# Patient Record
Sex: MALE | Race: WHITE | Employment: FULL TIME | URBAN - METROPOLITAN AREA
[De-identification: names, ages, dates, MRNs, and addresses within clinical notes are randomized per-mention and may not be internally consistent; named-entity substitution may affect disease eponyms.]

---

## 2017-01-20 ENCOUNTER — APPOINTMENT (OUTPATIENT)
Dept: LAB | Facility: CLINIC | Age: 62
End: 2017-01-20
Payer: COMMERCIAL

## 2017-01-20 ENCOUNTER — TRANSCRIBE ORDERS (OUTPATIENT)
Dept: LAB | Facility: CLINIC | Age: 62
End: 2017-01-20

## 2017-01-20 DIAGNOSIS — Z79.4 TYPE 2 DIABETES MELLITUS WITH COMPLICATION, WITH LONG-TERM CURRENT USE OF INSULIN (HCC): ICD-10-CM

## 2017-01-20 DIAGNOSIS — E11.8 TYPE 2 DIABETES MELLITUS WITH COMPLICATION, WITH LONG-TERM CURRENT USE OF INSULIN (HCC): ICD-10-CM

## 2017-01-20 DIAGNOSIS — Z79.4 TYPE 2 DIABETES MELLITUS WITH COMPLICATION, WITH LONG-TERM CURRENT USE OF INSULIN (HCC): Primary | ICD-10-CM

## 2017-01-20 DIAGNOSIS — E11.8 TYPE 2 DIABETES MELLITUS WITH COMPLICATION, WITH LONG-TERM CURRENT USE OF INSULIN (HCC): Primary | ICD-10-CM

## 2017-01-20 LAB
EST. AVERAGE GLUCOSE BLD GHB EST-MCNC: 275 MG/DL
HBA1C MFR BLD: 11.2 % (ref 4.2–6.3)

## 2017-01-20 PROCEDURE — 83036 HEMOGLOBIN GLYCOSYLATED A1C: CPT

## 2017-01-20 PROCEDURE — 36415 COLL VENOUS BLD VENIPUNCTURE: CPT

## 2018-01-12 NOTE — MISCELLANEOUS
Message  Return to work or school:   Wander Gutierrez is under my professional care   He was seen in my office on 6/29/16   He is able to return to work on  6/30/2016            Signatures   Electronically signed by : SAGAR Boo ; Jul 1 2016  8:10AM EST                       (Author)
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
## 2019-04-01 PROBLEM — E55.9 VITAMIN D DEFICIENCY: Status: ACTIVE | Noted: 2019-04-01

## 2019-04-02 ENCOUNTER — OFFICE VISIT (OUTPATIENT)
Dept: INTERNAL MEDICINE CLINIC | Facility: CLINIC | Age: 64
End: 2019-04-02
Payer: COMMERCIAL

## 2019-04-02 VITALS
HEIGHT: 68 IN | TEMPERATURE: 99.1 F | OXYGEN SATURATION: 98 % | SYSTOLIC BLOOD PRESSURE: 130 MMHG | HEART RATE: 94 BPM | DIASTOLIC BLOOD PRESSURE: 84 MMHG | BODY MASS INDEX: 28.64 KG/M2 | WEIGHT: 189 LBS

## 2019-04-02 DIAGNOSIS — Z79.4 TYPE 2 DIABETES MELLITUS WITH HYPERGLYCEMIA, WITH LONG-TERM CURRENT USE OF INSULIN (HCC): Primary | ICD-10-CM

## 2019-04-02 DIAGNOSIS — Z72.0 TOBACCO ABUSE: ICD-10-CM

## 2019-04-02 DIAGNOSIS — E55.9 VITAMIN D DEFICIENCY: ICD-10-CM

## 2019-04-02 DIAGNOSIS — Z12.11 COLON CANCER SCREENING: ICD-10-CM

## 2019-04-02 DIAGNOSIS — M62.81 MUSCLE WEAKNESS: ICD-10-CM

## 2019-04-02 DIAGNOSIS — N52.9 ERECTILE DYSFUNCTION, UNSPECIFIED ERECTILE DYSFUNCTION TYPE: ICD-10-CM

## 2019-04-02 DIAGNOSIS — K08.9 TEETH PROBLEM: ICD-10-CM

## 2019-04-02 DIAGNOSIS — E11.65 TYPE 2 DIABETES MELLITUS WITH HYPERGLYCEMIA, WITH LONG-TERM CURRENT USE OF INSULIN (HCC): Primary | ICD-10-CM

## 2019-04-02 DIAGNOSIS — E66.3 OVERWEIGHT (BMI 25.0-29.9): ICD-10-CM

## 2019-04-02 DIAGNOSIS — Z12.5 PROSTATE CANCER SCREENING: ICD-10-CM

## 2019-04-02 DIAGNOSIS — E78.2 MIXED HYPERLIPIDEMIA: ICD-10-CM

## 2019-04-02 PROBLEM — M15.9 PRIMARY OSTEOARTHRITIS INVOLVING MULTIPLE JOINTS: Status: ACTIVE | Noted: 2019-04-02

## 2019-04-02 PROBLEM — M15.0 PRIMARY OSTEOARTHRITIS INVOLVING MULTIPLE JOINTS: Status: ACTIVE | Noted: 2019-04-02

## 2019-04-02 PROCEDURE — 99204 OFFICE O/P NEW MOD 45 MIN: CPT | Performed by: FAMILY MEDICINE

## 2019-04-02 PROCEDURE — 90471 IMMUNIZATION ADMIN: CPT | Performed by: FAMILY MEDICINE

## 2019-04-02 PROCEDURE — 90670 PCV13 VACCINE IM: CPT | Performed by: FAMILY MEDICINE

## 2019-04-02 PROCEDURE — 3008F BODY MASS INDEX DOCD: CPT | Performed by: FAMILY MEDICINE

## 2019-04-02 RX ORDER — LISINOPRIL 2.5 MG/1
2.5 TABLET ORAL DAILY
Refills: 5 | COMMUNITY
Start: 2019-01-08 | End: 2019-04-22 | Stop reason: SDUPTHER

## 2019-04-02 RX ORDER — INSULIN DEGLUDEC INJECTION 100 U/ML
INJECTION, SOLUTION SUBCUTANEOUS
Refills: 5 | COMMUNITY
Start: 2019-02-05 | End: 2019-08-25 | Stop reason: SDUPTHER

## 2019-04-02 RX ORDER — PIOGLITAZONEHYDROCHLORIDE 15 MG/1
15 TABLET ORAL DAILY
Refills: 5 | COMMUNITY
Start: 2019-03-04 | End: 2019-04-02

## 2019-04-02 RX ORDER — PRAVASTATIN SODIUM 20 MG
20 TABLET ORAL DAILY
COMMUNITY
End: 2019-05-19 | Stop reason: SDUPTHER

## 2019-04-09 ENCOUNTER — TELEPHONE (OUTPATIENT)
Dept: INTERNAL MEDICINE CLINIC | Facility: CLINIC | Age: 64
End: 2019-04-09

## 2019-04-22 DIAGNOSIS — E11.65 TYPE 2 DIABETES MELLITUS WITH HYPERGLYCEMIA, UNSPECIFIED WHETHER LONG TERM INSULIN USE (HCC): Primary | ICD-10-CM

## 2019-04-22 RX ORDER — LISINOPRIL 2.5 MG/1
2.5 TABLET ORAL DAILY
Qty: 30 TABLET | Refills: 5 | Status: SHIPPED | OUTPATIENT
Start: 2019-04-22 | End: 2019-11-23 | Stop reason: SDUPTHER

## 2019-04-25 ENCOUNTER — APPOINTMENT (OUTPATIENT)
Dept: LAB | Facility: CLINIC | Age: 64
End: 2019-04-25
Payer: COMMERCIAL

## 2019-04-25 DIAGNOSIS — N52.9 ERECTILE DYSFUNCTION, UNSPECIFIED ERECTILE DYSFUNCTION TYPE: ICD-10-CM

## 2019-04-25 DIAGNOSIS — K08.9 TEETH PROBLEM: ICD-10-CM

## 2019-04-25 DIAGNOSIS — M62.81 MUSCLE WEAKNESS: ICD-10-CM

## 2019-04-25 DIAGNOSIS — E55.9 VITAMIN D DEFICIENCY: ICD-10-CM

## 2019-04-25 DIAGNOSIS — Z79.4 TYPE 2 DIABETES MELLITUS WITH HYPERGLYCEMIA, WITH LONG-TERM CURRENT USE OF INSULIN (HCC): ICD-10-CM

## 2019-04-25 DIAGNOSIS — Z72.0 TOBACCO ABUSE: ICD-10-CM

## 2019-04-25 DIAGNOSIS — Z12.5 PROSTATE CANCER SCREENING: ICD-10-CM

## 2019-04-25 DIAGNOSIS — E11.65 TYPE 2 DIABETES MELLITUS WITH HYPERGLYCEMIA, WITH LONG-TERM CURRENT USE OF INSULIN (HCC): ICD-10-CM

## 2019-04-25 DIAGNOSIS — E66.3 OVERWEIGHT (BMI 25.0-29.9): ICD-10-CM

## 2019-04-25 DIAGNOSIS — E78.2 MIXED HYPERLIPIDEMIA: ICD-10-CM

## 2019-04-25 LAB
25(OH)D3 SERPL-MCNC: 19 NG/ML (ref 30–100)
ALBUMIN SERPL BCP-MCNC: 3.5 G/DL (ref 3.5–5)
ALP SERPL-CCNC: 89 U/L (ref 46–116)
ALT SERPL W P-5'-P-CCNC: 25 U/L (ref 12–78)
ANION GAP SERPL CALCULATED.3IONS-SCNC: 9 MMOL/L (ref 4–13)
AST SERPL W P-5'-P-CCNC: 25 U/L (ref 5–45)
BASOPHILS # BLD AUTO: 0.1 THOUSANDS/ΜL (ref 0–0.1)
BASOPHILS NFR BLD AUTO: 1 % (ref 0–1)
BILIRUB SERPL-MCNC: 0.81 MG/DL (ref 0.2–1)
BUN SERPL-MCNC: 10 MG/DL (ref 5–25)
CALCIUM SERPL-MCNC: 8.6 MG/DL (ref 8.3–10.1)
CHLORIDE SERPL-SCNC: 102 MMOL/L (ref 100–108)
CHOLEST SERPL-MCNC: 136 MG/DL (ref 50–200)
CO2 SERPL-SCNC: 27 MMOL/L (ref 21–32)
CREAT SERPL-MCNC: 0.69 MG/DL (ref 0.6–1.3)
CREAT UR-MCNC: 80 MG/DL
EOSINOPHIL # BLD AUTO: 0.3 THOUSAND/ΜL (ref 0–0.61)
EOSINOPHIL NFR BLD AUTO: 4 % (ref 0–6)
ERYTHROCYTE [DISTWIDTH] IN BLOOD BY AUTOMATED COUNT: 12.4 % (ref 11.6–15.1)
EST. AVERAGE GLUCOSE BLD GHB EST-MCNC: 235 MG/DL
GFR SERPL CREATININE-BSD FRML MDRD: 102 ML/MIN/1.73SQ M
GLUCOSE P FAST SERPL-MCNC: 159 MG/DL (ref 65–99)
HBA1C MFR BLD: 9.8 % (ref 4.2–6.3)
HCT VFR BLD AUTO: 53.5 % (ref 36.5–49.3)
HDLC SERPL-MCNC: 34 MG/DL (ref 40–60)
HGB BLD-MCNC: 18.3 G/DL (ref 12–17)
IMM GRANULOCYTES # BLD AUTO: 0.02 THOUSAND/UL (ref 0–0.2)
IMM GRANULOCYTES NFR BLD AUTO: 0 % (ref 0–2)
LDLC SERPL CALC-MCNC: 70 MG/DL (ref 0–100)
LYMPHOCYTES # BLD AUTO: 3.06 THOUSANDS/ΜL (ref 0.6–4.47)
LYMPHOCYTES NFR BLD AUTO: 36 % (ref 14–44)
MCH RBC QN AUTO: 30.3 PG (ref 26.8–34.3)
MCHC RBC AUTO-ENTMCNC: 34.2 G/DL (ref 31.4–37.4)
MCV RBC AUTO: 89 FL (ref 82–98)
MICROALBUMIN UR-MCNC: 15.7 MG/L (ref 0–20)
MICROALBUMIN/CREAT 24H UR: 20 MG/G CREATININE (ref 0–30)
MONOCYTES # BLD AUTO: 0.99 THOUSAND/ΜL (ref 0.17–1.22)
MONOCYTES NFR BLD AUTO: 12 % (ref 4–12)
NEUTROPHILS # BLD AUTO: 4.08 THOUSANDS/ΜL (ref 1.85–7.62)
NEUTS SEG NFR BLD AUTO: 47 % (ref 43–75)
NONHDLC SERPL-MCNC: 102 MG/DL
NRBC BLD AUTO-RTO: 0 /100 WBCS
PLATELET # BLD AUTO: 253 THOUSANDS/UL (ref 149–390)
PMV BLD AUTO: 10.2 FL (ref 8.9–12.7)
POTASSIUM SERPL-SCNC: 4 MMOL/L (ref 3.5–5.3)
PROT SERPL-MCNC: 7.6 G/DL (ref 6.4–8.2)
PSA SERPL-MCNC: 0.5 NG/ML (ref 0–4)
RBC # BLD AUTO: 6.04 MILLION/UL (ref 3.88–5.62)
SODIUM SERPL-SCNC: 138 MMOL/L (ref 136–145)
TRIGL SERPL-MCNC: 160 MG/DL
TSH SERPL DL<=0.05 MIU/L-ACNC: 0.83 UIU/ML (ref 0.36–3.74)
WBC # BLD AUTO: 8.55 THOUSAND/UL (ref 4.31–10.16)

## 2019-04-25 PROCEDURE — G0103 PSA SCREENING: HCPCS

## 2019-04-25 PROCEDURE — 82306 VITAMIN D 25 HYDROXY: CPT

## 2019-04-25 PROCEDURE — 80053 COMPREHEN METABOLIC PANEL: CPT

## 2019-04-25 PROCEDURE — 82570 ASSAY OF URINE CREATININE: CPT | Performed by: FAMILY MEDICINE

## 2019-04-25 PROCEDURE — 84443 ASSAY THYROID STIM HORMONE: CPT

## 2019-04-25 PROCEDURE — 83036 HEMOGLOBIN GLYCOSYLATED A1C: CPT

## 2019-04-25 PROCEDURE — 84402 ASSAY OF FREE TESTOSTERONE: CPT

## 2019-04-25 PROCEDURE — 36415 COLL VENOUS BLD VENIPUNCTURE: CPT

## 2019-04-25 PROCEDURE — 84403 ASSAY OF TOTAL TESTOSTERONE: CPT

## 2019-04-25 PROCEDURE — 84681 ASSAY OF C-PEPTIDE: CPT

## 2019-04-25 PROCEDURE — 85025 COMPLETE CBC W/AUTO DIFF WBC: CPT

## 2019-04-25 PROCEDURE — 82043 UR ALBUMIN QUANTITATIVE: CPT | Performed by: FAMILY MEDICINE

## 2019-04-25 PROCEDURE — 80061 LIPID PANEL: CPT

## 2019-04-26 LAB
C PEPTIDE SERPL-MCNC: 1.1 NG/ML (ref 1.1–4.4)
TESTOST FREE SERPL-MCNC: 8.3 PG/ML (ref 6.6–18.1)
TESTOST SERPL-MCNC: 288 NG/DL (ref 264–916)

## 2019-05-08 RX ORDER — PIOGLITAZONEHYDROCHLORIDE 15 MG/1
TABLET ORAL
Qty: 30 TABLET | Refills: 5 | OUTPATIENT
Start: 2019-05-08

## 2019-05-13 ENCOUNTER — TELEPHONE (OUTPATIENT)
Dept: INTERNAL MEDICINE CLINIC | Facility: CLINIC | Age: 64
End: 2019-05-13

## 2019-05-13 DIAGNOSIS — E55.9 VITAMIN D DEFICIENCY: Primary | ICD-10-CM

## 2019-05-13 RX ORDER — ERGOCALCIFEROL 1.25 MG/1
50000 CAPSULE ORAL WEEKLY
Qty: 12 CAPSULE | Refills: 1 | Status: SHIPPED | OUTPATIENT
Start: 2019-05-13 | End: 2021-08-24 | Stop reason: SDUPTHER

## 2019-05-16 DIAGNOSIS — E11.65 TYPE 2 DIABETES MELLITUS WITH HYPERGLYCEMIA, WITH LONG-TERM CURRENT USE OF INSULIN (HCC): Primary | ICD-10-CM

## 2019-05-16 DIAGNOSIS — Z79.4 TYPE 2 DIABETES MELLITUS WITH HYPERGLYCEMIA, WITH LONG-TERM CURRENT USE OF INSULIN (HCC): Primary | ICD-10-CM

## 2019-05-19 DIAGNOSIS — E78.2 MIXED HYPERLIPIDEMIA: Primary | ICD-10-CM

## 2019-05-19 RX ORDER — PRAVASTATIN SODIUM 20 MG
TABLET ORAL
Qty: 30 TABLET | Refills: 4 | Status: SHIPPED | OUTPATIENT
Start: 2019-05-19 | End: 2019-11-23 | Stop reason: SDUPTHER

## 2019-07-08 DIAGNOSIS — R19.5 POSITIVE COLORECTAL CANCER SCREENING USING COLOGUARD TEST: Primary | ICD-10-CM

## 2019-07-25 ENCOUNTER — APPOINTMENT (OUTPATIENT)
Dept: LAB | Facility: CLINIC | Age: 64
End: 2019-07-25
Payer: COMMERCIAL

## 2019-07-25 ENCOUNTER — OFFICE VISIT (OUTPATIENT)
Dept: INTERNAL MEDICINE CLINIC | Facility: CLINIC | Age: 64
End: 2019-07-25
Payer: COMMERCIAL

## 2019-07-25 VITALS
TEMPERATURE: 97.3 F | HEART RATE: 97 BPM | BODY MASS INDEX: 27.13 KG/M2 | DIASTOLIC BLOOD PRESSURE: 70 MMHG | HEIGHT: 68 IN | WEIGHT: 179 LBS | OXYGEN SATURATION: 97 % | SYSTOLIC BLOOD PRESSURE: 118 MMHG

## 2019-07-25 DIAGNOSIS — R19.5 POSITIVE COLORECTAL CANCER SCREENING USING COLOGUARD TEST: ICD-10-CM

## 2019-07-25 DIAGNOSIS — E11.65 TYPE 2 DIABETES MELLITUS WITH HYPERGLYCEMIA, WITH LONG-TERM CURRENT USE OF INSULIN (HCC): Primary | ICD-10-CM

## 2019-07-25 DIAGNOSIS — Z79.4 TYPE 2 DIABETES MELLITUS WITH HYPERGLYCEMIA, WITH LONG-TERM CURRENT USE OF INSULIN (HCC): Primary | ICD-10-CM

## 2019-07-25 DIAGNOSIS — Z79.4 TYPE 2 DIABETES MELLITUS WITH HYPERGLYCEMIA, WITH LONG-TERM CURRENT USE OF INSULIN (HCC): ICD-10-CM

## 2019-07-25 DIAGNOSIS — E78.2 MIXED HYPERLIPIDEMIA: ICD-10-CM

## 2019-07-25 DIAGNOSIS — E55.9 VITAMIN D DEFICIENCY: ICD-10-CM

## 2019-07-25 DIAGNOSIS — M15.9 PRIMARY OSTEOARTHRITIS INVOLVING MULTIPLE JOINTS: ICD-10-CM

## 2019-07-25 DIAGNOSIS — E11.65 TYPE 2 DIABETES MELLITUS WITH HYPERGLYCEMIA, WITH LONG-TERM CURRENT USE OF INSULIN (HCC): ICD-10-CM

## 2019-07-25 LAB
ALBUMIN SERPL BCP-MCNC: 3.7 G/DL (ref 3.5–5)
ALP SERPL-CCNC: 92 U/L (ref 46–116)
ALT SERPL W P-5'-P-CCNC: 20 U/L (ref 12–78)
ANION GAP SERPL CALCULATED.3IONS-SCNC: 7 MMOL/L (ref 4–13)
AST SERPL W P-5'-P-CCNC: 12 U/L (ref 5–45)
BASOPHILS # BLD AUTO: 0.14 THOUSANDS/ΜL (ref 0–0.1)
BASOPHILS NFR BLD AUTO: 1 % (ref 0–1)
BILIRUB SERPL-MCNC: 0.74 MG/DL (ref 0.2–1)
BUN SERPL-MCNC: 12 MG/DL (ref 5–25)
CALCIUM SERPL-MCNC: 9.5 MG/DL (ref 8.3–10.1)
CHLORIDE SERPL-SCNC: 103 MMOL/L (ref 100–108)
CHOLEST SERPL-MCNC: 160 MG/DL (ref 50–200)
CO2 SERPL-SCNC: 26 MMOL/L (ref 21–32)
CREAT SERPL-MCNC: 0.79 MG/DL (ref 0.6–1.3)
EOSINOPHIL # BLD AUTO: 0.39 THOUSAND/ΜL (ref 0–0.61)
EOSINOPHIL NFR BLD AUTO: 2 % (ref 0–6)
ERYTHROCYTE [DISTWIDTH] IN BLOOD BY AUTOMATED COUNT: 12.2 % (ref 11.6–15.1)
EST. AVERAGE GLUCOSE BLD GHB EST-MCNC: 189 MG/DL
GFR SERPL CREATININE-BSD FRML MDRD: 97 ML/MIN/1.73SQ M
GLUCOSE P FAST SERPL-MCNC: 168 MG/DL (ref 65–99)
HBA1C MFR BLD: 8.2 % (ref 4.2–6.3)
HCT VFR BLD AUTO: 55.1 % (ref 36.5–49.3)
HDLC SERPL-MCNC: 37 MG/DL (ref 40–60)
HGB BLD-MCNC: 18.8 G/DL (ref 12–17)
IMM GRANULOCYTES # BLD AUTO: 0.1 THOUSAND/UL (ref 0–0.2)
IMM GRANULOCYTES NFR BLD AUTO: 0 % (ref 0–2)
LDLC SERPL CALC-MCNC: 81 MG/DL (ref 0–100)
LYMPHOCYTES # BLD AUTO: 3.32 THOUSANDS/ΜL (ref 0.6–4.47)
LYMPHOCYTES NFR BLD AUTO: 14 % (ref 14–44)
MCH RBC QN AUTO: 30.4 PG (ref 26.8–34.3)
MCHC RBC AUTO-ENTMCNC: 34.1 G/DL (ref 31.4–37.4)
MCV RBC AUTO: 89 FL (ref 82–98)
MONOCYTES # BLD AUTO: 1.21 THOUSAND/ΜL (ref 0.17–1.22)
MONOCYTES NFR BLD AUTO: 5 % (ref 4–12)
NEUTROPHILS # BLD AUTO: 17.87 THOUSANDS/ΜL (ref 1.85–7.62)
NEUTS SEG NFR BLD AUTO: 78 % (ref 43–75)
NONHDLC SERPL-MCNC: 123 MG/DL
NRBC BLD AUTO-RTO: 0 /100 WBCS
PLATELET # BLD AUTO: 273 THOUSANDS/UL (ref 149–390)
PMV BLD AUTO: 10.5 FL (ref 8.9–12.7)
POTASSIUM SERPL-SCNC: 4.5 MMOL/L (ref 3.5–5.3)
PROT SERPL-MCNC: 7.5 G/DL (ref 6.4–8.2)
RBC # BLD AUTO: 6.18 MILLION/UL (ref 3.88–5.62)
SODIUM SERPL-SCNC: 136 MMOL/L (ref 136–145)
TRIGL SERPL-MCNC: 210 MG/DL
TSH SERPL DL<=0.05 MIU/L-ACNC: 1.01 UIU/ML (ref 0.36–3.74)
WBC # BLD AUTO: 23.03 THOUSAND/UL (ref 4.31–10.16)

## 2019-07-25 PROCEDURE — 99214 OFFICE O/P EST MOD 30 MIN: CPT | Performed by: FAMILY MEDICINE

## 2019-07-25 PROCEDURE — 85025 COMPLETE CBC W/AUTO DIFF WBC: CPT

## 2019-07-25 PROCEDURE — 83036 HEMOGLOBIN GLYCOSYLATED A1C: CPT

## 2019-07-25 PROCEDURE — 80053 COMPREHEN METABOLIC PANEL: CPT

## 2019-07-25 PROCEDURE — 36415 COLL VENOUS BLD VENIPUNCTURE: CPT

## 2019-07-25 PROCEDURE — 82306 VITAMIN D 25 HYDROXY: CPT

## 2019-07-25 PROCEDURE — 80061 LIPID PANEL: CPT

## 2019-07-25 PROCEDURE — 3008F BODY MASS INDEX DOCD: CPT | Performed by: FAMILY MEDICINE

## 2019-07-25 PROCEDURE — 84443 ASSAY THYROID STIM HORMONE: CPT

## 2019-07-25 NOTE — PROGRESS NOTES
Assessment/Plan:    No problem-specific Assessment & Plan notes found for this encounter  Diagnoses and all orders for this visit:    Type 2 diabetes mellitus with hyperglycemia, with long-term current use of insulin (HCC)  -     CBC and differential; Future  -     Comprehensive metabolic panel; Future  -     Hemoglobin A1C; Future    Mixed hyperlipidemia  -     CBC and differential; Future  -     Comprehensive metabolic panel; Future  -     Lipid panel; Future  -     TSH, 3rd generation; Future    Primary osteoarthritis involving multiple joints  -     CBC and differential; Future    Vitamin D deficiency  -     CBC and differential; Future  -     Vitamin D 25 hydroxy; Future    Positive colorectal cancer screening using Cologuard test  -     CBC and differential; Future    Other orders  -     Cancel: Ambulatory referral to Podiatry; Future  -     Cancel: Pneumococcal polysaccharide vaccine 23-valent greater than or equal to 3yo subcutaneous/IM        Orders recommendations as noted above  He will have his laboratory testing done today  Blood sugars are better with the Invokana added  Will see where his hemoglobin A1c is  Discussed with him initially trying to get the hemoglobin A1c less than 8 but then eventually would like to strive for less than 7 especially given his young age  Discussed with him potentially increasing the dose of the Invokana depending on the results of the laboratory testing  Foot exam completed today  Discussed with him the importance of following up regularly with Ophthalmology  He will schedule this over the next few months  Blood pressure well controlled  Continue with the lisinopril not only for blood pressure control but also for renal protection in light of his diabetes  Continue with the pravastatin  Will adjust dose if needed depending on the results of today's laboratory testing  Continue with vitamin-D supplementation    Will recheck vitamin-D level in adjust if needed  Follow-up with GI for the colonoscopy especially since he has positive colo guard  He will be due for his next pneumonia shot next spring  Recommend flu shot in the fall  Will have him follow up in about 3-4 months or sooner if needed  Subjective:      Patient ID: Jer Davalos  is a 66 y o  male  He presents for routine follow-up  Has generally been doing well  Has been tolerating the Invokana without difficulty  Denies any hypoglycemic episodes with this combined with the metformin and Tresiba  Does notice that he urinates somewhat more frequently but he had expected this  Denies any dysuria  Drinking more fluids but he knew that this was expected with the medication  Tolerating lisinopril without difficulty  Denies any headaches or localized weakness  Denies any muscle aches or weakness  Denies any chest pain or palpitations  Remains active at work and at home  Energy level has been stable  He will be following up with GI for colonoscopy given the positive colo guard  Appetite has remained stable  Denies any nausea, vomiting, or  Denies any changes in bowel movements  Denies any blood or mucus in his bowel movements  Usually sleeps relatively well  Vision has been stable  He is overdue to follow up with Ophthalmology but he plans on setting this up over the next few months  Does not follow up with Podiatry  Denies any numbness or tingling into his feet  Has some joint aches especially in the ankles and occasionally into the knees  The following portions of the patient's history were reviewed and updated as appropriate:   He  has a past medical history of Diabetes mellitus ([address] ), Hyperlipidemia, Hypertension, Pancreatitis, and Pancreatitis    He   Patient Active Problem List    Diagnosis Date Noted    Positive colorectal cancer screening using Cologuard test 07/25/2019    Muscle weakness 04/02/2019    Erectile dysfunction 04/02/2019    Primary osteoarthritis involving multiple joints 04/02/2019    Teeth problem 04/02/2019    Vitamin D deficiency 04/01/2019    Hyponatremia 06/16/2016    Type 2 diabetes mellitus with hyperglycemia ([facility] [address] ) 06/14/2016    Hyperlipidemia 06/14/2016    Tobacco abuse 06/14/2016     He  has a past surgical history that includes Ankle surgery and Incision and drainage of wound (Right, 6/15/2016)  His family history includes Dementia in his father; Diabetes in his mother  He  reports that he has been smoking cigarettes  He has a 40 00 pack-year smoking history  He has never used smokeless tobacco  He reports that he does not drink alcohol or use drugs  Current Outpatient Medications   Medication Sig Dispense Refill    aspirin 81 MG tablet Take by mouth daily      canagliflozin (INVOKANA) 100 mg Take 1 tablet (100 mg total) by mouth daily before breakfast 90 tablet 1    ergocalciferol (VITAMIN D2) 50,000 units Take 1 capsule (50,000 Units total) by mouth once a week 12 capsule 1    lisinopril (ZESTRIL) 2 5 mg tablet Take 1 tablet (2 5 mg total) by mouth daily 30 tablet 5    metFORMIN (GLUCOPHAGE) 850 mg tablet Take 1 tablet (850 mg total) by mouth 3 (three) times a day 90 tablet 5    NOVOFINE 32G X 6 MM MISC ONCE DAILY OR AS DIRECTED 100 each 2    pravastatin (PRAVACHOL) 20 mg tablet ONE TABLET AT NIGHT 30 tablet 4    TRESIBA FLEXTOUCH 100 units/mL injection pen INJECT 24 UNITS SUBCUTANEOUSLY DAILY  5     No current facility-administered medications for this visit        Current Outpatient Medications on File Prior to Visit   Medication Sig    aspirin 81 MG tablet Take by mouth daily    canagliflozin (INVOKANA) 100 mg Take 1 tablet (100 mg total) by mouth daily before breakfast    ergocalciferol (VITAMIN D2) 50,000 units Take 1 capsule (50,000 Units total) by mouth once a week    lisinopril (ZESTRIL) 2 5 mg tablet Take 1 tablet (2 5 mg total) by mouth daily    metFORMIN (GLUCOPHAGE) 850 mg tablet Take 1 tablet (850 mg total) by mouth 3 (three) times a day    NOVOFINE 32G X 6 MM MISC ONCE DAILY OR AS DIRECTED    pravastatin (PRAVACHOL) 20 mg tablet ONE TABLET AT NIGHT    TRESIBA FLEXTOUCH 100 units/mL injection pen INJECT 24 UNITS SUBCUTANEOUSLY DAILY     No current facility-administered medications on file prior to visit  He is allergic to penicillins       Review of Systems   Constitutional: Negative for activity change, appetite change, chills and fever  HENT: Negative for hearing loss  Eyes: Negative for pain and visual disturbance  Respiratory: Negative for chest tightness and shortness of breath  Cardiovascular: Negative for chest pain and palpitations  Gastrointestinal: Negative for abdominal pain, blood in stool, diarrhea, nausea and vomiting  Endocrine: Positive for polyuria  Negative for polydipsia and polyphagia  Genitourinary: Positive for frequency  Negative for dysuria  Musculoskeletal: Positive for arthralgias  Negative for gait problem  Skin: Negative for color change  Neurological: Negative for dizziness and headaches  Hematological: Does not bruise/bleed easily  Psychiatric/Behavioral: Negative for behavioral problems  The patient is not nervous/anxious  Objective:      /70 (BP Location: Left arm, Patient Position: Sitting, Cuff Size: Standard)   Pulse 97   Temp (!) 97 3 °F (36 3 °C) (Temporal)   Ht 5' 8" (1 727 m)   Wt 81 2 kg (179 lb)   SpO2 97%   BMI 27 22 kg/m²          Physical Exam   Constitutional: He is oriented to person, place, and time  He is cooperative  No distress  HENT:   Head: Normocephalic and atraumatic  Nose: Nose normal    Eyes: Pupils are equal, round, and reactive to light  Conjunctivae are normal    Cardiovascular: Normal rate, regular rhythm and normal heart sounds  Exam reveals no gallop and no friction rub  No murmur heard  Pulmonary/Chest: He has no wheezes  He has no rales  He exhibits no tenderness     Abdominal: He exhibits no distension  There is no tenderness  There is no rebound and no guarding  Musculoskeletal:   Degenerative changes into the feet and ankles; slight degenerative changes bilateral knees   Lymphadenopathy:     He has no cervical adenopathy  Neurological: He is alert and oriented to person, place, and time  Skin: Skin is warm and dry  Psychiatric: He has a normal mood and affect  His behavior is normal    Nursing note and vitals reviewed

## 2019-07-25 NOTE — PATIENT INSTRUCTIONS
Foot Care for People with Diabetes   WHAT YOU NEED TO KNOW:   What do I need to know about foot care? · Foot care helps protect your feet and prevent foot ulcers or sores  Long-term high blood sugar levels can damage the blood vessels and nerves in your legs and feet  This damage makes it hard to feel pressure, pain, temperature, and touch  You may not be able to feel a cut or sore, or shoes that are too tight  Foot care is needed to prevent serious problems, such as an infection or amputation  · Diabetes may cause your toes to become crooked or curved under  These changes may affect the way you walk and can lead to increased pressure on your foot  The pressure can decrease blood flow to your feet  Lack of blood flow increases your risk for a foot ulcer  Do not ignore small problems, such as dry skin or small wounds  These can become life-threatening over time without proper care  How do I care for my feet? · Check your feet each day  Look at your whole foot, including the bottom, and between and under your toes  Check for wounds, corns, and calluses  Use a mirror to see the bottom of your feet  The skin on your feet may be shiny, tight, or darker than normal  Your feet may also be cold and pale  Feel your feet by running your hands along the tops, bottoms, sides, and between your toes  Redness, swelling, and warmth are signs of blood flow problems that can lead to a foot ulcer  Do not try to remove corns or calluses yourself  · Wash your feet each day with soap and warm water  Do not use hot water, because this can injure your foot  Dry your feet gently with a towel after you wash them  Dry between and under your toes  · Apply lotion or a moisturizer on your dry feet  Ask your healthcare provider what lotions are best to use  Do not put lotion or moisturizer between your toes  · Cut your toenails correctly  File or cut your toenails straight across   Use a soft brush to clean around your toenails  If your toenails are very thick, you may need to have a healthcare provider or specialist cut them  · Protect your feet  Do not walk barefoot or wear your shoes without socks  Check your shoes for rocks or other objects that can hurt your feet  Wear cotton socks to help keep your feet dry  Wear socks without toe seams, or wear them with the seams inside out  Change your socks each day  Do not wear socks that are dirty or damp  · Wear shoes that fit well  Wear shoes that do not rub against any area of your feet  Your shoes should be ½ to ¾ inch (1 to 2 centimeters) longer than your feet  Your shoes should also have extra space around the widest part of your feet  Walking or athletic shoes with laces or straps that adjust are best  Ask your healthcare provider for help to choose shoes that fit you best  Ask him if you need to wear an insert, orthotic, or bandage on your feet  · Go to your follow-up visits  Your healthcare provider will do a foot exam at least once a year  You may need a foot exam more often if you have nerve damage, foot deformities, or ulcers  He will check for nerve damage and how well you can feel your feet  He will check your shoes to see if they fit well  When should I contact my healthcare provider? · Your feet become numb, weak, or hard to move  · You have pus draining from a sore on your foot  · You have a wound on your foot that gets bigger, deeper, or does not heal      · You see blisters, cuts, scratches, calluses, or sores on your foot  · You have a fever, and your feet become red, warm, and swollen  · Your toenails become thick, curled, or yellow  · You find it hard to check your feet because your vision is poor  · You have questions or concerns about your condition or care  CARE AGREEMENT:   You have the right to help plan your care  Learn about your health condition and how it may be treated   Discuss treatment options with your caregivers to decide what care you want to receive  You always have the right to refuse treatment  The above information is an  only  It is not intended as medical advice for individual conditions or treatments  Talk to your doctor, nurse or pharmacist before following any medical regimen to see if it is safe and effective for you  © 2017 [employer] Information is for End User's use only and may not be sold, redistributed or otherwise used for commercial purposes  All illustrations and images included in CareNotes® are the copyrighted property of [employer]  or [employer]

## 2019-07-25 NOTE — PROGRESS NOTES
Diabetic Foot Exam    Patient's shoes and socks removed  Right Foot/Ankle   Right Foot Inspection  Skin Exam: skin normal and skin intact no dry skin, no warmth, no callus, no erythema, no maceration, no abnormal color, no pre-ulcer, no ulcer and no callus                          Toe Exam: ROM and strength within normal limits  Sensory       Monofilament testing: intact  Vascular  Capillary refills: < 3 seconds  The right DP pulse is 1+  The right PT pulse is 1+  Left Foot/Ankle  Left Foot Inspection  Skin Exam: skin normal and skin intactno dry skin, no warmth, no erythema, no maceration, normal color, no pre-ulcer, no ulcer and no callus                         Toe Exam: ROM and strength within normal limits                   Sensory       Monofilament: intact  Vascular  Capillary refills: < 3 seconds  The left DP pulse is 1+  The left PT pulse is 1+  Assign Risk Category:  No deformity present; No loss of protective sensation;  No weak pulses       Risk: 0

## 2019-07-26 LAB — 25(OH)D3 SERPL-MCNC: 50.8 NG/ML (ref 30–100)

## 2019-07-30 ENCOUNTER — CONSULT (OUTPATIENT)
Dept: GASTROENTEROLOGY | Facility: HOSPITAL | Age: 64
End: 2019-07-30
Payer: COMMERCIAL

## 2019-07-30 VITALS
HEART RATE: 103 BPM | TEMPERATURE: 98.6 F | BODY MASS INDEX: 27.16 KG/M2 | WEIGHT: 179.2 LBS | HEIGHT: 68 IN | DIASTOLIC BLOOD PRESSURE: 82 MMHG | SYSTOLIC BLOOD PRESSURE: 142 MMHG

## 2019-07-30 DIAGNOSIS — Z79.4 TYPE 2 DIABETES MELLITUS WITH HYPERGLYCEMIA, WITH LONG-TERM CURRENT USE OF INSULIN (HCC): ICD-10-CM

## 2019-07-30 DIAGNOSIS — E11.65 TYPE 2 DIABETES MELLITUS WITH HYPERGLYCEMIA, WITH LONG-TERM CURRENT USE OF INSULIN (HCC): ICD-10-CM

## 2019-07-30 DIAGNOSIS — R19.5 POSITIVE COLORECTAL CANCER SCREENING USING COLOGUARD TEST: Primary | ICD-10-CM

## 2019-07-30 PROCEDURE — 99244 OFF/OP CNSLTJ NEW/EST MOD 40: CPT | Performed by: INTERNAL MEDICINE

## 2019-07-30 NOTE — PROGRESS NOTES
[facility] - Outpatient Consultation  Celi Camejo  66 y o  male MRN: [id number]  Encounter: [id number]          ASSESSMENT AND PLAN:    Celi Camejo  is a 66 y o  male who presents with complaint of + Cologuard  Will plan for colonoscopy to evaluate for CRC, polyps, source of + Cologuard test  Prior labs reviewed  1  Positive colorectal cancer screening using Cologuard test      Orders Placed This Encounter   Procedures    Colonoscopy     -- Plan for colonoscopy    ______________________________________________________________________    HPI:    Celi Camejo  is a 66 y o  male who presents with complaint of + Cologard  No prior colonoscopies  No abdominal pain  He gets occasional diarrhea but it is usually after drinking coffee  Can be a little loose  It does not bother him  He has 2 BMs per day on average  No BRBPr or melena  No change in stool caliber  No nausea, vomiting, heartburn, odynophagia, dysphagia  Some weight loss that he relates to his medication  8 lbs in 3-4 months  No fevers, chills  No FH of colon cancer or GI problems (his mother had polyps once)       REVIEW OF SYSTEMS:  10 point ROS reviewed and negative, except as above      Historical Information   Past Medical History:   Diagnosis Date    Diabetes mellitus ([address] )     Hyperlipidemia     Hypertension     Pancreatitis     Pancreatitis      Past Surgical History:   Procedure Laterality Date    ANKLE SURGERY      INCISION AND DRAINAGE OF WOUND Right 6/15/2016    Procedure: INCISION AND DRAINAGE (I&D) EXTREMITY;  Surgeon: Rudolph Meadows MD;  Location: [facility];  Service:      Social History   Social History     Substance and Sexual Activity   Alcohol Use No     Social History     Substance and Sexual Activity   Drug Use No     Social History     Tobacco Use   Smoking Status Current Every Day Smoker    Packs/day: 2 00    Years: 20 00    Pack years: 40 00    Types: Cigarettes   Smokeless Tobacco Never Used     Family History   Problem Relation Age of Onset    Diabetes Mother     Dementia Father        Meds/Allergies       Current Outpatient Medications:     aspirin 81 MG tablet    canagliflozin (INVOKANA) 100 mg    ergocalciferol (VITAMIN D2) 50,000 units    lisinopril (ZESTRIL) 2 5 mg tablet    metFORMIN (GLUCOPHAGE) 850 mg tablet    pravastatin (PRAVACHOL) 20 mg tablet    TRESIBA FLEXTOUCH 100 units/mL injection pen    NOVOFINE 32G X 6 MM MISC    Allergies   Allergen Reactions    Penicillins            Objective     Blood pressure 142/82, pulse 103, temperature 98 6 °F (37 °C), temperature source Tympanic, height 5' 8" (1 727 m), weight 81 3 kg (179 lb 3 2 oz)  Body mass index is 27 25 kg/m²  PHYSICAL EXAM:      General Appearance:   Alert, cooperative, no distress   HEENT:   Normocephalic, atraumatic, anicteric  Neck:  Supple, symmetrical, trachea midline   Lungs:   Clear to auscultation bilaterally; no rales, rhonchi or wheezing; respirations unlabored    Heart[de-identified]   Regular rate and rhythm; no murmur, rub, or gallop  Abdomen:   Soft, non-tender, non-distended; normal bowel sounds; no masses, no organomegaly    Genitalia:   Deferred    Rectal:   Deferred    Extremities:  No cyanosis, clubbing or edema    Pulses:  2+ and symmetric    Skin:  No jaundice, rashes, or lesions    Lymph nodes:  No palpable cervical lymphadenopathy        Lab Results:   No visits with results within 1 Day(s) from this visit     Latest known visit with results is:   Appointment on 07/25/2019   Component Date Value    WBC 07/25/2019 23 03*    RBC 07/25/2019 6 18*    Hemoglobin 07/25/2019 18 8*    Hematocrit 07/25/2019 55 1*    MCV 07/25/2019 89     MCH 07/25/2019 30 4     MCHC 07/25/2019 34 1     RDW 07/25/2019 12 2     MPV 07/25/2019 10 5     Platelets 43/98/1035 273     nRBC 07/25/2019 0     Neutrophils Relative 07/25/2019 78*    Immat GRANS % 07/25/2019 0     Lymphocytes Relative 07/25/2019 14     Monocytes Relative 07/25/2019 5     Eosinophils Relative 07/25/2019 2     Basophils Relative 07/25/2019 1     Neutrophils Absolute 07/25/2019 17 87*    Immature Grans Absolute 07/25/2019 0 10     Lymphocytes Absolute 07/25/2019 3 32     Monocytes Absolute 07/25/2019 1 21     Eosinophils Absolute 07/25/2019 0 39     Basophils Absolute 07/25/2019 0 14*    Sodium 07/25/2019 136     Potassium 07/25/2019 4 5     Chloride 07/25/2019 103     CO2 07/25/2019 26     ANION GAP 07/25/2019 7     BUN 07/25/2019 12     Creatinine 07/25/2019 0 79     Glucose, Fasting 07/25/2019 168*    Calcium 07/25/2019 9 5     AST 07/25/2019 12     ALT 07/25/2019 20     Alkaline Phosphatase 07/25/2019 92     Total Protein 07/25/2019 7 5     Albumin 07/25/2019 3 7     Total Bilirubin 07/25/2019 0 74     eGFR 07/25/2019 97     Hemoglobin A1C 07/25/2019 8 2*    EAG 07/25/2019 189     Cholesterol 07/25/2019 160     Triglycerides 07/25/2019 210*    HDL, Direct 07/25/2019 37*    LDL Calculated 07/25/2019 81     Non-HDL-Chol (CHOL-HDL) 07/25/2019 123     TSH 3RD GENERATON 07/25/2019 1 010     Vit D, 25-Hydroxy 07/25/2019 50 8          Radiology Results:   No results found

## 2019-07-30 NOTE — H&P (VIEW-ONLY)
[facility] - Outpatient Consultation  Cat Trevino  66 y o  male MRN: [id number]  Encounter: [id number]          ASSESSMENT AND PLAN:    Cat Trevino  is a 66 y o  male who presents with complaint of + Cologuard  Will plan for colonoscopy to evaluate for CRC, polyps, source of + Cologuard test  Prior labs reviewed  1  Positive colorectal cancer screening using Cologuard test      Orders Placed This Encounter   Procedures    Colonoscopy     -- Plan for colonoscopy    ______________________________________________________________________    HPI:    Cat Trevino  is a 66 y o  male who presents with complaint of + Cologard  No prior colonoscopies  No abdominal pain  He gets occasional diarrhea but it is usually after drinking coffee  Can be a little loose  It does not bother him  He has 2 BMs per day on average  No BRBPr or melena  No change in stool caliber  No nausea, vomiting, heartburn, odynophagia, dysphagia  Some weight loss that he relates to his medication  8 lbs in 3-4 months  No fevers, chills  No FH of colon cancer or GI problems (his mother had polyps once)       REVIEW OF SYSTEMS:  10 point ROS reviewed and negative, except as above      Historical Information   Past Medical History:   Diagnosis Date    Diabetes mellitus ([address] )     Hyperlipidemia     Hypertension     Pancreatitis     Pancreatitis      Past Surgical History:   Procedure Laterality Date    ANKLE SURGERY      INCISION AND DRAINAGE OF WOUND Right 6/15/2016    Procedure: INCISION AND DRAINAGE (I&D) EXTREMITY;  Surgeon: Cristian Fall MD;  Location: [facility];  Service:      Social History   Social History     Substance and Sexual Activity   Alcohol Use No     Social History     Substance and Sexual Activity   Drug Use No     Social History     Tobacco Use   Smoking Status Current Every Day Smoker    Packs/day: 2 00    Years: 20 00    Pack years: 40 00    Types: Cigarettes   Smokeless Tobacco Never Used     Family History   Problem Relation Age of Onset    Diabetes Mother     Dementia Father        Meds/Allergies       Current Outpatient Medications:     aspirin 81 MG tablet    canagliflozin (INVOKANA) 100 mg    ergocalciferol (VITAMIN D2) 50,000 units    lisinopril (ZESTRIL) 2 5 mg tablet    metFORMIN (GLUCOPHAGE) 850 mg tablet    pravastatin (PRAVACHOL) 20 mg tablet    TRESIBA FLEXTOUCH 100 units/mL injection pen    NOVOFINE 32G X 6 MM MISC    Allergies   Allergen Reactions    Penicillins            Objective     Blood pressure 142/82, pulse 103, temperature 98 6 °F (37 °C), temperature source Tympanic, height 5' 8" (1 727 m), weight 81 3 kg (179 lb 3 2 oz)  Body mass index is 27 25 kg/m²  PHYSICAL EXAM:      General Appearance:   Alert, cooperative, no distress   HEENT:   Normocephalic, atraumatic, anicteric  Neck:  Supple, symmetrical, trachea midline   Lungs:   Clear to auscultation bilaterally; no rales, rhonchi or wheezing; respirations unlabored    Heart[de-identified]   Regular rate and rhythm; no murmur, rub, or gallop  Abdomen:   Soft, non-tender, non-distended; normal bowel sounds; no masses, no organomegaly    Genitalia:   Deferred    Rectal:   Deferred    Extremities:  No cyanosis, clubbing or edema    Pulses:  2+ and symmetric    Skin:  No jaundice, rashes, or lesions    Lymph nodes:  No palpable cervical lymphadenopathy        Lab Results:   No visits with results within 1 Day(s) from this visit     Latest known visit with results is:   Appointment on 07/25/2019   Component Date Value    WBC 07/25/2019 23 03*    RBC 07/25/2019 6 18*    Hemoglobin 07/25/2019 18 8*    Hematocrit 07/25/2019 55 1*    MCV 07/25/2019 89     MCH 07/25/2019 30 4     MCHC 07/25/2019 34 1     RDW 07/25/2019 12 2     MPV 07/25/2019 10 5     Platelets 21/22/6975 273     nRBC 07/25/2019 0     Neutrophils Relative 07/25/2019 78*    Immat GRANS % 07/25/2019 0     Lymphocytes Relative 07/25/2019 14     Monocytes Relative 07/25/2019 5     Eosinophils Relative 07/25/2019 2     Basophils Relative 07/25/2019 1     Neutrophils Absolute 07/25/2019 17 87*    Immature Grans Absolute 07/25/2019 0 10     Lymphocytes Absolute 07/25/2019 3 32     Monocytes Absolute 07/25/2019 1 21     Eosinophils Absolute 07/25/2019 0 39     Basophils Absolute 07/25/2019 0 14*    Sodium 07/25/2019 136     Potassium 07/25/2019 4 5     Chloride 07/25/2019 103     CO2 07/25/2019 26     ANION GAP 07/25/2019 7     BUN 07/25/2019 12     Creatinine 07/25/2019 0 79     Glucose, Fasting 07/25/2019 168*    Calcium 07/25/2019 9 5     AST 07/25/2019 12     ALT 07/25/2019 20     Alkaline Phosphatase 07/25/2019 92     Total Protein 07/25/2019 7 5     Albumin 07/25/2019 3 7     Total Bilirubin 07/25/2019 0 74     eGFR 07/25/2019 97     Hemoglobin A1C 07/25/2019 8 2*    EAG 07/25/2019 189     Cholesterol 07/25/2019 160     Triglycerides 07/25/2019 210*    HDL, Direct 07/25/2019 37*    LDL Calculated 07/25/2019 81     Non-HDL-Chol (CHOL-HDL) 07/25/2019 123     TSH 3RD GENERATON 07/25/2019 1 010     Vit D, 25-Hydroxy 07/25/2019 50 8          Radiology Results:   No results found

## 2019-07-30 NOTE — PATIENT INSTRUCTIONS
Schedule patient with Dr Mao Montiel on August 20, 2019, gave Miralax/Dulcolax instructions  Prn visit   Dr Mao Montiel told him to hold his diabetic meds the day of his procedure

## 2019-08-20 ENCOUNTER — ANESTHESIA EVENT (OUTPATIENT)
Dept: PERIOP | Facility: HOSPITAL | Age: 64
End: 2019-08-20

## 2019-08-20 ENCOUNTER — HOSPITAL ENCOUNTER (OUTPATIENT)
Dept: PERIOP | Facility: HOSPITAL | Age: 64
Setting detail: OUTPATIENT SURGERY
Discharge: HOME | End: 2019-08-20
Attending: INTERNAL MEDICINE | Admitting: INTERNAL MEDICINE
Payer: COMMERCIAL

## 2019-08-20 ENCOUNTER — ANESTHESIA (OUTPATIENT)
Dept: PERIOP | Facility: HOSPITAL | Age: 64
End: 2019-08-20

## 2019-08-20 VITALS
RESPIRATION RATE: 16 BRPM | TEMPERATURE: 97.6 F | HEART RATE: 67 BPM | DIASTOLIC BLOOD PRESSURE: 62 MMHG | SYSTOLIC BLOOD PRESSURE: 112 MMHG | OXYGEN SATURATION: 95 %

## 2019-08-20 DIAGNOSIS — R19.5 POSITIVE COLORECTAL CANCER SCREENING USING COLOGUARD TEST: ICD-10-CM

## 2019-08-20 LAB
GLUCOSE SERPL-MCNC: 140 MG/DL (ref 65–140)
GLUCOSE SERPL-MCNC: 165 MG/DL (ref 65–140)

## 2019-08-20 PROCEDURE — 88305 TISSUE EXAM BY PATHOLOGIST: CPT | Performed by: PATHOLOGY

## 2019-08-20 PROCEDURE — 82948 REAGENT STRIP/BLOOD GLUCOSE: CPT

## 2019-08-20 PROCEDURE — 45380 COLONOSCOPY AND BIOPSY: CPT | Performed by: INTERNAL MEDICINE

## 2019-08-20 PROCEDURE — 45385 COLONOSCOPY W/LESION REMOVAL: CPT | Performed by: INTERNAL MEDICINE

## 2019-08-20 RX ORDER — SODIUM CHLORIDE, SODIUM LACTATE, POTASSIUM CHLORIDE, CALCIUM CHLORIDE 600; 310; 30; 20 MG/100ML; MG/100ML; MG/100ML; MG/100ML
125 INJECTION, SOLUTION INTRAVENOUS CONTINUOUS
Status: DISCONTINUED | OUTPATIENT
Start: 2019-08-20 | End: 2019-08-20

## 2019-08-20 RX ORDER — PROPOFOL 10 MG/ML
INJECTION, EMULSION INTRAVENOUS AS NEEDED
Status: DISCONTINUED | OUTPATIENT
Start: 2019-08-20 | End: 2019-08-20 | Stop reason: SURG

## 2019-08-20 RX ORDER — LIDOCAINE HYDROCHLORIDE 10 MG/ML
INJECTION, SOLUTION INFILTRATION; PERINEURAL AS NEEDED
Status: DISCONTINUED | OUTPATIENT
Start: 2019-08-20 | End: 2019-08-20 | Stop reason: SURG

## 2019-08-20 RX ADMIN — PROPOFOL 20 MG: 10 INJECTION, EMULSION INTRAVENOUS at 08:14

## 2019-08-20 RX ADMIN — PROPOFOL 50 MG: 10 INJECTION, EMULSION INTRAVENOUS at 08:02

## 2019-08-20 RX ADMIN — PHENYLEPHRINE HYDROCHLORIDE 100 MCG: 10 INJECTION INTRAVENOUS at 08:13

## 2019-08-20 RX ADMIN — PROPOFOL 50 MG: 10 INJECTION, EMULSION INTRAVENOUS at 07:50

## 2019-08-20 RX ADMIN — PROPOFOL 30 MG: 10 INJECTION, EMULSION INTRAVENOUS at 08:07

## 2019-08-20 RX ADMIN — PROPOFOL 50 MG: 10 INJECTION, EMULSION INTRAVENOUS at 07:58

## 2019-08-20 RX ADMIN — SODIUM CHLORIDE, SODIUM LACTATE, POTASSIUM CHLORIDE, AND CALCIUM CHLORIDE 125 ML/HR: .6; .31; .03; .02 INJECTION, SOLUTION INTRAVENOUS at 06:53

## 2019-08-20 RX ADMIN — PROPOFOL 50 MG: 10 INJECTION, EMULSION INTRAVENOUS at 07:55

## 2019-08-20 RX ADMIN — PROPOFOL 100 MG: 10 INJECTION, EMULSION INTRAVENOUS at 07:48

## 2019-08-20 RX ADMIN — LIDOCAINE HYDROCHLORIDE 50 MG: 10 INJECTION, SOLUTION INFILTRATION; PERINEURAL at 07:48

## 2019-08-20 RX ADMIN — PROPOFOL 30 MG: 10 INJECTION, EMULSION INTRAVENOUS at 08:20

## 2019-08-20 NOTE — INTERVAL H&P NOTE
H&P reviewed  After examining the patient I find no changes in the patients condition since the H&P had been written      Vitals:    08/20/19 0646   BP: 109/63   Pulse: 76   Resp: 18   Temp: (!) 96 8 °F (36 °C)   SpO2: 95%

## 2019-08-20 NOTE — ANESTHESIA PREPROCEDURE EVALUATION
Review of Systems/Medical History  Patient summary reviewed  Chart reviewed      Cardiovascular  Exercise tolerance (METS): >4,  Hyperlipidemia, Hypertension controlled,    Pulmonary  Smoker cigarette smoker  , Tobacco cessation counseling given ,        GI/Hepatic    No GERD , Bowel prep       Negative  ROS        Endo/Other  Diabetes poorly controlled type 2 Insulin,      GYN       Hematology  Negative hematology ROS      Musculoskeletal    Arthritis     Neurology  Negative neurology ROS      Psychology   Negative psychology ROS              Physical Exam    Airway    Mallampati score: II  TM Distance: >3 FB  Neck ROM: full     Dental       Cardiovascular  Cardiovascular exam normal    Pulmonary  Pulmonary exam normal     Other Findings        Anesthesia Plan  ASA Score- 2     Anesthesia Type- IV sedation with anesthesia with ASA Monitors  Additional Monitors:   Airway Plan:         Plan Factors-  Patient did not smoke on day of surgery  Induction- intravenous  Postoperative Plan-     Informed Consent- Anesthetic plan and risks discussed with patient  I personally reviewed this patient with the CRNA  Discussed and agreed on the Anesthesia Plan with the CRNA  Zoila Alanis

## 2019-08-20 NOTE — DISCHARGE INSTRUCTIONS

## 2019-08-20 NOTE — ANESTHESIA POSTPROCEDURE EVALUATION
Post-Op Assessment Note    CV Status:  Stable       Mental Status:  Arousable   Hydration Status:  Stable   PONV Controlled:  None   Airway Patency:  Patent   Post Op Vitals Reviewed: Yes      Staff: CRNA           BP   109/54   Temp     Pulse  73   Resp   18   SpO2   98%

## 2019-08-25 DIAGNOSIS — E11.65 TYPE 2 DIABETES MELLITUS WITH HYPERGLYCEMIA, WITH LONG-TERM CURRENT USE OF INSULIN (HCC): Primary | ICD-10-CM

## 2019-08-25 DIAGNOSIS — Z79.4 TYPE 2 DIABETES MELLITUS WITH HYPERGLYCEMIA, WITH LONG-TERM CURRENT USE OF INSULIN (HCC): Primary | ICD-10-CM

## 2019-08-26 RX ORDER — INSULIN DEGLUDEC INJECTION 100 U/ML
INJECTION, SOLUTION SUBCUTANEOUS
Qty: 15 PEN | Refills: 5 | Status: SHIPPED | OUTPATIENT
Start: 2019-08-26 | End: 2021-12-06

## 2019-08-27 DIAGNOSIS — E11.65 TYPE 2 DIABETES MELLITUS WITH HYPERGLYCEMIA, WITH LONG-TERM CURRENT USE OF INSULIN (HCC): ICD-10-CM

## 2019-08-27 DIAGNOSIS — Z79.4 TYPE 2 DIABETES MELLITUS WITH HYPERGLYCEMIA, WITH LONG-TERM CURRENT USE OF INSULIN (HCC): ICD-10-CM

## 2019-08-28 RX ORDER — CANAGLIFLOZIN 100 MG/1
TABLET, FILM COATED ORAL
Qty: 30 TABLET | Refills: 5 | Status: SHIPPED | OUTPATIENT
Start: 2019-08-28 | End: 2020-03-14

## 2019-11-23 DIAGNOSIS — E11.65 TYPE 2 DIABETES MELLITUS WITH HYPERGLYCEMIA, UNSPECIFIED WHETHER LONG TERM INSULIN USE (HCC): ICD-10-CM

## 2019-11-23 DIAGNOSIS — E78.2 MIXED HYPERLIPIDEMIA: ICD-10-CM

## 2019-11-25 PROCEDURE — 4010F ACE/ARB THERAPY RXD/TAKEN: CPT | Performed by: FAMILY MEDICINE

## 2019-11-25 RX ORDER — PRAVASTATIN SODIUM 20 MG
TABLET ORAL
Qty: 30 TABLET | Refills: 4 | Status: SHIPPED | OUTPATIENT
Start: 2019-11-25 | End: 2020-04-14

## 2019-11-25 RX ORDER — LISINOPRIL 2.5 MG/1
TABLET ORAL
Qty: 30 TABLET | Refills: 5 | Status: SHIPPED | OUTPATIENT
Start: 2019-11-25 | End: 2020-05-13

## 2019-12-10 ENCOUNTER — OFFICE VISIT (OUTPATIENT)
Dept: INTERNAL MEDICINE CLINIC | Facility: CLINIC | Age: 64
End: 2019-12-10
Payer: COMMERCIAL

## 2019-12-10 ENCOUNTER — APPOINTMENT (OUTPATIENT)
Dept: LAB | Facility: CLINIC | Age: 64
End: 2019-12-10
Payer: COMMERCIAL

## 2019-12-10 VITALS
OXYGEN SATURATION: 96 % | DIASTOLIC BLOOD PRESSURE: 74 MMHG | SYSTOLIC BLOOD PRESSURE: 126 MMHG | TEMPERATURE: 97.8 F | BODY MASS INDEX: 26.05 KG/M2 | HEIGHT: 68 IN | WEIGHT: 171.9 LBS | HEART RATE: 102 BPM

## 2019-12-10 DIAGNOSIS — E11.65 TYPE 2 DIABETES MELLITUS WITH HYPERGLYCEMIA, WITH LONG-TERM CURRENT USE OF INSULIN (HCC): Primary | ICD-10-CM

## 2019-12-10 DIAGNOSIS — E55.9 VITAMIN D DEFICIENCY: ICD-10-CM

## 2019-12-10 DIAGNOSIS — E78.2 MIXED HYPERLIPIDEMIA: ICD-10-CM

## 2019-12-10 DIAGNOSIS — Z11.4 SCREENING FOR HIV (HUMAN IMMUNODEFICIENCY VIRUS): ICD-10-CM

## 2019-12-10 DIAGNOSIS — Z23 NEED FOR INFLUENZA VACCINATION: ICD-10-CM

## 2019-12-10 DIAGNOSIS — Z79.4 TYPE 2 DIABETES MELLITUS WITH HYPERGLYCEMIA, WITH LONG-TERM CURRENT USE OF INSULIN (HCC): ICD-10-CM

## 2019-12-10 DIAGNOSIS — J32.9 CHRONIC SINUSITIS, UNSPECIFIED LOCATION: ICD-10-CM

## 2019-12-10 DIAGNOSIS — E11.65 TYPE 2 DIABETES MELLITUS WITH HYPERGLYCEMIA, WITH LONG-TERM CURRENT USE OF INSULIN (HCC): ICD-10-CM

## 2019-12-10 DIAGNOSIS — D72.829 LEUKOCYTOSIS, UNSPECIFIED TYPE: ICD-10-CM

## 2019-12-10 DIAGNOSIS — Z79.4 TYPE 2 DIABETES MELLITUS WITH HYPERGLYCEMIA, WITH LONG-TERM CURRENT USE OF INSULIN (HCC): Primary | ICD-10-CM

## 2019-12-10 LAB
25(OH)D3 SERPL-MCNC: 28.4 NG/ML (ref 30–100)
ALBUMIN SERPL BCP-MCNC: 3.6 G/DL (ref 3.5–5)
ALP SERPL-CCNC: 93 U/L (ref 46–116)
ALT SERPL W P-5'-P-CCNC: 22 U/L (ref 12–78)
ANION GAP SERPL CALCULATED.3IONS-SCNC: 6 MMOL/L (ref 4–13)
AST SERPL W P-5'-P-CCNC: 10 U/L (ref 5–45)
BASOPHILS # BLD AUTO: 0.2 THOUSANDS/ΜL (ref 0–0.1)
BASOPHILS NFR BLD AUTO: 1 % (ref 0–1)
BILIRUB SERPL-MCNC: 1.29 MG/DL (ref 0.2–1)
BUN SERPL-MCNC: 10 MG/DL (ref 5–25)
CALCIUM SERPL-MCNC: 9.6 MG/DL (ref 8.3–10.1)
CHLORIDE SERPL-SCNC: 106 MMOL/L (ref 100–108)
CHOLEST SERPL-MCNC: 180 MG/DL (ref 50–200)
CO2 SERPL-SCNC: 27 MMOL/L (ref 21–32)
CREAT SERPL-MCNC: 0.74 MG/DL (ref 0.6–1.3)
EOSINOPHIL # BLD AUTO: 0.6 THOUSAND/ΜL (ref 0–0.61)
EOSINOPHIL NFR BLD AUTO: 3 % (ref 0–6)
ERYTHROCYTE [DISTWIDTH] IN BLOOD BY AUTOMATED COUNT: 12.3 % (ref 11.6–15.1)
EST. AVERAGE GLUCOSE BLD GHB EST-MCNC: 194 MG/DL
GFR SERPL CREATININE-BSD FRML MDRD: 99 ML/MIN/1.73SQ M
GLUCOSE P FAST SERPL-MCNC: 177 MG/DL (ref 65–99)
HBA1C MFR BLD: 8.4 % (ref 4.2–6.3)
HCT VFR BLD AUTO: 55.4 % (ref 36.5–49.3)
HDLC SERPL-MCNC: 42 MG/DL
HGB BLD-MCNC: 18.8 G/DL (ref 12–17)
IMM GRANULOCYTES # BLD AUTO: 0.1 THOUSAND/UL (ref 0–0.2)
IMM GRANULOCYTES NFR BLD AUTO: 1 % (ref 0–2)
LDLC SERPL CALC-MCNC: 109 MG/DL (ref 0–100)
LYMPHOCYTES # BLD AUTO: 3.03 THOUSANDS/ΜL (ref 0.6–4.47)
LYMPHOCYTES NFR BLD AUTO: 17 % (ref 14–44)
MCH RBC QN AUTO: 30.1 PG (ref 26.8–34.3)
MCHC RBC AUTO-ENTMCNC: 33.9 G/DL (ref 31.4–37.4)
MCV RBC AUTO: 89 FL (ref 82–98)
MONOCYTES # BLD AUTO: 1.21 THOUSAND/ΜL (ref 0.17–1.22)
MONOCYTES NFR BLD AUTO: 7 % (ref 4–12)
NEUTROPHILS # BLD AUTO: 13.12 THOUSANDS/ΜL (ref 1.85–7.62)
NEUTS SEG NFR BLD AUTO: 71 % (ref 43–75)
NONHDLC SERPL-MCNC: 138 MG/DL
NRBC BLD AUTO-RTO: 0 /100 WBCS
PLATELET # BLD AUTO: 277 THOUSANDS/UL (ref 149–390)
PMV BLD AUTO: 10 FL (ref 8.9–12.7)
POTASSIUM SERPL-SCNC: 4.3 MMOL/L (ref 3.5–5.3)
PROT SERPL-MCNC: 7.8 G/DL (ref 6.4–8.2)
RBC # BLD AUTO: 6.25 MILLION/UL (ref 3.88–5.62)
SODIUM SERPL-SCNC: 139 MMOL/L (ref 136–145)
TRIGL SERPL-MCNC: 144 MG/DL
TSH SERPL DL<=0.05 MIU/L-ACNC: 0.39 UIU/ML (ref 0.36–3.74)
WBC # BLD AUTO: 18.26 THOUSAND/UL (ref 4.31–10.16)

## 2019-12-10 PROCEDURE — 90682 RIV4 VACC RECOMBINANT DNA IM: CPT | Performed by: FAMILY MEDICINE

## 2019-12-10 PROCEDURE — 87389 HIV-1 AG W/HIV-1&-2 AB AG IA: CPT

## 2019-12-10 PROCEDURE — 84443 ASSAY THYROID STIM HORMONE: CPT

## 2019-12-10 PROCEDURE — 80061 LIPID PANEL: CPT

## 2019-12-10 PROCEDURE — 4004F PT TOBACCO SCREEN RCVD TLK: CPT | Performed by: FAMILY MEDICINE

## 2019-12-10 PROCEDURE — 82306 VITAMIN D 25 HYDROXY: CPT

## 2019-12-10 PROCEDURE — 85025 COMPLETE CBC W/AUTO DIFF WBC: CPT

## 2019-12-10 PROCEDURE — 36415 COLL VENOUS BLD VENIPUNCTURE: CPT

## 2019-12-10 PROCEDURE — 83036 HEMOGLOBIN GLYCOSYLATED A1C: CPT

## 2019-12-10 PROCEDURE — 99214 OFFICE O/P EST MOD 30 MIN: CPT | Performed by: FAMILY MEDICINE

## 2019-12-10 PROCEDURE — 3008F BODY MASS INDEX DOCD: CPT | Performed by: FAMILY MEDICINE

## 2019-12-10 PROCEDURE — 90471 IMMUNIZATION ADMIN: CPT | Performed by: FAMILY MEDICINE

## 2019-12-10 PROCEDURE — 80053 COMPREHEN METABOLIC PANEL: CPT

## 2019-12-10 NOTE — PROGRESS NOTES
Diabetic Foot Exam    Patient's shoes and socks removed  Right Foot/Ankle   Right Foot Inspection  Skin Exam: skin normal and skin intact no dry skin, no warmth, no callus, no erythema, no maceration, no abnormal color, no pre-ulcer, no ulcer and no callus                          Toe Exam: ROM and strength within normal limits  Sensory       Monofilament testing: diminished  Vascular  Capillary refills: < 3 seconds  The right DP pulse is 1+  The right PT pulse is 0  Left Foot/Ankle  Left Foot Inspection  Skin Exam: skin normal and skin intactno dry skin, no warmth, no erythema, no maceration, normal color, no pre-ulcer, no ulcer and no callus                         Toe Exam: ROM and strength within normal limits                   Sensory       Monofilament: diminished  Vascular  Capillary refills: < 3 seconds  The left DP pulse is 1+  The left PT pulse is 0  Assign Risk Category:  No deformity present;  Loss of protective sensation; Weak pulses       Risk: 2

## 2019-12-10 NOTE — ASSESSMENT & PLAN NOTE
He is going to have repeat laboratory testing done today  Continue with the pravastatin  Watch diet  Increase fiber in diet

## 2019-12-10 NOTE — PROGRESS NOTES
Assessment/Plan:    Type 2 diabetes mellitus with hyperglycemia ([facility] [address] )  Discussed with him a goal of a hemoglobin A1c less than 8 and as close to 7 as possible especially since he is relatively young  Discussed risks of having a more elevated hemoglobin A1c  Continue with the metformin  Discussed with him potentially increasing the Invokana in the future or possibly increasing that received but  This depends on the laboratory testing from today  Watch diet more closely  Our office will contact him with the results of the laboratory testing from today  Watch for any hypoglycemic episodes  Discussed with the importance of following up with Ophthalmology on a regular basis  Foot exam completed today  Recommend following up with Podiatry  Chronic sinusitis  Chronic sinus symptoms  This may be contributing to some of his elevated white blood cell count but this likely needs to be investigated further  Discussed using Mucinex to help loosen mucus  Consider trying antihistamines  Try nasal irrigation  Consider following up with ENT  Hyperlipidemia  He is going to have repeat laboratory testing done today  Continue with the pravastatin  Watch diet  Increase fiber in diet  Leukocytosis  White blood cell count was elevated with the last laboratory testing  This seems to have been sent to GI  Discussed with him that this may be related to his chronic sinus symptoms but is quite elevated  He also has elevation of his hemoglobin which may be related to the smoking  If this remains elevated will likely refer to Hematology for further investigation  Vitamin D deficiency  Continue with vitamin-D supplementation  Will continue follow vitamin-D level about 2 to 3 times a year  Diagnoses and all orders for this visit:    Type 2 diabetes mellitus with hyperglycemia, with long-term current use of insulin (HCC)  -     CBC and differential; Future  -     Comprehensive metabolic panel;  Future  - Hemoglobin A1C; Future  -     Lipid panel; Future  -     TSH, 3rd generation; Future    Mixed hyperlipidemia  -     CBC and differential; Future  -     Comprehensive metabolic panel; Future  -     Hemoglobin A1C; Future  -     Lipid panel; Future  -     TSH, 3rd generation; Future    Vitamin D deficiency  -     CBC and differential; Future  -     Comprehensive metabolic panel; Future  -     Hemoglobin A1C; Future  -     Lipid panel; Future  -     TSH, 3rd generation; Future  -     Vitamin D 25 hydroxy; Future    Chronic sinusitis, unspecified location  -     CBC and differential; Future  -     Comprehensive metabolic panel; Future  -     Hemoglobin A1C; Future  -     Lipid panel; Future  -     TSH, 3rd generation; Future    Leukocytosis, unspecified type    Need for influenza vaccination  -     influenza vaccine, [id number], quadrivalent, recombinant, PF, 0 5 mL, for patients 18 yr+ (FLUBLOK)    Screening for HIV (human immunodeficiency virus)  -     CBC and differential; Future  -     Comprehensive metabolic panel; Future  -     Hemoglobin A1C; Future  -     Lipid panel; Future  -     TSH, 3rd generation; Future  -     Human Immunodeficiency Virus 1/2 Antigen / Antibody ( Fourth Generation) with Reflex Testing; Future    Other orders  -     Cancel: Ambulatory referral to Ophthalmology; Future        Orders recommendations as noted above  He initially declined the flu shot but then did agree to it  He will be due for his next pneumonia shot next year  He is up-to-date on his colonoscopy  Up-to-date on his PSA  Discussed with him following up with Ophthalmology  Watch for worsening sinus symptoms  Will have him follow up in about 3-5 months or sooner if needed depending on the results of his laboratory testing  Subjective:      Patient ID: Trung Pugh  is a 60 y o  male  He presents for routine follow-up  Has generally been doing relatively well    He has been under a lot of stress with them caring for his elderly father who has dementia  He also has son who has an issue with drug addiction  He does get to see his granddaughter frequently which does make him and his wife quite happy  They are fearful of losing this son since they have already lost 1 son  Has been tolerating the metformin and the Invokana without difficulty  Continues with the [place] 24 units daily and has been doing well with this  Denies any hypoglycemic episodes  Does not follows blood sugars regularly  Has not followed up with Ophthalmology yet  He does plan on doing this  Tries to check his feet daily  Does has some occasional numbness and tingling into his feet but not severe  Does not always sleep well but he feels this is because of the stressors  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  He did follow-up with GI and white blood cell count was elevated  They advised him to discuss this with her office to have a recheck  Has chronic sinus symptoms which he feels may be contributing to some of his elevated white blood cell count  Denies any fevers or chills  Denies any nausea or vomiting  Bowel movements have been normal   He is up-to-date on his colonoscopy but will be due next year  Tolerating lisinopril without difficulty  Denies any headaches or localized weakness  Denies any chest pain or palpitations  Tolerating the pravastatin well  Denies any significant muscle aches or weakness  Has not been watching his diet as closely as he should  Continues to smoke and does not feel ready to stop at this point  Continues with the vitamin-D supplementation  The following portions of the patient's history were reviewed and updated as appropriate:   He  has a past medical history of Diabetes mellitus ([address] ), Hyperlipidemia, Hypertension, Pancreatitis, and Pancreatitis    He   Patient Active Problem List    Diagnosis Date Noted    Chronic sinusitis 12/10/2019    Leukocytosis 12/10/2019    Positive colorectal cancer screening using Cologuard test 07/25/2019    Muscle weakness 04/02/2019    Erectile dysfunction 04/02/2019    Primary osteoarthritis involving multiple joints 04/02/2019    Teeth problem 04/02/2019    Vitamin D deficiency 04/01/2019    Hyponatremia 06/16/2016    Type 2 diabetes mellitus with hyperglycemia ([facility] [address] ) 06/14/2016    Hyperlipidemia 06/14/2016    Tobacco abuse 06/14/2016     He  has a past surgical history that includes Ankle surgery and Incision and drainage of wound (Right, 6/15/2016)  His family history includes Dementia in his father; Diabetes in his mother  He  reports that he has been smoking cigarettes  He has a 40 00 pack-year smoking history  He has never used smokeless tobacco  He reports that he does not drink alcohol or use drugs  Current Outpatient Medications   Medication Sig Dispense Refill    ergocalciferol (VITAMIN D2) 50,000 units Take 1 capsule (50,000 Units total) by mouth once a week 12 capsule 1    INVOKANA 100 MG TAKE 1 TABLET (100 MG TOTAL) BY MOUTH DAILY BEFORE BREAKFAST 30 tablet 5    lisinopril (ZESTRIL) 2 5 mg tablet TAKE 1 TABLET BY MOUTH EVERY DAY 30 tablet 5    metFORMIN (GLUCOPHAGE) 850 mg tablet TAKE 1 TABLET (850 MG TOTAL) BY MOUTH 3 (THREE) TIMES A DAY 90 tablet 5    NOVOFINE 32G X 6 MM MISC ONCE DAILY OR AS DIRECTED 100 each 4    pravastatin (PRAVACHOL) 20 mg tablet ONE TABLET AT NIGHT 30 tablet 4    TRESIBA FLEXTOUCH 100 units/mL injection pen INJECT 24 UNITS SUBCUTANEOUSLY DAILY 15 pen 5     No current facility-administered medications for this visit        Current Outpatient Medications on File Prior to Visit   Medication Sig    ergocalciferol (VITAMIN D2) 50,000 units Take 1 capsule (50,000 Units total) by mouth once a week    INVOKANA 100 MG TAKE 1 TABLET (100 MG TOTAL) BY MOUTH DAILY BEFORE BREAKFAST    lisinopril (ZESTRIL) 2 5 mg tablet TAKE 1 TABLET BY MOUTH EVERY DAY    metFORMIN (GLUCOPHAGE) 850 mg tablet TAKE 1 TABLET (850 MG TOTAL) BY MOUTH 3 (THREE) TIMES A DAY    NOVOFINE 32G X 6 MM MISC ONCE DAILY OR AS DIRECTED    pravastatin (PRAVACHOL) 20 mg tablet ONE TABLET AT NIGHT    TRESIBA FLEXTOUCH 100 units/mL injection pen INJECT 24 UNITS SUBCUTANEOUSLY DAILY     No current facility-administered medications on file prior to visit  He is allergic to penicillins       Review of Systems   Constitutional: Positive for fatigue  Negative for activity change, appetite change, chills and fever  HENT: Positive for congestion, postnasal drip, sinus pressure and sinus pain  Negative for hearing loss, sore throat and trouble swallowing  Eyes: Negative for pain and visual disturbance  Respiratory: Negative for chest tightness and shortness of breath  Cardiovascular: Negative for chest pain and palpitations  Gastrointestinal: Negative for abdominal pain, blood in stool, diarrhea, nausea and vomiting  Endocrine: Negative for polydipsia, polyphagia and polyuria  Genitourinary: Negative for dysuria  Musculoskeletal: Positive for arthralgias  Negative for gait problem  Skin: Negative for color change  Neurological: Negative for dizziness and headaches  Hematological: Does not bruise/bleed easily  Psychiatric/Behavioral: Positive for sleep disturbance  Negative for behavioral problems  The patient is nervous/anxious  Objective:      /74 (BP Location: Left arm, Patient Position: Sitting, Cuff Size: Large)   Pulse 102   Temp 97 8 °F (36 6 °C) (Temporal)   Ht 5' 8" (1 727 m)   Wt 78 kg (171 lb 14 4 oz)   SpO2 96%   BMI 26 14 kg/m²          Physical Exam   Constitutional: He is oriented to person, place, and time  He is cooperative  No distress  HENT:   Head: Normocephalic and atraumatic  Nose: Nose normal    Boggy nasal mucosa with clear nasal discharge; the slight posterior pharyngeal erythema   Eyes: Pupils are equal, round, and reactive to light   Conjunctivae are normal    Neck: No JVD present  Cardiovascular: Normal rate, regular rhythm and normal heart sounds  Exam reveals no gallop and no friction rub  No murmur heard  Pulmonary/Chest: He has decreased breath sounds  He has no wheezes  He has no rhonchi  He has no rales  He exhibits no tenderness  Abdominal: He exhibits no distension  There is no tenderness  There is no rebound and no guarding  Lymphadenopathy:        Head (right side): No submental and no submandibular adenopathy present  Head (left side): No submental and no submandibular adenopathy present  He has no cervical adenopathy  Neurological: He is alert and oriented to person, place, and time  Skin: Skin is warm and dry  Psychiatric: He has a normal mood and affect  His behavior is normal    Nursing note and vitals reviewed  Tobacco Cessation Counseling: Tobacco cessation counseling was provided   The patient is sincerely urged to quit consumption of tobacco  He is not ready to quit tobacco

## 2019-12-10 NOTE — ASSESSMENT & PLAN NOTE
Chronic sinus symptoms  This may be contributing to some of his elevated white blood cell count but this likely needs to be investigated further  Discussed using Mucinex to help loosen mucus  Consider trying antihistamines  Try nasal irrigation  Consider following up with ENT

## 2019-12-10 NOTE — ASSESSMENT & PLAN NOTE
Discussed with him a goal of a hemoglobin A1c less than 8 and as close to 7 as possible especially since he is relatively young  Discussed risks of having a more elevated hemoglobin A1c  Continue with the metformin  Discussed with him potentially increasing the Invokana in the future or possibly increasing that received but  This depends on the laboratory testing from today  Watch diet more closely  Our office will contact him with the results of the laboratory testing from today  Watch for any hypoglycemic episodes  Discussed with the importance of following up with Ophthalmology on a regular basis  Foot exam completed today  Recommend following up with Podiatry

## 2019-12-11 LAB — HIV 1+2 AB+HIV1 P24 AG SERPL QL IA: NORMAL

## 2020-03-14 DIAGNOSIS — E11.65 TYPE 2 DIABETES MELLITUS WITH HYPERGLYCEMIA, WITH LONG-TERM CURRENT USE OF INSULIN (HCC): ICD-10-CM

## 2020-03-14 DIAGNOSIS — Z79.4 TYPE 2 DIABETES MELLITUS WITH HYPERGLYCEMIA, WITH LONG-TERM CURRENT USE OF INSULIN (HCC): ICD-10-CM

## 2020-03-14 RX ORDER — CANAGLIFLOZIN 100 MG/1
TABLET, FILM COATED ORAL
Qty: 30 TABLET | Refills: 5 | Status: SHIPPED | OUTPATIENT
Start: 2020-03-14 | End: 2021-08-24 | Stop reason: DRUGHIGH

## 2020-04-14 DIAGNOSIS — E78.2 MIXED HYPERLIPIDEMIA: ICD-10-CM

## 2020-04-14 RX ORDER — PRAVASTATIN SODIUM 20 MG
TABLET ORAL
Qty: 30 TABLET | Refills: 4 | Status: SHIPPED | OUTPATIENT
Start: 2020-04-14 | End: 2020-10-15

## 2020-04-20 ENCOUNTER — TELEMEDICINE (OUTPATIENT)
Dept: INTERNAL MEDICINE CLINIC | Facility: CLINIC | Age: 65
End: 2020-04-20
Payer: COMMERCIAL

## 2020-04-20 VITALS — HEIGHT: 68 IN | WEIGHT: 170 LBS | BODY MASS INDEX: 25.76 KG/M2

## 2020-04-20 DIAGNOSIS — D72.829 LEUKOCYTOSIS, UNSPECIFIED TYPE: ICD-10-CM

## 2020-04-20 DIAGNOSIS — Z72.0 TOBACCO ABUSE: ICD-10-CM

## 2020-04-20 DIAGNOSIS — J30.9 ALLERGIC RHINITIS, UNSPECIFIED SEASONALITY, UNSPECIFIED TRIGGER: ICD-10-CM

## 2020-04-20 DIAGNOSIS — Z79.4 TYPE 2 DIABETES MELLITUS WITH HYPERGLYCEMIA, WITH LONG-TERM CURRENT USE OF INSULIN (HCC): Primary | ICD-10-CM

## 2020-04-20 DIAGNOSIS — E11.65 TYPE 2 DIABETES MELLITUS WITH HYPERGLYCEMIA, WITH LONG-TERM CURRENT USE OF INSULIN (HCC): Primary | ICD-10-CM

## 2020-04-20 DIAGNOSIS — E78.2 MIXED HYPERLIPIDEMIA: ICD-10-CM

## 2020-04-20 DIAGNOSIS — E55.9 VITAMIN D DEFICIENCY: ICD-10-CM

## 2020-04-20 PROBLEM — K08.9 TEETH PROBLEM: Status: RESOLVED | Noted: 2019-04-02 | Resolved: 2020-04-20

## 2020-04-20 PROCEDURE — 3008F BODY MASS INDEX DOCD: CPT | Performed by: FAMILY MEDICINE

## 2020-04-20 PROCEDURE — 99214 OFFICE O/P EST MOD 30 MIN: CPT | Performed by: FAMILY MEDICINE

## 2020-05-13 DIAGNOSIS — E11.65 TYPE 2 DIABETES MELLITUS WITH HYPERGLYCEMIA, UNSPECIFIED WHETHER LONG TERM INSULIN USE (HCC): ICD-10-CM

## 2020-05-13 PROCEDURE — 4010F ACE/ARB THERAPY RXD/TAKEN: CPT | Performed by: FAMILY MEDICINE

## 2020-05-13 RX ORDER — LISINOPRIL 2.5 MG/1
TABLET ORAL
Qty: 30 TABLET | Refills: 5 | Status: SHIPPED | OUTPATIENT
Start: 2020-05-13 | End: 2021-08-24 | Stop reason: SDUPTHER

## 2020-07-24 ENCOUNTER — TELEPHONE (OUTPATIENT)
Dept: INTERNAL MEDICINE CLINIC | Facility: CLINIC | Age: 65
End: 2020-07-24

## 2020-07-24 NOTE — TELEPHONE ENCOUNTER
Patients wife called and stated she lost her insurance and his [place] is $600 a month  She cannot afford this  Did advise her that she can try going to [facility] and they do have hardship forms and coupons, as well as some assistance options  This may be a cheaper alternative  Patients wife is going to look into this option and let us know

## 2020-08-21 ENCOUNTER — TELEPHONE (OUTPATIENT)
Dept: GASTROENTEROLOGY | Facility: CLINIC | Age: 65
End: 2020-08-21

## 2020-08-21 NOTE — TELEPHONE ENCOUNTER
Called patient to arrange colonoscopy with Dr Natacha Vazquez as per recall  Patient stated he does not have insurance at this time and prefers a call back in 1 month  Reminder set

## 2020-09-04 ENCOUNTER — TELEPHONE (OUTPATIENT)
Dept: INTERNAL MEDICINE CLINIC | Facility: CLINIC | Age: 65
End: 2020-09-04

## 2020-09-04 DIAGNOSIS — E11.65 TYPE 2 DIABETES MELLITUS WITH HYPERGLYCEMIA, WITH LONG-TERM CURRENT USE OF INSULIN (HCC): ICD-10-CM

## 2020-09-04 DIAGNOSIS — E11.65 TYPE 2 DIABETES MELLITUS WITH HYPERGLYCEMIA, UNSPECIFIED WHETHER LONG TERM INSULIN USE (HCC): ICD-10-CM

## 2020-09-04 DIAGNOSIS — Z79.4 TYPE 2 DIABETES MELLITUS WITH HYPERGLYCEMIA, WITH LONG-TERM CURRENT USE OF INSULIN (HCC): ICD-10-CM

## 2020-09-04 RX ORDER — INSULIN DEGLUDEC INJECTION 100 U/ML
24 INJECTION, SOLUTION SUBCUTANEOUS DAILY
Qty: 10 PEN | Refills: 3 | Status: CANCELLED | OUTPATIENT
Start: 2020-09-04

## 2020-09-09 ENCOUNTER — TELEPHONE (OUTPATIENT)
Dept: INTERNAL MEDICINE CLINIC | Facility: CLINIC | Age: 65
End: 2020-09-09

## 2020-09-09 NOTE — TELEPHONE ENCOUNTER
Patient stopped in office to inform you that he cannot get the [place] with financial assistance due to "high" income  He is asking if there's something like pills similar to these medications that would be less expensive

## 2020-09-23 ENCOUNTER — TELEPHONE (OUTPATIENT)
Dept: GASTROENTEROLOGY | Facility: CLINIC | Age: 65
End: 2020-09-23

## 2020-09-23 NOTE — TELEPHONE ENCOUNTER
Called patient to schedule repeat Colonoscopy  He stated his wife lost their insurance  They did purchase insurance and are waiting to see what it covers  He prefers to contact our office to schedule when he is ready

## 2020-10-07 DIAGNOSIS — E11.65 TYPE 2 DIABETES MELLITUS WITH HYPERGLYCEMIA, UNSPECIFIED WHETHER LONG TERM INSULIN USE (HCC): ICD-10-CM

## 2020-10-08 ENCOUNTER — TELEPHONE (OUTPATIENT)
Dept: GASTROENTEROLOGY | Facility: CLINIC | Age: 65
End: 2020-10-08

## 2020-10-15 DIAGNOSIS — E78.2 MIXED HYPERLIPIDEMIA: ICD-10-CM

## 2020-10-15 RX ORDER — PRAVASTATIN SODIUM 20 MG
TABLET ORAL
Qty: 30 TABLET | Refills: 4 | Status: SHIPPED | OUTPATIENT
Start: 2020-10-15 | End: 2021-08-24 | Stop reason: SDUPTHER

## 2021-07-30 ENCOUNTER — TELEPHONE (OUTPATIENT)
Dept: INTERNAL MEDICINE CLINIC | Facility: CLINIC | Age: 66
End: 2021-07-30

## 2021-07-30 NOTE — TELEPHONE ENCOUNTER
I called and spoke to patient to schedule him for an annual exam and he said that he came off of all medications because he cannot afford and his insurance is not good  He said to make the appointment for 10/6 but he may cancel it

## 2021-08-18 ENCOUNTER — APPOINTMENT (OUTPATIENT)
Dept: LAB | Facility: CLINIC | Age: 66
End: 2021-08-18
Payer: COMMERCIAL

## 2021-08-18 DIAGNOSIS — E55.9 VITAMIN D DEFICIENCY: ICD-10-CM

## 2021-08-18 DIAGNOSIS — D72.829 LEUKOCYTOSIS, UNSPECIFIED TYPE: ICD-10-CM

## 2021-08-18 DIAGNOSIS — E87.1 HYPONATREMIA: ICD-10-CM

## 2021-08-18 DIAGNOSIS — E11.65 TYPE 2 DIABETES MELLITUS WITH HYPERGLYCEMIA, UNSPECIFIED WHETHER LONG TERM INSULIN USE (HCC): ICD-10-CM

## 2021-08-18 DIAGNOSIS — E78.2 MIXED HYPERLIPIDEMIA: ICD-10-CM

## 2021-08-18 DIAGNOSIS — Z72.0 TOBACCO ABUSE: ICD-10-CM

## 2021-08-18 DIAGNOSIS — Z12.5 SCREENING FOR PROSTATE CANCER: ICD-10-CM

## 2021-08-18 LAB
25(OH)D3 SERPL-MCNC: 19.1 NG/ML (ref 30–100)
ALBUMIN SERPL BCP-MCNC: 3.3 G/DL (ref 3.5–5)
ALP SERPL-CCNC: 90 U/L (ref 46–116)
ALT SERPL W P-5'-P-CCNC: 25 U/L (ref 12–78)
ANION GAP SERPL CALCULATED.3IONS-SCNC: 5 MMOL/L (ref 4–13)
AST SERPL W P-5'-P-CCNC: 16 U/L (ref 5–45)
BASOPHILS # BLD AUTO: 0.09 THOUSANDS/ΜL (ref 0–0.1)
BASOPHILS NFR BLD AUTO: 1 % (ref 0–1)
BILIRUB SERPL-MCNC: 1.42 MG/DL (ref 0.2–1)
BUN SERPL-MCNC: 12 MG/DL (ref 5–25)
CALCIUM ALBUM COR SERPL-MCNC: 9.3 MG/DL (ref 8.3–10.1)
CALCIUM SERPL-MCNC: 8.7 MG/DL (ref 8.3–10.1)
CHLORIDE SERPL-SCNC: 101 MMOL/L (ref 100–108)
CHOLEST SERPL-MCNC: 233 MG/DL (ref 50–200)
CO2 SERPL-SCNC: 25 MMOL/L (ref 21–32)
CREAT SERPL-MCNC: 0.7 MG/DL (ref 0.6–1.3)
EOSINOPHIL # BLD AUTO: 0.4 THOUSAND/ΜL (ref 0–0.61)
EOSINOPHIL NFR BLD AUTO: 6 % (ref 0–6)
ERYTHROCYTE [DISTWIDTH] IN BLOOD BY AUTOMATED COUNT: 11.5 % (ref 11.6–15.1)
EST. AVERAGE GLUCOSE BLD GHB EST-MCNC: 306 MG/DL
GFR SERPL CREATININE-BSD FRML MDRD: 100 ML/MIN/1.73SQ M
GLUCOSE P FAST SERPL-MCNC: 363 MG/DL (ref 65–99)
HBA1C MFR BLD: 12.3 %
HCT VFR BLD AUTO: 47 % (ref 36.5–49.3)
HDLC SERPL-MCNC: 53 MG/DL
HGB BLD-MCNC: 16.5 G/DL (ref 12–17)
IMM GRANULOCYTES # BLD AUTO: 0.02 THOUSAND/UL (ref 0–0.2)
IMM GRANULOCYTES NFR BLD AUTO: 0 % (ref 0–2)
LDLC SERPL CALC-MCNC: 141 MG/DL (ref 0–100)
LYMPHOCYTES # BLD AUTO: 2.33 THOUSANDS/ΜL (ref 0.6–4.47)
LYMPHOCYTES NFR BLD AUTO: 33 % (ref 14–44)
MCH RBC QN AUTO: 30.1 PG (ref 26.8–34.3)
MCHC RBC AUTO-ENTMCNC: 35.1 G/DL (ref 31.4–37.4)
MCV RBC AUTO: 86 FL (ref 82–98)
MONOCYTES # BLD AUTO: 0.53 THOUSAND/ΜL (ref 0.17–1.22)
MONOCYTES NFR BLD AUTO: 8 % (ref 4–12)
NEUTROPHILS # BLD AUTO: 3.7 THOUSANDS/ΜL (ref 1.85–7.62)
NEUTS SEG NFR BLD AUTO: 52 % (ref 43–75)
NONHDLC SERPL-MCNC: 180 MG/DL
NRBC BLD AUTO-RTO: 0 /100 WBCS
PLATELET # BLD AUTO: 238 THOUSANDS/UL (ref 149–390)
PMV BLD AUTO: 10.4 FL (ref 8.9–12.7)
POTASSIUM SERPL-SCNC: 4.4 MMOL/L (ref 3.5–5.3)
PROT SERPL-MCNC: 7.4 G/DL (ref 6.4–8.2)
PSA SERPL-MCNC: 0.6 NG/ML (ref 0–4)
RBC # BLD AUTO: 5.48 MILLION/UL (ref 3.88–5.62)
SODIUM SERPL-SCNC: 131 MMOL/L (ref 136–145)
TRIGL SERPL-MCNC: 197 MG/DL
TSH SERPL DL<=0.05 MIU/L-ACNC: 0.76 UIU/ML (ref 0.36–3.74)
WBC # BLD AUTO: 7.07 THOUSAND/UL (ref 4.31–10.16)

## 2021-08-18 PROCEDURE — 84443 ASSAY THYROID STIM HORMONE: CPT

## 2021-08-18 PROCEDURE — 82306 VITAMIN D 25 HYDROXY: CPT

## 2021-08-18 PROCEDURE — 36415 COLL VENOUS BLD VENIPUNCTURE: CPT

## 2021-08-18 PROCEDURE — G0103 PSA SCREENING: HCPCS

## 2021-08-18 PROCEDURE — 80061 LIPID PANEL: CPT

## 2021-08-18 PROCEDURE — 80053 COMPREHEN METABOLIC PANEL: CPT

## 2021-08-18 PROCEDURE — 83036 HEMOGLOBIN GLYCOSYLATED A1C: CPT

## 2021-08-18 PROCEDURE — 85025 COMPLETE CBC W/AUTO DIFF WBC: CPT

## 2021-08-24 ENCOUNTER — OFFICE VISIT (OUTPATIENT)
Dept: INTERNAL MEDICINE CLINIC | Facility: CLINIC | Age: 66
End: 2021-08-24
Payer: COMMERCIAL

## 2021-08-24 VITALS
OXYGEN SATURATION: 98 % | SYSTOLIC BLOOD PRESSURE: 120 MMHG | WEIGHT: 155 LBS | BODY MASS INDEX: 24.33 KG/M2 | HEIGHT: 67 IN | DIASTOLIC BLOOD PRESSURE: 80 MMHG | TEMPERATURE: 97 F | HEART RATE: 95 BPM

## 2021-08-24 DIAGNOSIS — Z79.4 TYPE 2 DIABETES MELLITUS WITH HYPERGLYCEMIA, WITH LONG-TERM CURRENT USE OF INSULIN (HCC): Primary | ICD-10-CM

## 2021-08-24 DIAGNOSIS — E11.65 TYPE 2 DIABETES MELLITUS WITH HYPERGLYCEMIA, WITH LONG-TERM CURRENT USE OF INSULIN (HCC): Primary | ICD-10-CM

## 2021-08-24 DIAGNOSIS — R63.4 WEIGHT LOSS: ICD-10-CM

## 2021-08-24 DIAGNOSIS — E78.2 MIXED HYPERLIPIDEMIA: ICD-10-CM

## 2021-08-24 DIAGNOSIS — N52.9 ERECTILE DYSFUNCTION, UNSPECIFIED ERECTILE DYSFUNCTION TYPE: ICD-10-CM

## 2021-08-24 DIAGNOSIS — E55.9 VITAMIN D DEFICIENCY: ICD-10-CM

## 2021-08-24 DIAGNOSIS — E11.65 TYPE 2 DIABETES MELLITUS WITH HYPERGLYCEMIA, UNSPECIFIED WHETHER LONG TERM INSULIN USE (HCC): ICD-10-CM

## 2021-08-24 PROCEDURE — 99214 OFFICE O/P EST MOD 30 MIN: CPT | Performed by: NURSE PRACTITIONER

## 2021-08-24 RX ORDER — SILDENAFIL 50 MG/1
50 TABLET, FILM COATED ORAL DAILY PRN
Qty: 10 TABLET | Refills: 0 | Status: SHIPPED | OUTPATIENT
Start: 2021-08-24

## 2021-08-24 RX ORDER — ERGOCALCIFEROL 1.25 MG/1
50000 CAPSULE ORAL WEEKLY
Qty: 12 CAPSULE | Refills: 1 | Status: SHIPPED | OUTPATIENT
Start: 2021-08-24

## 2021-08-24 RX ORDER — LISINOPRIL 2.5 MG/1
2.5 TABLET ORAL DAILY
Qty: 30 TABLET | Refills: 5 | Status: SHIPPED | OUTPATIENT
Start: 2021-08-24 | End: 2022-02-21

## 2021-08-24 RX ORDER — PRAVASTATIN SODIUM 20 MG
20 TABLET ORAL DAILY
Qty: 30 TABLET | Refills: 4 | Status: SHIPPED | OUTPATIENT
Start: 2021-08-24 | End: 2022-02-18

## 2021-08-24 NOTE — PROGRESS NOTES
Assessment/Plan: A1C is at 12 3 and foot exam normal will start back on his Metformin, Pravastatin, and Zestril  Patient is wiling to recheck sugars  Did advise watching sugars and watch weight  Is deferring GI at this time  Did advise if continuing with testicular discomfort to call the office  Will repeat fasting labs in 3 months  Did have covid vaccines  Will call in Viagra for patient  If any issues did advise to call the office  No problem-specific Assessment & Plan notes found for this encounter  Problem List Items Addressed This Visit        Endocrine    Type 2 diabetes mellitus with hyperglycemia (HCC) - Primary    Relevant Medications    metFORMIN (GLUCOPHAGE) 850 mg tablet    lisinopril (ZESTRIL) 2 5 mg tablet    Other Relevant Orders    Comprehensive metabolic panel    CBC and differential    TSH, 3rd generation with Free T4 reflex    HEMOGLOBIN A1C W/ EAG ESTIMATION    Comprehensive metabolic panel    CBC and differential    TSH, 3rd generation with Free T4 reflex       Other    Hyperlipidemia    Relevant Medications    pravastatin (PRAVACHOL) 20 mg tablet    Other Relevant Orders    Comprehensive metabolic panel    CBC and differential    TSH, 3rd generation with Free T4 reflex    Lipid panel    Vitamin D deficiency    Relevant Medications    ergocalciferol (VITAMIN D2) 50,000 units    Other Relevant Orders    Comprehensive metabolic panel    CBC and differential    TSH, 3rd generation with Free T4 reflex    Erectile dysfunction    Relevant Medications    sildenafil (VIAGRA) 50 MG tablet    Weight loss            Subjective:      Patient ID: Hilario Lindsye  is a 63 y o  male  Eduarda Toussaint is for an acute visit  He states he is not taking his medications due to his insurance deductible being so high  He is no longer checking his sugars  He states he is willing to start some of them back but can not afford Invokana   He states he is loosing weight but thinks it is due to him having his teeth pulled  He states he was to see GI but could not afford to go back  He states he is having some pain and feelings of something pulling in his testicles  He states one the new year starts he will have another insurance and be able to afford screenings  He did have the covid vaccine  He offers no other issues  The following portions of the patient's history were reviewed and updated as appropriate: He  has a past medical history of Diabetes mellitus ([facility] [address] ), Hyperlipidemia, Hypertension, Pancreatitis, and Pancreatitis  He   Patient Active Problem List    Diagnosis Date Noted    Weight loss 08/24/2021    Allergic rhinitis 04/20/2020    Chronic sinusitis 12/10/2019    Leukocytosis 12/10/2019    Positive colorectal cancer screening using Cologuard test 07/25/2019    Muscle weakness 04/02/2019    Erectile dysfunction 04/02/2019    Primary osteoarthritis involving multiple joints 04/02/2019    Vitamin D deficiency 04/01/2019    Hyponatremia 06/16/2016    Type 2 diabetes mellitus with hyperglycemia ([facility] [address] ) 06/14/2016    Hyperlipidemia 06/14/2016    Tobacco abuse 06/14/2016     He  has a past surgical history that includes Ankle surgery and Incision and drainage of wound (Right, 6/15/2016)  His family history includes Dementia in his father; Diabetes in his mother  He  reports that he has quit smoking  His smoking use included cigarettes  He has a 40 00 pack-year smoking history  He has never used smokeless tobacco  He reports that he does not drink alcohol and does not use drugs    Current Outpatient Medications   Medication Sig Dispense Refill    ergocalciferol (VITAMIN D2) 50,000 units Take 1 capsule (50,000 Units total) by mouth once a week 12 capsule 1    lisinopril (ZESTRIL) 2 5 mg tablet Take 1 tablet (2 5 mg total) by mouth daily 30 tablet 5    metFORMIN (GLUCOPHAGE) 850 mg tablet Take 1 tablet (850 mg total) by mouth 3 (three) times a day 90 tablet 5    NOVOFINE 32G X 6 MM MISC USE ONCE DAILY AS DIRECTED (Patient not taking: Reported on 8/24/2021) 30 each 9    pravastatin (PRAVACHOL) 20 mg tablet Take 1 tablet (20 mg total) by mouth daily 30 tablet 4    sildenafil (VIAGRA) 50 MG tablet Take 1 tablet (50 mg total) by mouth daily as needed for erectile dysfunction 10 tablet 0    TRESIBA FLEXTOUCH 100 units/mL injection pen INJECT 24 UNITS SUBCUTANEOUSLY DAILY (Patient not taking: Reported on 8/24/2021) 15 pen 5     No current facility-administered medications for this visit  Current Outpatient Medications on File Prior to Visit   Medication Sig    NOVOFINE 32G X 6 MM MISC USE ONCE DAILY AS DIRECTED (Patient not taking: Reported on 8/24/2021)    TRESIBA FLEXTOUCH 100 units/mL injection pen INJECT 24 UNITS SUBCUTANEOUSLY DAILY (Patient not taking: Reported on 8/24/2021)    [DISCONTINUED] ergocalciferol (VITAMIN D2) 50,000 units Take 1 capsule (50,000 Units total) by mouth once a week (Patient not taking: Reported on 4/20/2020)    [DISCONTINUED] INVOKANA 100 MG TAKE 1 TABLET (100 MG TOTAL) BY MOUTH DAILY BEFORE BREAKFAST (Patient not taking: Reported on 8/24/2021)    [DISCONTINUED] lisinopril (ZESTRIL) 2 5 mg tablet TAKE 1 TABLET BY MOUTH EVERY DAY (Patient not taking: Reported on 8/24/2021)    [DISCONTINUED] metFORMIN (GLUCOPHAGE) 850 mg tablet Take 1 tablet (850 mg total) by mouth 3 (three) times a day (Patient not taking: Reported on 8/24/2021)    [DISCONTINUED] pravastatin (PRAVACHOL) 20 mg tablet TAKE 1 TABLET BY MOUTH EVERY DAY AT NIGHT (Patient not taking: Reported on 8/24/2021)     No current facility-administered medications on file prior to visit  He is allergic to penicillins       Review of Systems   Constitutional: Positive for unexpected weight change  Patient's shoes and socks removed  Right Foot/Ankle   Right Foot Inspection  Skin Exam: skin normal and skin intact no dry skin, no warmth, no callus, no erythema, no maceration, no abnormal color, no pre-ulcer, no ulcer and no callus                          Toe Exam: ROM and strength within normal limits  Sensory   Vibration: intact  Proprioception: intact   Monofilament testing: intact  Vascular  Capillary refills: < 3 seconds  The right DP pulse is 2+  The right PT pulse is 2+  Left Foot/Ankle  Left Foot Inspection  Skin Exam: skin normal and skin intactno dry skin, no warmth, no erythema, no maceration, normal color, no pre-ulcer, no ulcer and no callus                         Toe Exam: ROM and strength within normal limits                   Sensory   Vibration: intact  Proprioception: intact  Monofilament: intact  Vascular  Capillary refills: < 3 seconds  The left DP pulse is 2+  The left PT pulse is 2+  Assign Risk Category:  No deformity present; No loss of protective sensation; No weak pulses       Risk: 0      Objective:      /80 (BP Location: Left arm, Patient Position: Sitting, Cuff Size: Adult)   Pulse 95   Temp (!) 97 °F (36 1 °C) (Temporal)   Ht 5' 7" (1 702 m)   Wt 70 3 kg (155 lb)   SpO2 98%   BMI 24 28 kg/m²          Physical Exam  Vitals reviewed  Constitutional:       Appearance: Normal appearance  He is normal weight  HENT:      Head: Normocephalic and atraumatic  Right Ear: Tympanic membrane, ear canal and external ear normal       Left Ear: Tympanic membrane, ear canal and external ear normal       Mouth/Throat:      Mouth: Mucous membranes are moist       Pharynx: Oropharynx is clear  Eyes:      Extraocular Movements: Extraocular movements intact  Conjunctiva/sclera: Conjunctivae normal       Pupils: Pupils are equal, round, and reactive to light  Cardiovascular:      Rate and Rhythm: Normal rate and regular rhythm  Pulses: Normal pulses  no weak pulses          Dorsalis pedis pulses are 2+ on the right side and 2+ on the left side  Posterior tibial pulses are 2+ on the right side and 2+ on the left side  Heart sounds: Normal heart sounds  Pulmonary:      Effort: Pulmonary effort is normal       Breath sounds: Normal breath sounds  Abdominal:      General: Abdomen is flat  Bowel sounds are normal       Palpations: Abdomen is soft  Musculoskeletal:         General: Normal range of motion  Cervical back: Normal range of motion and neck supple  Feet:    Feet:      Right foot:      Skin integrity: No ulcer, skin breakdown, erythema, warmth, callus or dry skin  Left foot:      Skin integrity: No ulcer, skin breakdown, erythema, warmth, callus or dry skin  Skin:     General: Skin is warm and dry  Capillary Refill: Capillary refill takes less than 2 seconds  Neurological:      General: No focal deficit present  Mental Status: He is alert and oriented to person, place, and time  Mental status is at baseline  Psychiatric:         Mood and Affect: Mood normal          Behavior: Behavior normal          Thought Content: Thought content normal          Judgment: Judgment normal          BMI Counseling: Body mass index is 24 28 kg/m²  The BMI is below normal  Patient was advised to gain weight

## 2021-12-06 ENCOUNTER — OFFICE VISIT (OUTPATIENT)
Dept: INTERNAL MEDICINE CLINIC | Facility: CLINIC | Age: 66
End: 2021-12-06
Payer: COMMERCIAL

## 2021-12-06 VITALS
BODY MASS INDEX: 24.64 KG/M2 | HEIGHT: 67 IN | OXYGEN SATURATION: 97 % | SYSTOLIC BLOOD PRESSURE: 152 MMHG | WEIGHT: 157 LBS | DIASTOLIC BLOOD PRESSURE: 94 MMHG | HEART RATE: 111 BPM | TEMPERATURE: 97.8 F

## 2021-12-06 DIAGNOSIS — M15.9 PRIMARY OSTEOARTHRITIS INVOLVING MULTIPLE JOINTS: ICD-10-CM

## 2021-12-06 DIAGNOSIS — Z12.11 SCREENING FOR COLON CANCER: ICD-10-CM

## 2021-12-06 DIAGNOSIS — E78.2 MIXED HYPERLIPIDEMIA: ICD-10-CM

## 2021-12-06 DIAGNOSIS — R19.5 POSITIVE COLORECTAL CANCER SCREENING USING COLOGUARD TEST: ICD-10-CM

## 2021-12-06 DIAGNOSIS — Z79.4 TYPE 2 DIABETES MELLITUS WITH HYPERGLYCEMIA, WITH LONG-TERM CURRENT USE OF INSULIN (HCC): Primary | ICD-10-CM

## 2021-12-06 DIAGNOSIS — E11.65 TYPE 2 DIABETES MELLITUS WITH HYPERGLYCEMIA, WITH LONG-TERM CURRENT USE OF INSULIN (HCC): Primary | ICD-10-CM

## 2021-12-06 DIAGNOSIS — E55.9 VITAMIN D DEFICIENCY: ICD-10-CM

## 2021-12-06 PROCEDURE — 99214 OFFICE O/P EST MOD 30 MIN: CPT | Performed by: FAMILY MEDICINE

## 2021-12-06 RX ORDER — GLIMEPIRIDE 2 MG/1
2 TABLET ORAL
Qty: 90 TABLET | Refills: 3 | Status: SHIPPED | OUTPATIENT
Start: 2021-12-06

## 2022-02-18 DIAGNOSIS — E78.2 MIXED HYPERLIPIDEMIA: ICD-10-CM

## 2022-02-18 RX ORDER — PRAVASTATIN SODIUM 20 MG
TABLET ORAL
Qty: 90 TABLET | Refills: 1 | Status: SHIPPED | OUTPATIENT
Start: 2022-02-18

## 2022-02-19 DIAGNOSIS — E11.65 TYPE 2 DIABETES MELLITUS WITH HYPERGLYCEMIA, UNSPECIFIED WHETHER LONG TERM INSULIN USE (HCC): ICD-10-CM

## 2022-02-20 DIAGNOSIS — E11.65 TYPE 2 DIABETES MELLITUS WITH HYPERGLYCEMIA, UNSPECIFIED WHETHER LONG TERM INSULIN USE (HCC): ICD-10-CM

## 2022-02-21 RX ORDER — LISINOPRIL 2.5 MG/1
TABLET ORAL
Qty: 90 TABLET | Refills: 1 | Status: SHIPPED | OUTPATIENT
Start: 2022-02-21

## 2022-09-19 DIAGNOSIS — E11.65 TYPE 2 DIABETES MELLITUS WITH HYPERGLYCEMIA, UNSPECIFIED WHETHER LONG TERM INSULIN USE (HCC): ICD-10-CM

## 2022-09-20 RX ORDER — LISINOPRIL 2.5 MG/1
TABLET ORAL
Qty: 90 TABLET | Refills: 1 | Status: SHIPPED | OUTPATIENT
Start: 2022-09-20

## 2022-11-29 DIAGNOSIS — E11.65 TYPE 2 DIABETES MELLITUS WITH HYPERGLYCEMIA, UNSPECIFIED WHETHER LONG TERM INSULIN USE (HCC): ICD-10-CM

## 2022-11-29 DIAGNOSIS — E78.2 MIXED HYPERLIPIDEMIA: ICD-10-CM

## 2022-11-30 RX ORDER — PRAVASTATIN SODIUM 20 MG
TABLET ORAL
Qty: 90 TABLET | Refills: 1 | Status: SHIPPED | OUTPATIENT
Start: 2022-11-30

## 2022-12-07 ENCOUNTER — APPOINTMENT (EMERGENCY)
Dept: CT IMAGING | Facility: HOSPITAL | Age: 67
End: 2022-12-07

## 2022-12-07 ENCOUNTER — HOSPITAL ENCOUNTER (EMERGENCY)
Facility: HOSPITAL | Age: 67
Discharge: HOME | End: 2022-12-07
Attending: EMERGENCY MEDICINE

## 2022-12-07 VITALS
DIASTOLIC BLOOD PRESSURE: 71 MMHG | RESPIRATION RATE: 18 BRPM | WEIGHT: 157 LBS | OXYGEN SATURATION: 95 % | HEART RATE: 99 BPM | BODY MASS INDEX: 24.59 KG/M2 | TEMPERATURE: 97.4 F | SYSTOLIC BLOOD PRESSURE: 109 MMHG

## 2022-12-07 DIAGNOSIS — R73.9 HYPERGLYCEMIA: Primary | ICD-10-CM

## 2022-12-07 LAB
ALBUMIN SERPL BCP-MCNC: 4.2 G/DL (ref 3.5–5)
ALP SERPL-CCNC: 76 U/L (ref 34–104)
ALT SERPL W P-5'-P-CCNC: 15 U/L (ref 7–52)
ANION GAP SERPL CALCULATED.3IONS-SCNC: 12 MMOL/L (ref 4–13)
AST SERPL W P-5'-P-CCNC: 13 U/L (ref 13–39)
BASE EX.OXY STD BLDV CALC-SCNC: 53.4 % (ref 60–80)
BASE EXCESS BLDV CALC-SCNC: 1.4 MMOL/L
BASOPHILS # BLD AUTO: 0.07 THOUSANDS/ÂΜL (ref 0–0.1)
BASOPHILS NFR BLD AUTO: 1 % (ref 0–1)
BETA-HYDROXYBUTYRATE: 0.4 MMOL/L
BILIRUB SERPL-MCNC: 1.13 MG/DL (ref 0.2–1)
BILIRUB UR QL STRIP: NEGATIVE
BUN SERPL-MCNC: 13 MG/DL (ref 5–25)
CALCIUM SERPL-MCNC: 9.5 MG/DL (ref 8.4–10.2)
CHLORIDE SERPL-SCNC: 90 MMOL/L (ref 96–108)
CLARITY UR: CLEAR
CO2 SERPL-SCNC: 28 MMOL/L (ref 21–32)
COLOR UR: YELLOW
CREAT SERPL-MCNC: 0.82 MG/DL (ref 0.6–1.3)
EOSINOPHIL # BLD AUTO: 0.42 THOUSAND/ÂΜL (ref 0–0.61)
EOSINOPHIL NFR BLD AUTO: 4 % (ref 0–6)
ERYTHROCYTE [DISTWIDTH] IN BLOOD BY AUTOMATED COUNT: 11.3 % (ref 11.6–15.1)
FLUAV RNA RESP QL NAA+PROBE: NEGATIVE
FLUBV RNA RESP QL NAA+PROBE: NEGATIVE
GFR SERPL CREATININE-BSD FRML MDRD: 92 ML/MIN/1.73SQ M
GLUCOSE SERPL-MCNC: 326 MG/DL (ref 65–140)
GLUCOSE SERPL-MCNC: 461 MG/DL (ref 65–140)
GLUCOSE UR STRIP-MCNC: ABNORMAL MG/DL
HCO3 BLDV-SCNC: 27.2 MMOL/L (ref 24–30)
HCT VFR BLD AUTO: 50.1 % (ref 36.5–49.3)
HGB BLD-MCNC: 17.5 G/DL (ref 12–17)
HGB UR QL STRIP.AUTO: NEGATIVE
IMM GRANULOCYTES # BLD AUTO: 0.03 THOUSAND/UL (ref 0–0.2)
IMM GRANULOCYTES NFR BLD AUTO: 0 % (ref 0–2)
KETONES UR STRIP-MCNC: ABNORMAL MG/DL
LEUKOCYTE ESTERASE UR QL STRIP: NEGATIVE
LIPASE SERPL-CCNC: 91 U/L (ref 11–82)
LYMPHOCYTES # BLD AUTO: 3.36 THOUSANDS/ÂΜL (ref 0.6–4.47)
LYMPHOCYTES NFR BLD AUTO: 32 % (ref 14–44)
MCH RBC QN AUTO: 30.4 PG (ref 26.8–34.3)
MCHC RBC AUTO-ENTMCNC: 34.9 G/DL (ref 31.4–37.4)
MCV RBC AUTO: 87 FL (ref 82–98)
MONOCYTES # BLD AUTO: 0.81 THOUSAND/ÂΜL (ref 0.17–1.22)
MONOCYTES NFR BLD AUTO: 8 % (ref 4–12)
NEUTROPHILS # BLD AUTO: 5.8 THOUSANDS/ÂΜL (ref 1.85–7.62)
NEUTS SEG NFR BLD AUTO: 55 % (ref 43–75)
NITRITE UR QL STRIP: NEGATIVE
NRBC BLD AUTO-RTO: 0 /100 WBCS
O2 CT BLDV-SCNC: 14 ML/DL
PCO2 BLDV: 46.4 MM HG (ref 42–50)
PH BLDV: 7.39 [PH] (ref 7.3–7.4)
PH UR STRIP.AUTO: 6 [PH]
PLATELET # BLD AUTO: 310 THOUSANDS/UL (ref 149–390)
PMV BLD AUTO: 9.6 FL (ref 8.9–12.7)
PO2 BLDV: 26.7 MM HG (ref 35–45)
POTASSIUM SERPL-SCNC: 4.5 MMOL/L (ref 3.5–5.3)
PROT SERPL-MCNC: 8.1 G/DL (ref 6.4–8.4)
PROT UR STRIP-MCNC: NEGATIVE MG/DL
RBC # BLD AUTO: 5.76 MILLION/UL (ref 3.88–5.62)
RSV RNA RESP QL NAA+PROBE: NEGATIVE
SARS-COV-2 RNA RESP QL NAA+PROBE: NEGATIVE
SODIUM SERPL-SCNC: 130 MMOL/L (ref 135–147)
SP GR UR STRIP.AUTO: 1.01
UROBILINOGEN UR QL STRIP.AUTO: 0.2 E.U./DL
WBC # BLD AUTO: 10.49 THOUSAND/UL (ref 4.31–10.16)

## 2022-12-07 RX ADMIN — IOHEXOL 100 ML: 350 INJECTION, SOLUTION INTRAVENOUS at 15:59

## 2022-12-07 RX ADMIN — INSULIN HUMAN 5 UNITS: 100 INJECTION, SOLUTION PARENTERAL at 17:24

## 2022-12-07 RX ADMIN — INSULIN DETEMIR 8 UNITS: 100 INJECTION, SOLUTION SUBCUTANEOUS at 17:25

## 2022-12-07 RX ADMIN — SODIUM CHLORIDE 1000 ML: 0.9 INJECTION, SOLUTION INTRAVENOUS at 16:07

## 2022-12-07 NOTE — DISCHARGE INSTRUCTIONS
Please start checking your sugars in the morning  Start taking 4 mg of glimepiride  Follow-up with your PCP for further management of your diabetes  If you develop any new, concerning, worsening symptoms please return to the emergency department for reevaluation

## 2022-12-07 NOTE — ED PROVIDER NOTES
History  Chief Complaint   Patient presents with   • Abdominal Pain     Pt reports lower abdominal pain x2 days      79-year-old male with history of diabetes, hypertension, hyperlipidemia presents to the emergency department for evaluation of generalized abdominal pain that he describes as a constant ache  Symptoms have been occurring for the past 2 days  Localizes his pain to the epigastric area  No other complaints at this time  Denies chest pain, shortness of breath, nausea, vomiting, dysuria, hematuria, diarrhea, constipation, headache, dizziness, vision changes  No new recent medication changes  Patient currently takes metformin and glimepiride for his diabetes  He used to be on insulin, but discontinued its use due to insurance reasons  Sugars typically run in the upper 200s  No associated trauma that precipitated his pain  History provided by:  Patient   used: No        Prior to Admission Medications   Prescriptions Last Dose Informant Patient Reported? Taking?   ergocalciferol (VITAMIN D2) 50,000 units   No No   Sig: Take 1 capsule (50,000 Units total) by mouth once a week   glimepiride (AMARYL) 2 mg tablet   No No   Sig: Take 1 tablet (2 mg total) by mouth daily with breakfast   lisinopril (ZESTRIL) 2 5 mg tablet   No No   Sig: TAKE 1 TABLET BY MOUTH EVERY DAY   metFORMIN (GLUCOPHAGE) 850 mg tablet   No No   Sig: TAKE 1 TABLET BY MOUTH 3 TIMES A DAY     pravastatin (PRAVACHOL) 20 mg tablet   No No   Sig: TAKE 1 TABLET BY MOUTH EVERY DAY   sildenafil (VIAGRA) 50 MG tablet   No No   Sig: Take 1 tablet (50 mg total) by mouth daily as needed for erectile dysfunction      Facility-Administered Medications: None       Past Medical History:   Diagnosis Date   • Diabetes mellitus ([facility] [address] )    • Hyperlipidemia    • Hypertension    • Pancreatitis    • Pancreatitis    • Tobacco abuse 6/14/2016       Past Surgical History:   Procedure Laterality Date   • ANKLE SURGERY     • INCISION AND DRAINAGE OF WOUND Right 6/15/2016    Procedure: INCISION AND DRAINAGE (I&D) EXTREMITY;  Surgeon: Christina Ji MD;  Location: [facility];  Service:        Family History   Problem Relation Age of Onset   • Diabetes Mother    • Dementia Father      I have reviewed and agree with the history as documented  E-Cigarette/Vaping   • E-Cigarette Use Never User      E-Cigarette/Vaping Substances     Social History     Tobacco Use   • Smoking status: Former     Packs/day: 2 00     Years: 20 00     Pack years: 40 00     Types: Cigarettes   • Smokeless tobacco: Never   Vaping Use   • Vaping Use: Never used   Substance Use Topics   • Alcohol use: No   • Drug use: No       Review of Systems   Constitutional: Negative for chills and fever  HENT: Negative for ear pain and sore throat  Eyes: Negative for pain and visual disturbance  Respiratory: Negative for cough and shortness of breath  Cardiovascular: Negative for chest pain and palpitations  Gastrointestinal: Positive for abdominal pain  Negative for vomiting  Genitourinary: Negative for dysuria and hematuria  Musculoskeletal: Negative for arthralgias and back pain  Skin: Negative for color change and rash  Neurological: Negative for seizures and syncope  All other systems reviewed and are negative  Physical Exam  Physical Exam  Vitals and nursing note reviewed  Constitutional:       General: He is not in acute distress  Appearance: Normal appearance  He is well-developed and normal weight  He is not ill-appearing  HENT:      Head: Normocephalic and atraumatic  Right Ear: External ear normal       Left Ear: External ear normal       Nose: Nose normal       Mouth/Throat:      Mouth: Mucous membranes are moist       Pharynx: Oropharynx is clear  Eyes:      General: No scleral icterus  Right eye: No discharge  Left eye: No discharge        Conjunctiva/sclera: Conjunctivae normal       Pupils: Pupils are equal, round, and reactive to light  Cardiovascular:      Rate and Rhythm: Normal rate  Pulses: Normal pulses  Heart sounds: Normal heart sounds  No murmur heard  Pulmonary:      Effort: Pulmonary effort is normal  No respiratory distress  Abdominal:      General: Abdomen is flat  Bowel sounds are normal       Palpations: Abdomen is soft  Tenderness: There is no abdominal tenderness  There is no right CVA tenderness or left CVA tenderness  Musculoskeletal:         General: No swelling, tenderness or signs of injury  Normal range of motion  Cervical back: Normal range of motion and neck supple  No rigidity  Skin:     General: Skin is warm and dry  Capillary Refill: Capillary refill takes less than 2 seconds  Findings: No bruising, erythema or rash  Neurological:      General: No focal deficit present  Mental Status: He is alert and oriented to person, place, and time  Mental status is at baseline  Psychiatric:         Mood and Affect: Mood normal          Behavior: Behavior normal          Thought Content:  Thought content normal          Vital Signs  ED Triage Vitals [12/07/22 1334]   Temperature Pulse Respirations Blood Pressure SpO2   (!) 97 4 °F (36 3 °C) (!) 115 18 109/71 94 %      Temp Source Heart Rate Source Patient Position - Orthostatic VS BP Location FiO2 (%)   Tympanic -- -- Left arm --      Pain Score       4           Vitals:    12/07/22 1334 12/07/22 1610   BP: 109/71    Pulse: (!) 115 99         Visual Acuity      ED Medications  Medications   sodium chloride 0 9 % bolus 1,000 mL (0 mL Intravenous Stopped 12/7/22 1803)   iohexol (OMNIPAQUE) 350 MG/ML injection (SINGLE-DOSE) 100 mL (100 mL Intravenous Given 12/7/22 1559)   insulin detemir (LEVEMIR) subcutaneous injection 8 Units (8 Units Subcutaneous Given 12/7/22 1725)   insulin regular (HumuLIN R,NovoLIN R) injection 5 Units (5 Units Subcutaneous Given 12/7/22 1724)       Diagnostic Studies  Results Reviewed     Procedure Component Value Units Date/Time    Fingerstick Glucose (POCT) [[id number]]  (Abnormal) Collected: 12/07/22 1809    Lab Status: Final result Updated: 12/07/22 1809     POC Glucose 326 mg/dl     FLU/RSV/COVID - if FLU/RSV clinically relevant [[id number]]  (Normal) Collected: 12/07/22 1543    Lab Status: Final result Specimen: Nares from Nasopharyngeal Swab Updated: 12/07/22 1643     SARS-CoV-2 Negative     INFLUENZA A PCR Negative     INFLUENZA B PCR Negative     RSV PCR Negative    Narrative:      FOR PEDIATRIC PATIENTS - copy/paste COVID Guidelines URL to browser: [email or web address]    SARS-CoV-2 assay is a Nucleic Acid Amplification assay intended for the  qualitative detection of nucleic acid from SARS-CoV-2 in nasopharyngeal  swabs  Results are for the presumptive identification of SARS-CoV-2 RNA  Positive results are indicative of infection with SARS-CoV-2, the virus  causing COVID-19, but do not rule out bacterial infection or co-infection  with other viruses  Laboratories within the [place] and its  territories are required to report all positive results to the appropriate  public health authorities  Negative results do not preclude SARS-CoV-2  infection and should not be used as the sole basis for treatment or other  patient management decisions  Negative results must be combined with  clinical observations, patient history, and epidemiological information  This test has not been FDA cleared or approved  This test has been authorized by FDA under an Emergency Use Authorization  (EUA)  This test is only authorized for the duration of time the  declaration that circumstances exist justifying the authorization of the  emergency use of an in vitro diagnostic tests for detection of SARS-CoV-2  virus and/or diagnosis of COVID-19 infection under section 564(b)(1) of  the Act, 21 U  S C  799VCH-6(S)(9), unless the authorization is terminated  or revoked sooner  The test has been validated but independent review by FDA  and CLIA is pending  Test performed using [employer] Gene[employer]pert: This RT-PCR assay targets N2,  a region unique to SARS-CoV-2  A conserved region in the E-gene was chosen  for pan-Sarbecovirus detection which includes SARS-CoV-2  According to CMS-2020-01-R, this platform meets the definition of high-throughput technology      Beta Hydroxybutyrate [[id number]]  (Normal) Collected: 12/07/22 1543    Lab Status: Final result Specimen: Blood from Arm, Right Updated: 12/07/22 1605     BETA-HYDROXYBUTYRATE 0 4 mmol/L     UA w Reflex to Microscopic w Reflex to Culture [[id number]]  (Abnormal) Collected: 12/07/22 1545    Lab Status: Final result Specimen: Urine, Clean Catch Updated: 12/07/22 1604     Color, UA Yellow     Clarity, UA Clear     Specific Gravity, UA 1 010     pH, UA 6 0     Leukocytes, UA Negative     Nitrite, UA Negative     Protein, UA Negative mg/dl      Glucose, UA 3+ mg/dl      Ketones, UA Trace mg/dl      Urobilinogen, UA 0 2 E U /dl      Bilirubin, UA Negative     Occult Blood, UA Negative    Blood gas, venous [[id number]]  (Abnormal) Collected: 12/07/22 1543    Lab Status: Final result Specimen: Blood from Arm, Right Updated: 12/07/22 1559     pH, Dexter 7 386     pCO2, Dexter 46 4 mm Hg      pO2, Dexter 26 7 mm Hg      HCO3, Dexter 27 2 mmol/L      Base Excess, Dexter 1 4 mmol/L      O2 Content, Dexter 14 0 ml/dL      O2 HGB, VENOUS 53 4 %     Comprehensive metabolic panel [[id number]]  (Abnormal) Collected: 12/07/22 1420    Lab Status: Final result Specimen: Blood from Arm, Left Updated: 12/07/22 1512     Sodium 130 mmol/L      Potassium 4 5 mmol/L      Chloride 90 mmol/L      CO2 28 mmol/L      ANION GAP 12 mmol/L      BUN 13 mg/dL      Creatinine 0 82 mg/dL      Glucose 461 mg/dL      Calcium 9 5 mg/dL      AST 13 U/L      ALT 15 U/L      Alkaline Phosphatase 76 U/L      Total Protein 8 1 g/dL      Albumin 4 2 g/dL      Total Bilirubin 1 13 mg/dL eGFR 92 ml/min/1 73sq m     Narrative:      [place] guidelines for Chronic Kidney Disease (CKD):   •  Stage 1 with normal or high GFR (GFR > 90 mL/min/1 73 square meters)  •  Stage 2 Mild CKD (GFR = 60-89 mL/min/1 73 square meters)  •  Stage 3A Moderate CKD (GFR = 45-59 mL/min/1 73 square meters)  •  Stage 3B Moderate CKD (GFR = 30-44 mL/min/1 73 square meters)  •  Stage 4 Severe CKD (GFR = 15-29 mL/min/1 73 square meters)  •  Stage 5 End Stage CKD (GFR <15 mL/min/1 73 square meters)  Note: GFR calculation is accurate only with a steady state creatinine    Lipase [[id number]]  (Abnormal) Collected: 12/07/22 1420    Lab Status: Final result Specimen: Blood from Arm, Left Updated: 12/07/22 1512     Lipase 91 u/L     CBC and differential [[id number]]  (Abnormal) Collected: 12/07/22 1420    Lab Status: Final result Specimen: Blood from Arm, Left Updated: 12/07/22 1445     WBC 10 49 Thousand/uL      RBC 5 76 Million/uL      Hemoglobin 17 5 g/dL      Hematocrit 50 1 %      MCV 87 fL      MCH 30 4 pg      MCHC 34 9 g/dL      RDW 11 3 %      MPV 9 6 fL      Platelets 933 Thousands/uL      nRBC 0 /100 WBCs      Neutrophils Relative 55 %      Immat GRANS % 0 %      Lymphocytes Relative 32 %      Monocytes Relative 8 %      Eosinophils Relative 4 %      Basophils Relative 1 %      Neutrophils Absolute 5 80 Thousands/µL      Immature Grans Absolute 0 03 Thousand/uL      Lymphocytes Absolute 3 36 Thousands/µL      Monocytes Absolute 0 81 Thousand/µL      Eosinophils Absolute 0 42 Thousand/µL      Basophils Absolute 0 07 Thousands/µL                  CT abdomen pelvis with contrast   Final Result by Trenton Weaver DO (12/07 1621)   No CT explanation for patient's symptoms                 Workstation performed: [id number]                    Procedures  Procedures         ED Course  ED Course as of 12/09/22 2014   Wed Dec 07, 2022   1535 Glucose, Random(!): 461  Patient states that his sugars are typically in the 200s  Has been on insulin in the past but was taken off in 2020 due to insurance reasons  Is only on metformin for his diabetes  1639 BS: 0                                             MDM  Number of Diagnoses or Management Options  Hyperglycemia: new and requires workup  Diagnosis management comments: 61-year-old male with history of diabetes, hypertension, hyperlipidemia presents to the emergency department for evaluation of abdominal pain x2 days  Patient is overall nontoxic-appearing, no acute distress  Abdominal exam relatively benign  CBC, CMP, lipase already completed prior to my evaluation due to first nurse protocol  Elevated blood sugar noted  Ordered VBG and beta hydroxybutyrate to evaluate for possible DKA  Started IV fluids  Urine clean  No elevated white count  Corrected sodium 136  CT showing no acute intra-abdominal pathology  VBG and beta hydroxybutyrate stable  Will give insulin in the emergency department and plan for adjustment of home medications to help with his blood sugars  Discussed the results and plan with the patient who verbalizes understanding  Plan for discharge with outpatient follow-up with PCP for further monitoring and management of diabetes  Recommended that he increase his dose of glimepiride to 4 milligrams daily  Patient amenable to discharge  Strict return precautions discussed  Patient stable at time of discharge         Amount and/or Complexity of Data Reviewed  Clinical lab tests: ordered and reviewed  Tests in the radiology section of CPT®: ordered and reviewed  Review and summarize past medical records: yes  Independent visualization of images, tracings, or specimens: yes    Patient Progress  Patient progress: stable      Disposition  Final diagnoses:   Hyperglycemia     Time reflects when diagnosis was documented in both MDM as applicable and the Disposition within this note     Time User Action Codes Description Comment    12/7/2022 6:18 PM Polo Clink Add [R73 9] Hyperglycemia       ED Disposition     ED Disposition   Discharge    Condition   Stable    Date/Time   Wed Dec 7, 2022  6:32 PM    Comment   [facility]  discharge to home/self care  Follow-up Information     Follow up With Specialties Details Why Contact Info Additional Information    Nataliia Galdamez MD Family Medicine Call in 3 days follow up for further evaluation of symptoms [address] [facility] [address]       [facility] Emergency Department Emergency Medicine Go to  If symptoms worsen [address] [place] [id number]  [phone] [facility] Emergency Department, [address] [place], [place], [address]          Discharge Medication List as of 12/7/2022  6:34 PM      CONTINUE these medications which have NOT CHANGED    Details   ergocalciferol (VITAMIN D2) 50,000 units Take 1 capsule (50,000 Units total) by mouth once a week, Starting Tue 8/24/2021, Normal      glimepiride (AMARYL) 2 mg tablet Take 1 tablet (2 mg total) by mouth daily with breakfast, Starting Mon 12/6/2021, Normal      lisinopril (ZESTRIL) 2 5 mg tablet TAKE 1 TABLET BY MOUTH EVERY DAY, Normal      metFORMIN (GLUCOPHAGE) 850 mg tablet TAKE 1 TABLET BY MOUTH 3 TIMES A DAY , Normal      pravastatin (PRAVACHOL) 20 mg tablet TAKE 1 TABLET BY MOUTH EVERY DAY, Normal      sildenafil (VIAGRA) 50 MG tablet Take 1 tablet (50 mg total) by mouth daily as needed for erectile dysfunction, Starting Tue 8/24/2021, Normal             No discharge procedures on file      PDMP Review     None          ED Provider  Electronically Signed by           Carole Montoya PA-C  12/09/22 2014

## 2022-12-12 DIAGNOSIS — E11.65 TYPE 2 DIABETES MELLITUS WITH HYPERGLYCEMIA, UNSPECIFIED WHETHER LONG TERM INSULIN USE (HCC): ICD-10-CM

## 2022-12-13 RX ORDER — LISINOPRIL 2.5 MG/1
2.5 TABLET ORAL DAILY
Qty: 90 TABLET | Refills: 1 | Status: SHIPPED | OUTPATIENT
Start: 2022-12-13

## 2022-12-20 DIAGNOSIS — E78.2 MIXED HYPERLIPIDEMIA: ICD-10-CM

## 2022-12-20 DIAGNOSIS — E11.65 TYPE 2 DIABETES MELLITUS WITH HYPERGLYCEMIA, WITH LONG-TERM CURRENT USE OF INSULIN (HCC): ICD-10-CM

## 2022-12-20 DIAGNOSIS — M15.9 PRIMARY OSTEOARTHRITIS INVOLVING MULTIPLE JOINTS: ICD-10-CM

## 2022-12-20 DIAGNOSIS — Z79.4 TYPE 2 DIABETES MELLITUS WITH HYPERGLYCEMIA, WITH LONG-TERM CURRENT USE OF INSULIN (HCC): ICD-10-CM

## 2022-12-20 DIAGNOSIS — D72.829 LEUKOCYTOSIS, UNSPECIFIED TYPE: ICD-10-CM

## 2022-12-20 DIAGNOSIS — Z12.5 SCREENING FOR PROSTATE CANCER: ICD-10-CM

## 2022-12-20 DIAGNOSIS — E55.9 VITAMIN D DEFICIENCY: Primary | ICD-10-CM

## 2022-12-27 ENCOUNTER — RA CDI HCC (OUTPATIENT)
Dept: OTHER | Facility: HOSPITAL | Age: 67
End: 2022-12-27

## 2022-12-27 NOTE — PROGRESS NOTES
[facility] [address]  coding opportunities       Chart reviewed, no opportunity found: CHART REVIEWED, NO OPPORTUNITY FOUND        Patients Insurance     Medicare Insurance: Medicare

## 2022-12-29 ENCOUNTER — APPOINTMENT (OUTPATIENT)
Dept: LAB | Facility: CLINIC | Age: 67
End: 2022-12-29

## 2022-12-29 DIAGNOSIS — E55.9 VITAMIN D DEFICIENCY: ICD-10-CM

## 2022-12-29 DIAGNOSIS — E11.65 TYPE 2 DIABETES MELLITUS WITH HYPERGLYCEMIA, WITH LONG-TERM CURRENT USE OF INSULIN (HCC): ICD-10-CM

## 2022-12-29 DIAGNOSIS — Z79.4 TYPE 2 DIABETES MELLITUS WITH HYPERGLYCEMIA, WITH LONG-TERM CURRENT USE OF INSULIN (HCC): ICD-10-CM

## 2022-12-29 DIAGNOSIS — Z12.5 SCREENING FOR PROSTATE CANCER: ICD-10-CM

## 2022-12-29 DIAGNOSIS — E78.2 MIXED HYPERLIPIDEMIA: ICD-10-CM

## 2022-12-29 DIAGNOSIS — D72.829 LEUKOCYTOSIS, UNSPECIFIED TYPE: ICD-10-CM

## 2022-12-29 LAB
25(OH)D3 SERPL-MCNC: 12.9 NG/ML (ref 30–100)
ALBUMIN SERPL BCP-MCNC: 3.5 G/DL (ref 3.5–5)
ALP SERPL-CCNC: 60 U/L (ref 46–116)
ALT SERPL W P-5'-P-CCNC: 23 U/L (ref 12–78)
ANION GAP SERPL CALCULATED.3IONS-SCNC: 8 MMOL/L (ref 4–13)
AST SERPL W P-5'-P-CCNC: 17 U/L (ref 5–45)
BASOPHILS # BLD AUTO: 0.07 THOUSANDS/ÂΜL (ref 0–0.1)
BASOPHILS NFR BLD AUTO: 1 % (ref 0–1)
BILIRUB SERPL-MCNC: 1.58 MG/DL (ref 0.2–1)
BUN SERPL-MCNC: 12 MG/DL (ref 5–25)
CALCIUM SERPL-MCNC: 8.8 MG/DL (ref 8.3–10.1)
CHLORIDE SERPL-SCNC: 102 MMOL/L (ref 96–108)
CHOLEST SERPL-MCNC: 163 MG/DL
CO2 SERPL-SCNC: 26 MMOL/L (ref 21–32)
CREAT SERPL-MCNC: 0.78 MG/DL (ref 0.6–1.3)
EOSINOPHIL # BLD AUTO: 0.32 THOUSAND/ÂΜL (ref 0–0.61)
EOSINOPHIL NFR BLD AUTO: 5 % (ref 0–6)
ERYTHROCYTE [DISTWIDTH] IN BLOOD BY AUTOMATED COUNT: 11.5 % (ref 11.6–15.1)
EST. AVERAGE GLUCOSE BLD GHB EST-MCNC: 252 MG/DL
GFR SERPL CREATININE-BSD FRML MDRD: 94 ML/MIN/1.73SQ M
GLUCOSE P FAST SERPL-MCNC: 280 MG/DL (ref 65–99)
HBA1C MFR BLD: 10.4 %
HCT VFR BLD AUTO: 47.5 % (ref 36.5–49.3)
HDLC SERPL-MCNC: 45 MG/DL
HGB BLD-MCNC: 16.2 G/DL (ref 12–17)
IMM GRANULOCYTES # BLD AUTO: 0.01 THOUSAND/UL (ref 0–0.2)
IMM GRANULOCYTES NFR BLD AUTO: 0 % (ref 0–2)
LDLC SERPL CALC-MCNC: 80 MG/DL (ref 0–100)
LYMPHOCYTES # BLD AUTO: 2.63 THOUSANDS/ÂΜL (ref 0.6–4.47)
LYMPHOCYTES NFR BLD AUTO: 37 % (ref 14–44)
MCH RBC QN AUTO: 29.6 PG (ref 26.8–34.3)
MCHC RBC AUTO-ENTMCNC: 34.1 G/DL (ref 31.4–37.4)
MCV RBC AUTO: 87 FL (ref 82–98)
MONOCYTES # BLD AUTO: 0.55 THOUSAND/ÂΜL (ref 0.17–1.22)
MONOCYTES NFR BLD AUTO: 8 % (ref 4–12)
NEUTROPHILS # BLD AUTO: 3.46 THOUSANDS/ÂΜL (ref 1.85–7.62)
NEUTS SEG NFR BLD AUTO: 49 % (ref 43–75)
NONHDLC SERPL-MCNC: 118 MG/DL
NRBC BLD AUTO-RTO: 0 /100 WBCS
PLATELET # BLD AUTO: 247 THOUSANDS/UL (ref 149–390)
PMV BLD AUTO: 9.9 FL (ref 8.9–12.7)
POTASSIUM SERPL-SCNC: 4.2 MMOL/L (ref 3.5–5.3)
PROT SERPL-MCNC: 7.2 G/DL (ref 6.4–8.4)
PSA SERPL-MCNC: 0.6 NG/ML (ref 0–4)
RBC # BLD AUTO: 5.48 MILLION/UL (ref 3.88–5.62)
SODIUM SERPL-SCNC: 136 MMOL/L (ref 135–147)
TRIGL SERPL-MCNC: 191 MG/DL
TSH SERPL DL<=0.05 MIU/L-ACNC: 0.75 UIU/ML (ref 0.45–4.5)
WBC # BLD AUTO: 7.04 THOUSAND/UL (ref 4.31–10.16)

## 2023-01-05 ENCOUNTER — OFFICE VISIT (OUTPATIENT)
Dept: INTERNAL MEDICINE CLINIC | Facility: CLINIC | Age: 68
End: 2023-01-05

## 2023-01-05 VITALS
BODY MASS INDEX: 24.96 KG/M2 | HEIGHT: 68 IN | SYSTOLIC BLOOD PRESSURE: 144 MMHG | DIASTOLIC BLOOD PRESSURE: 86 MMHG | HEART RATE: 92 BPM | WEIGHT: 164.7 LBS | TEMPERATURE: 97.7 F | OXYGEN SATURATION: 98 %

## 2023-01-05 DIAGNOSIS — Z23 NEED FOR VACCINATION AGAINST STREPTOCOCCUS PNEUMONIAE: ICD-10-CM

## 2023-01-05 DIAGNOSIS — E11.65 TYPE 2 DIABETES MELLITUS WITH HYPERGLYCEMIA, WITH LONG-TERM CURRENT USE OF INSULIN (HCC): Primary | ICD-10-CM

## 2023-01-05 DIAGNOSIS — E55.9 VITAMIN D DEFICIENCY: ICD-10-CM

## 2023-01-05 DIAGNOSIS — E78.2 MIXED HYPERLIPIDEMIA: ICD-10-CM

## 2023-01-05 DIAGNOSIS — Z12.11 SCREENING FOR COLON CANCER: ICD-10-CM

## 2023-01-05 DIAGNOSIS — Z00.00 WELCOME TO MEDICARE PREVENTIVE VISIT: ICD-10-CM

## 2023-01-05 DIAGNOSIS — N52.9 ERECTILE DYSFUNCTION, UNSPECIFIED ERECTILE DYSFUNCTION TYPE: ICD-10-CM

## 2023-01-05 DIAGNOSIS — Z79.4 TYPE 2 DIABETES MELLITUS WITH HYPERGLYCEMIA, WITH LONG-TERM CURRENT USE OF INSULIN (HCC): Primary | ICD-10-CM

## 2023-01-05 RX ORDER — ERGOCALCIFEROL 1.25 MG/1
50000 CAPSULE ORAL WEEKLY
Qty: 12 CAPSULE | Refills: 1 | Status: SHIPPED | OUTPATIENT
Start: 2023-01-05

## 2023-01-05 RX ORDER — SILDENAFIL 100 MG/1
100 TABLET, FILM COATED ORAL DAILY PRN
Qty: 10 TABLET | Refills: 3 | Status: SHIPPED | OUTPATIENT
Start: 2023-01-05

## 2023-01-05 NOTE — PROGRESS NOTES
Assessment and Plan:     Problem List Items Addressed This Visit        Endocrine    Type 2 diabetes mellitus with hyperglycemia ([facility] [address] ) - Primary    Relevant Medications    canagliflozin (Invokana) 100 mg    Other Relevant Orders    Microalbumin / creatinine urine ratio    Ambulatory Referral to Ophthalmology    CBC and differential    Comprehensive metabolic panel    Hemoglobin A1C       Other    Hyperlipidemia    Relevant Orders    CBC and differential    Comprehensive metabolic panel    Lipid panel    TSH, 3rd generation    Vitamin D deficiency    Relevant Medications    ergocalciferol (VITAMIN D2) 50,000 units    Other Relevant Orders    CBC and differential    Vitamin D 25 hydroxy    Erectile dysfunction    Relevant Medications    sildenafil (VIAGRA) 100 mg tablet    Other Relevant Orders    CBC and differential   Other Visit Diagnoses     Screening for colon cancer        Relevant Orders    Ambulatory Referral to Gastroenterology    Need for vaccination against Streptococcus pneumoniae        Relevant Orders    PNEUMOCOCCAL POLYSACCHARIDE VACCINE 23-VALENT =>3YO SQ IM (Completed)    Welcome to Medicare preventive visit            Orders and recommendations as noted above  Recent laboratory testing reviewed with him  Hemoglobin A1c remains significantly elevated but somewhat improved  Did only recently restart his medications regularly  Continue with the Amaryl and metformin  We will add Invokana  Advised him that if this is cost prohibitive, other options are available  Discussed possibly medication such as Ozempic in the future but wishes to avoid injectable medications if possible  Recommended follow-up with ophthalmology  Check feet daily  Foot exam completed with some decreased sensation noted  Consider following up with podiatry  Continue with lisinopril as previously  Watch salt intake  Importance of staying adequately hydrated stressed  Continue the pravastatin as previously  Watch diet  Increase fiber in diet  Erectile dysfunction discussed  Discussed better blood sugar control  Recent PSA discussed  Continue with the Viagra but dosage increased  Consider follow-up with urology if symptoms persist or worsen  Declines flu shot  COVID booster discussed  Pneumonia vaccine given today  We will have him follow-up in about 3 to 5 months with laboratory testing prior to that visit  Follow-up sooner if needed  BMI Counseling: Body mass index is 25 04 kg/m²  The BMI is above normal  Nutrition recommendations include encouraging healthy choices of fruits and vegetables, decreasing fast food intake, consuming healthier snacks, limiting drinks that contain sugar, moderation in carbohydrate intake and reducing intake of cholesterol  Exercise recommendations include exercising 3-5 times per week  Rationale for BMI follow-up plan is due to patient being overweight or obese  Depression Screening and Follow-up Plan: Patient was screened for depression during today's encounter  They screened negative with a PHQ-2 score of 0  Preventive health issues were discussed with patient, and age appropriate screening tests were ordered as noted in patient's After Visit Summary  Personalized health advice and appropriate referrals for health education or preventive services given if needed, as noted in patient's After Visit Summary  History of Present Illness:     Patient presents for a Medicare Wellness Visit    He presents for routine follow-up as well as welcome to Medicare  He had been without insurance and had been signed about potential costs for his medications as well as laboratory testing  He is now on Medicare  He did recently start back on his medications regularly over the past month or so  Was seen in the emergency room and diagnosed with significant hyperglycemia  He has had increasing issues with numbness, tingling, and pain into his hands and feet    Left foot is the greatest with the previous injury to that foot  He has had issues with obtaining and maintaining an erection  Has been taking the Viagra but this has not been overly effective  Had the dose of the glimepiride increased at the emergency room to 4 mg daily  Blood sugars had improved somewhat  Blood sugars still remain on average in the 280s  Has been more tired  Continues with the metformin  Does have some GI upset with this  Had lost weight  Feeling more tired  Has some vision changes but has not followed up with ophthalmology regularly  He does plan on scheduling this  Tolerating lisinopril without difficulty  Denies any headaches or localized weakness  Continues with the pravastatin  Denies any muscle aches or weakness with this  Patient Care Team:  Jacinda Fragoso MD as PCP - General (Family Medicine)  Amrit Griffith MD     Review of Systems:     Review of Systems   Constitutional: Positive for fatigue  Negative for activity change, appetite change, chills and fever  HENT: Negative for hearing loss  Eyes: Negative for pain and visual disturbance  Respiratory: Negative for cough, chest tightness and shortness of breath  Cardiovascular: Negative for chest pain and palpitations  Gastrointestinal: Negative for abdominal pain, blood in stool, diarrhea, nausea and vomiting  Endocrine: Negative for polydipsia, polyphagia and polyuria  As per HPI   Genitourinary: Negative for dysuria  Musculoskeletal: Positive for arthralgias and myalgias  Negative for gait problem  Skin: Negative for color change  Neurological: Positive for numbness  Negative for dizziness and headaches  Hematological: Does not bruise/bleed easily  Psychiatric/Behavioral: Negative for behavioral problems  The patient is not nervous/anxious           Problem List:     Patient Active Problem List   Diagnosis   • Type 2 diabetes mellitus with hyperglycemia ([facility] [address] )   • Hyperlipidemia   • Hyponatremia   • Vitamin D deficiency   • Muscle weakness   • Erectile dysfunction   • Primary osteoarthritis involving multiple joints   • Positive colorectal cancer screening using Cologuard test   • Chronic sinusitis   • Leukocytosis   • Allergic rhinitis   • Weight loss      Past Medical and Surgical History:     Past Medical History:   Diagnosis Date   • Diabetes mellitus ([facility] [address] )    • Hyperlipidemia    • Hypertension    • Pancreatitis    • Pancreatitis    • Tobacco abuse 6/14/2016     Past Surgical History:   Procedure Laterality Date   • ANKLE SURGERY     • INCISION AND DRAINAGE OF WOUND Right 6/15/2016    Procedure: INCISION AND DRAINAGE (I&D) EXTREMITY;  Surgeon: Martina Juarez MD;  Location: [facility] OR;  Service:       Family History:     Family History   Problem Relation Age of Onset   • Diabetes Mother    • Dementia Father       Social History:     Social History     Socioeconomic History   • Marital status: /Civil Union     Spouse name: None   • Number of children: None   • Years of education: None   • Highest education level: None   Occupational History   • None   Tobacco Use   • Smoking status: Former     Packs/day: 2 00     Years: 20 00     Pack years: 40 00     Types: Cigarettes   • Smokeless tobacco: Never   Vaping Use   • Vaping Use: Never used   Substance and Sexual Activity   • Alcohol use: No   • Drug use: No   • Sexual activity: Yes     Partners: Female   Other Topics Concern   • None   Social History Narrative   • None     Social Determinants of Health     Financial Resource Strain: Low Risk    • Difficulty of Paying Living Expenses: Not hard at all   Food Insecurity: Not on file   Transportation Needs: No Transportation Needs   • Lack of Transportation (Medical): No   • Lack of Transportation (Non-Medical):  No   Physical Activity: Not on file   Stress: Not on file   Social Connections: Not on file   Intimate Partner Violence: Not on file   Housing Stability: Not on file      Medications and Allergies:     Current Outpatient Medications   Medication Sig Dispense Refill   • canagliflozin (Invokana) 100 mg Take 1 tablet (100 mg total) by mouth daily before breakfast 90 tablet 1   • ergocalciferol (VITAMIN D2) 50,000 units Take 1 capsule (50,000 Units total) by mouth once a week 12 capsule 1   • glimepiride (AMARYL) 2 mg tablet TAKE 1 TABLET BY MOUTH DAILY WITH BREAKFAST 90 tablet 0   • lisinopril (ZESTRIL) 2 5 mg tablet Take 1 tablet (2 5 mg total) by mouth daily 90 tablet 1   • metFORMIN (GLUCOPHAGE) 850 mg tablet TAKE 1 TABLET BY MOUTH 3 TIMES A DAY  270 tablet 1   • pravastatin (PRAVACHOL) 20 mg tablet TAKE 1 TABLET BY MOUTH EVERY DAY 90 tablet 1   • sildenafil (VIAGRA) 100 mg tablet Take 1 tablet (100 mg total) by mouth daily as needed for erectile dysfunction 10 tablet 3     No current facility-administered medications for this visit  Allergies   Allergen Reactions   • Penicillins       Immunizations:     Immunization History   Administered Date(s) Administered   • COVID-19 MODERNA VACC 0 5 ML IM 05/16/2021, 06/12/2021   • Influenza, recombinant, quadrivalent,injectable, preservative free 12/10/2019   • Pneumococcal Conjugate 13-Valent 04/02/2019   • Pneumococcal Polysaccharide PPV23 01/05/2023   • Tdap 06/12/2016, 06/12/2016      Health Maintenance:         Topic Date Due   • Colorectal Cancer Screening  08/20/2020   • HIV Screening  Completed   • Hepatitis C Screening  Completed         Topic Date Due   • Hepatitis B Vaccine (1 of 3 - 3-dose series) Never done   • COVID-19 Vaccine (3 - Booster for Moderna series) 08/07/2021   • Influenza Vaccine (1) 09/01/2022      Medicare Screening Tests and Risk Assessments:     Scott Tarango is here for his Welcome to Medicare visit  Health Risk Assessment:   Patient rates overall health as good  Patient feels that their physical health rating is slightly worse  Patient is satisfied with their life  Eyesight was rated as slightly worse  Hearing was rated as same   Patient feels that their emotional and mental health rating is same  Patients states they are never, rarely angry  Patient states they are sometimes unusually tired/fatigued  Pain experienced in the last 7 days has been some  Patient's pain rating has been 5/10  Patient states that he has experienced no weight loss or gain in last 6 months  Depression Screening:   PHQ-2 Score: 0      Fall Risk Screening: In the past year, patient has experienced: no history of falling in past year      Home Safety:  Patient does not have trouble with stairs inside or outside of their home  Patient has working smoke alarms and has working carbon monoxide detector  Home safety hazards include: none  Nutrition:   Current diet is Regular  Medications:   Patient is currently taking over-the-counter supplements  OTC medications include: see medication list  Patient is able to manage medications  Activities of Daily Living (ADLs)/Instrumental Activities of Daily Living (IADLs):   Walk and transfer into and out of bed and chair?: Yes  Dress and groom yourself?: Yes    Bathe or shower yourself?: Yes    Feed yourself? Yes  Do your laundry/housekeeping?: Yes  Manage your money, pay your bills and track your expenses?: Yes  Make your own meals?: Yes    Do your own shopping?: Yes    Previous Hospitalizations:   Any hospitalizations or ED visits within the last 12 months?: No      Advance Care Planning:   Living will: Yes    Durable POA for healthcare:  Yes    Advanced directive: Yes    Provider agrees with end of life decisions: Yes      Cognitive Screening:   Provider or family/friend/caregiver concerned regarding cognition?: No    PREVENTIVE SCREENINGS      Cardiovascular Screening:    General: Screening Not Indicated and History Lipid Disorder      Diabetes Screening:     General: Screening Not Indicated and History Diabetes      Colorectal Cancer Screening:     General: Screening Current      Prostate Cancer Screening:    General: Screening Current      Osteoporosis Screening:    General: Screening Not Indicated      Abdominal Aortic Aneurysm (AAA) Screening:    Risk factors include: age between 73-67 yo and tobacco use        General: Patient Declines      Hepatitis C Screening:    General: Screening Current    Screening, Brief Intervention, and Referral to Treatment (SBIRT)    Screening  Typical number of drinks in a day: 0  Typical number of drinks in a week: 0  Interpretation: Low risk drinking behavior  Single Item Drug Screening:  How often have you used an illegal drug (including marijuana) or a prescription medication for non-medical reasons in the past year? never    Single Item Drug Screen Score: 0  Interpretation: Negative screen for possible drug use disorder    Brief Intervention  Alcohol & drug use screenings were reviewed  No concerns regarding substance use disorder identified  Vision Screening    Right eye Left eye Both eyes   Without correction 20/30 20/15 20/15   With correction           Physical Exam:     /86 (BP Location: Left arm, Patient Position: Sitting, Cuff Size: Standard)   Pulse 92   Temp 97 7 °F (36 5 °C)   Ht 5' 8" (1 727 m)   Wt 74 7 kg (164 lb 11 2 oz)   SpO2 98%   BMI 25 04 kg/m²     Physical Exam  Vitals and nursing note reviewed  Constitutional:       General: He is not in acute distress  Appearance: He is well-developed and well-groomed  HENT:      Head: Normocephalic and atraumatic  Eyes:      Conjunctiva/sclera: Conjunctivae normal    Cardiovascular:      Rate and Rhythm: Normal rate and regular rhythm  Pulses: no weak pulses          Dorsalis pedis pulses are 1+ on the right side and 1+ on the left side  Posterior tibial pulses are 1+ on the right side and 1+ on the left side  Heart sounds: No murmur heard  Pulmonary:      Effort: Pulmonary effort is normal  No respiratory distress  Breath sounds: Decreased breath sounds present  No wheezing or rhonchi  Abdominal:      Palpations: Abdomen is soft  Tenderness: There is no abdominal tenderness  Musculoskeletal:         General: No swelling  Cervical back: Neck supple  Feet:      Right foot:      Skin integrity: Callus present  No ulcer, skin breakdown, erythema, warmth or dry skin  Left foot:      Skin integrity: Callus present  No ulcer, skin breakdown, erythema, warmth or dry skin  Skin:     General: Skin is warm and dry  Capillary Refill: Capillary refill takes less than 2 seconds  Neurological:      Mental Status: He is alert  Psychiatric:         Mood and Affect: Mood and affect normal          Speech: Speech normal          Behavior: Behavior is cooperative  Cognition and Memory: Cognition and memory normal      Diabetic Foot Exam    Patient's shoes and socks removed  Right Foot/Ankle   Right Foot Inspection  Skin Exam: skin normal, skin intact, callus and callus  No dry skin, no warmth, no erythema, no maceration, no abnormal color, no pre-ulcer and no ulcer  Toe Exam: ROM and strength within normal limits  Sensory   Monofilament testing: diminished    Vascular  Capillary refills: < 3 seconds  The right DP pulse is 1+  The right PT pulse is 1+  Left Foot/Ankle  Left Foot Inspection  Skin Exam: skin normal, skin intact and callus  No dry skin, no warmth, no erythema, no maceration, normal color, no pre-ulcer and no ulcer  Toe Exam: ROM and strength within normal limits  Sensory   Monofilament testing: diminished    Vascular  Capillary refills: < 3 seconds  The left DP pulse is 1+  The left PT pulse is 1+       Assign Risk Category  Deformity present  Loss of protective sensation  No weak pulses  Risk: 2         Bar Greenfield MD

## 2023-01-05 NOTE — PATIENT INSTRUCTIONS
Medicare Preventive Visit Patient Instructions  Thank you for completing your Welcome to Medicare Visit or Medicare Annual Wellness Visit today  Your next wellness visit will be due in one year (1/6/2024)  The screening/preventive services that you may require over the next 5-10 years are detailed below  Some tests may not apply to you based off risk factors and/or age  Screening tests ordered at today's visit but not completed yet may show as past due  Also, please note that scanned in results may not display below  Preventive Screenings:  Service Recommendations Previous Testing/Comments   Colorectal Cancer Screening  · Colonoscopy    · Fecal Occult Blood Test (FOBT)/Fecal Immunochemical Test (FIT)  · Fecal DNA/Cologuard Test  · Flexible Sigmoidoscopy Age: 39-70 years old   Colonoscopy: every 10 years (May be performed more frequently if at higher risk)  OR  FOBT/FIT: every 1 year  OR  Cologuard: every 3 years  OR  Sigmoidoscopy: every 5 years  Screening may be recommended earlier than age 39 if at higher risk for colorectal cancer  Also, an individualized decision between you and your healthcare provider will decide whether screening between the ages of 74-80 would be appropriate   Colonoscopy: 08/20/2019  FOBT/FIT: Not on file  Cologuard: Not on file  Sigmoidoscopy: Not on file    Screening Current     Prostate Cancer Screening Individualized decision between patient and health care provider in men between ages of 53-78   Medicare will cover every 12 months beginning on the day after your 50th birthday PSA: 0 6 ng/mL     Screening Current     Hepatitis C Screening Once for adults born between 1945 and 1965  More frequently in patients at high risk for Hepatitis C Hep C Antibody: 06/16/2016    Screening Current   Diabetes Screening 1-2 times per year if you're at risk for diabetes or have pre-diabetes Fasting glucose: 280 mg/dL (12/29/2022)  A1C: 10 4 % (12/29/2022)  Screening Not Indicated  History Diabetes Cholesterol Screening Once every 5 years if you don't have a lipid disorder  May order more often based on risk factors  Lipid panel: 12/29/2022  Screening Not Indicated  History Lipid Disorder      Other Preventive Screenings Covered by Medicare:  1  Abdominal Aortic Aneurysm (AAA) Screening: covered once if your at risk  You're considered to be at risk if you have a family history of AAA or a male between the age of 73-68 who smoking at least 100 cigarettes in your lifetime  2  Lung Cancer Screening: covers low dose CT scan once per year if you meet all of the following conditions: (1) Age 50-69; (2) No signs or symptoms of lung cancer; (3) Current smoker or have quit smoking within the last 15 years; (4) You have a tobacco smoking history of at least 20 pack years (packs per day x number of years you smoked); (5) You get a written order from a healthcare provider  3  Glaucoma Screening: covered annually if you're considered high risk: (1) You have diabetes OR (2) Family history of glaucoma OR (3)  aged 48 and older OR (3)  American aged 72 and older  3  Osteoporosis Screening: covered every 2 years if you meet one of the following conditions: (1) Have a vertebral abnormality; (2) On glucocorticoid therapy for more than 3 months; (3) Have primary hyperparathyroidism; (4) On osteoporosis medications and need to assess response to drug therapy  5  HIV Screening: covered annually if you're between the age of 12-76  Also covered annually if you are younger than 13 and older than 72 with risk factors for HIV infection  For pregnant patients, it is covered up to 3 times per pregnancy      Immunizations:  Immunization Recommendations   Influenza Vaccine Annual influenza vaccination during flu season is recommended for all persons aged >= 6 months who do not have contraindications   Pneumococcal Vaccine   * Pneumococcal conjugate vaccine = PCV13 (Prevnar 13), PCV15 (Vaxneuvance), PCV20 (Prevnar 20)  * Pneumococcal polysaccharide vaccine = PPSV23 (Pneumovax) Adults 2364 years old: 1-3 doses may be recommended based on certain risk factors  Adults 72 years old: 1-2 doses may be recommended based off what pneumonia vaccine you previously received   Hepatitis B Vaccine 3 dose series if at intermediate or high risk (ex: diabetes, end stage renal disease, liver disease)   Tetanus (Td) Vaccine - COST NOT COVERED BY MEDICARE PART B Following completion of primary series, a booster dose should be given every 10 years to maintain immunity against tetanus  Td may also be given as tetanus wound prophylaxis  Tdap Vaccine - COST NOT COVERED BY MEDICARE PART B Recommended at least once for all adults  For pregnant patients, recommended with each pregnancy  Shingles Vaccine (Shingrix) - COST NOT COVERED BY MEDICARE PART B  2 shot series recommended in those aged 48 and above     Health Maintenance Due:      Topic Date Due   • Colorectal Cancer Screening  08/20/2020   • HIV Screening  Completed   • Hepatitis C Screening  Completed     Immunizations Due:      Topic Date Due   • Hepatitis B Vaccine (1 of 3 - 3-dose series) Never done   • Pneumococcal Vaccine: 65+ Years (2 - PPSV23 if available, else PCV20) 04/02/2020   • COVID-19 Vaccine (3 - Booster for Moderna series) 08/07/2021   • Influenza Vaccine (1) 09/01/2022     Advance Directives   What are advance directives? Advance directives are legal documents that state your wishes and plans for medical care  These plans are made ahead of time in case you lose your ability to make decisions for yourself  Advance directives can apply to any medical decision, such as the treatments you want, and if you want to donate organs  What are the types of advance directives? There are many types of advance directives, and each state has rules about how to use them  You may choose a combination of any of the following:  · Living will:   This is a written record of the treatment you want  You can also choose which treatments you do not want, which to limit, and which to stop at a certain time  This includes surgery, medicine, IV fluid, and tube feedings  · Durable power of  for healthcare [facility]): This is a written record that states who you want to make healthcare choices for you when you are unable to make them for yourself  This person, called a proxy, is usually a family member or a friend  You may choose more than 1 proxy  · Do not resuscitate (DNR) order:  A DNR order is used in case your heart stops beating or you stop breathing  It is a request not to have certain forms of treatment, such as CPR  A DNR order may be included in other types of advance directives  · Medical directive: This covers the care that you want if you are in a coma, near death, or unable to make decisions for yourself  You can list the treatments you want for each condition  Treatment may include pain medicine, surgery, blood transfusions, dialysis, IV or tube feedings, and a ventilator (breathing machine)  · Values history: This document has questions about your views, beliefs, and how you feel and think about life  This information can help others choose the care that you would choose  Why are advance directives important? An advance directive helps you control your care  Although spoken wishes may be used, it is better to have your wishes written down  Spoken wishes can be misunderstood, or not followed  Treatments may be given even if you do not want them  An advance directive may make it easier for your family to make difficult choices about your care  Weight Management   Why it is important to manage your weight:  Being overweight increases your risk of health conditions such as heart disease, high blood pressure, type 2 diabetes, and certain types of cancer  It can also increase your risk for osteoarthritis, sleep apnea, and other respiratory problems   Aim for a slow, steady weight loss  Even a small amount of weight loss can lower your risk of health problems  How to lose weight safely:  A safe and healthy way to lose weight is to eat fewer calories and get regular exercise  You can lose up about 1 pound a week by decreasing the number of calories you eat by 500 calories each day  Healthy meal plan for weight management:  A healthy meal plan includes a variety of foods, contains fewer calories, and helps you stay healthy  A healthy meal plan includes the following:  · Eat whole-grain foods more often  A healthy meal plan should contain fiber  Fiber is the part of grains, fruits, and vegetables that is not broken down by your body  Whole-grain foods are healthy and provide extra fiber in your diet  Some examples of whole-grain foods are whole-wheat breads and pastas, oatmeal, brown rice, and bulgur  · Eat a variety of vegetables every day  Include dark, leafy greens such as spinach, kale, jose antonio greens, and mustard greens  Eat yellow and orange vegetables such as carrots, sweet potatoes, and winter squash  · Eat a variety of fruits every day  Choose fresh or canned fruit (canned in its own juice or light syrup) instead of juice  Fruit juice has very little or no fiber  · Eat low-fat dairy foods  Drink fat-free (skim) milk or 1% milk  Eat fat-free yogurt and low-fat cottage cheese  Try low-fat cheeses such as mozzarella and other reduced-fat cheeses  · Choose meat and other protein foods that are low in fat  Choose beans or other legumes such as split peas or lentils  Choose fish, skinless poultry (chicken or turkey), or lean cuts of red meat (beef or pork)  Before you cook meat or poultry, cut off any visible fat  · Use less fat and oil  Try baking foods instead of frying them  Add less fat, such as margarine, sour cream, regular salad dressing and mayonnaise to foods  Eat fewer high-fat foods   Some examples of high-fat foods include french fries, doughnuts, ice cream, and cakes   · Eat fewer sweets  Limit foods and drinks that are high in sugar  This includes candy, cookies, regular soda, and sweetened drinks  Exercise:  Exercise at least 30 minutes per day on most days of the week  Some examples of exercise include walking, biking, dancing, and swimming  You can also fit in more physical activity by taking the stairs instead of the elevator or parking farther away from stores  Ask your healthcare provider about the best exercise plan for you  © Copyright [employer] 2018 Information is for End User's use only and may not be sold, redistributed or otherwise used for commercial purposes   All illustrations and images included in CareNotes® are the copyrighted property of [employer]  or [address] [place] [address]

## 2023-03-15 DIAGNOSIS — Z79.4 TYPE 2 DIABETES MELLITUS WITH HYPERGLYCEMIA, WITH LONG-TERM CURRENT USE OF INSULIN (HCC): ICD-10-CM

## 2023-03-15 DIAGNOSIS — E11.65 TYPE 2 DIABETES MELLITUS WITH HYPERGLYCEMIA, WITH LONG-TERM CURRENT USE OF INSULIN (HCC): ICD-10-CM

## 2023-03-15 RX ORDER — GLIMEPIRIDE 2 MG/1
TABLET ORAL
Qty: 90 TABLET | Refills: 0 | Status: SHIPPED | OUTPATIENT
Start: 2023-03-15

## 2023-03-16 DIAGNOSIS — E11.65 TYPE 2 DIABETES MELLITUS WITH HYPERGLYCEMIA, UNSPECIFIED WHETHER LONG TERM INSULIN USE (HCC): ICD-10-CM

## 2023-03-16 RX ORDER — LISINOPRIL 2.5 MG/1
TABLET ORAL
Qty: 90 TABLET | Refills: 1 | Status: SHIPPED | OUTPATIENT
Start: 2023-03-16

## 2023-04-25 ENCOUNTER — APPOINTMENT (OUTPATIENT)
Dept: LAB | Facility: CLINIC | Age: 68
End: 2023-04-25

## 2023-04-25 DIAGNOSIS — E11.65 TYPE 2 DIABETES MELLITUS WITH HYPERGLYCEMIA, WITH LONG-TERM CURRENT USE OF INSULIN (HCC): ICD-10-CM

## 2023-04-25 DIAGNOSIS — E55.9 VITAMIN D DEFICIENCY: ICD-10-CM

## 2023-04-25 DIAGNOSIS — Z79.4 TYPE 2 DIABETES MELLITUS WITH HYPERGLYCEMIA, WITH LONG-TERM CURRENT USE OF INSULIN (HCC): ICD-10-CM

## 2023-04-25 DIAGNOSIS — E78.2 MIXED HYPERLIPIDEMIA: ICD-10-CM

## 2023-04-25 DIAGNOSIS — N52.9 ERECTILE DYSFUNCTION, UNSPECIFIED ERECTILE DYSFUNCTION TYPE: ICD-10-CM

## 2023-04-25 LAB
25(OH)D3 SERPL-MCNC: 82.7 NG/ML (ref 30–100)
ALBUMIN SERPL BCP-MCNC: 3.6 G/DL (ref 3.5–5)
ALP SERPL-CCNC: 63 U/L (ref 46–116)
ALT SERPL W P-5'-P-CCNC: 23 U/L (ref 12–78)
ANION GAP SERPL CALCULATED.3IONS-SCNC: 7 MMOL/L (ref 4–13)
AST SERPL W P-5'-P-CCNC: 16 U/L (ref 5–45)
BASOPHILS # BLD AUTO: 0.08 THOUSANDS/ΜL (ref 0–0.1)
BASOPHILS NFR BLD AUTO: 1 % (ref 0–1)
BILIRUB SERPL-MCNC: 1.03 MG/DL (ref 0.2–1)
BUN SERPL-MCNC: 14 MG/DL (ref 5–25)
CALCIUM SERPL-MCNC: 9.8 MG/DL (ref 8.3–10.1)
CHLORIDE SERPL-SCNC: 103 MMOL/L (ref 96–108)
CHOLEST SERPL-MCNC: 186 MG/DL
CO2 SERPL-SCNC: 26 MMOL/L (ref 21–32)
CREAT SERPL-MCNC: 0.78 MG/DL (ref 0.6–1.3)
CREAT UR-MCNC: 63.7 MG/DL
EOSINOPHIL # BLD AUTO: 0.49 THOUSAND/ΜL (ref 0–0.61)
EOSINOPHIL NFR BLD AUTO: 5 % (ref 0–6)
ERYTHROCYTE [DISTWIDTH] IN BLOOD BY AUTOMATED COUNT: 11.9 % (ref 11.6–15.1)
GFR SERPL CREATININE-BSD FRML MDRD: 94 ML/MIN/1.73SQ M
GLUCOSE P FAST SERPL-MCNC: 205 MG/DL (ref 65–99)
HCT VFR BLD AUTO: 52.7 % (ref 36.5–49.3)
HDLC SERPL-MCNC: 51 MG/DL
HGB BLD-MCNC: 17.6 G/DL (ref 12–17)
IMM GRANULOCYTES # BLD AUTO: 0.02 THOUSAND/UL (ref 0–0.2)
IMM GRANULOCYTES NFR BLD AUTO: 0 % (ref 0–2)
LDLC SERPL CALC-MCNC: 112 MG/DL (ref 0–100)
LYMPHOCYTES # BLD AUTO: 2.33 THOUSANDS/ΜL (ref 0.6–4.47)
LYMPHOCYTES NFR BLD AUTO: 25 % (ref 14–44)
MCH RBC QN AUTO: 29.3 PG (ref 26.8–34.3)
MCHC RBC AUTO-ENTMCNC: 33.4 G/DL (ref 31.4–37.4)
MCV RBC AUTO: 88 FL (ref 82–98)
MICROALBUMIN UR-MCNC: 11.1 MG/L (ref 0–20)
MICROALBUMIN/CREAT 24H UR: 17 MG/G CREATININE (ref 0–30)
MONOCYTES # BLD AUTO: 0.59 THOUSAND/ΜL (ref 0.17–1.22)
MONOCYTES NFR BLD AUTO: 6 % (ref 4–12)
NEUTROPHILS # BLD AUTO: 6 THOUSANDS/ΜL (ref 1.85–7.62)
NEUTS SEG NFR BLD AUTO: 63 % (ref 43–75)
NONHDLC SERPL-MCNC: 135 MG/DL
NRBC BLD AUTO-RTO: 0 /100 WBCS
PLATELET # BLD AUTO: 272 THOUSANDS/UL (ref 149–390)
PMV BLD AUTO: 10.3 FL (ref 8.9–12.7)
POTASSIUM SERPL-SCNC: 4.8 MMOL/L (ref 3.5–5.3)
PROT SERPL-MCNC: 7.7 G/DL (ref 6.4–8.4)
RBC # BLD AUTO: 6.01 MILLION/UL (ref 3.88–5.62)
SODIUM SERPL-SCNC: 136 MMOL/L (ref 135–147)
TRIGL SERPL-MCNC: 117 MG/DL
TSH SERPL DL<=0.05 MIU/L-ACNC: 0.46 UIU/ML (ref 0.45–4.5)
WBC # BLD AUTO: 9.51 THOUSAND/UL (ref 4.31–10.16)

## 2023-04-28 LAB
EST. AVERAGE GLUCOSE BLD GHB EST-MCNC: 197 MG/DL
HBA1C MFR BLD: 8.5 %

## 2023-05-03 ENCOUNTER — OFFICE VISIT (OUTPATIENT)
Dept: INTERNAL MEDICINE CLINIC | Facility: CLINIC | Age: 68
End: 2023-05-03

## 2023-05-03 VITALS
HEART RATE: 101 BPM | WEIGHT: 159.6 LBS | TEMPERATURE: 98.3 F | SYSTOLIC BLOOD PRESSURE: 116 MMHG | HEIGHT: 68 IN | BODY MASS INDEX: 24.19 KG/M2 | DIASTOLIC BLOOD PRESSURE: 72 MMHG | OXYGEN SATURATION: 97 %

## 2023-05-03 DIAGNOSIS — Z12.11 SCREENING FOR COLON CANCER: ICD-10-CM

## 2023-05-03 DIAGNOSIS — E11.65 TYPE 2 DIABETES MELLITUS WITH HYPERGLYCEMIA, WITH LONG-TERM CURRENT USE OF INSULIN (HCC): ICD-10-CM

## 2023-05-03 DIAGNOSIS — E78.2 MIXED HYPERLIPIDEMIA: ICD-10-CM

## 2023-05-03 DIAGNOSIS — N52.9 ERECTILE DYSFUNCTION, UNSPECIFIED ERECTILE DYSFUNCTION TYPE: ICD-10-CM

## 2023-05-03 DIAGNOSIS — E11.65 TYPE 2 DIABETES MELLITUS WITH HYPERGLYCEMIA, WITHOUT LONG-TERM CURRENT USE OF INSULIN (HCC): Primary | ICD-10-CM

## 2023-05-03 DIAGNOSIS — Z79.4 TYPE 2 DIABETES MELLITUS WITH HYPERGLYCEMIA, WITH LONG-TERM CURRENT USE OF INSULIN (HCC): ICD-10-CM

## 2023-05-03 DIAGNOSIS — D75.1 ERYTHROCYTOSIS: ICD-10-CM

## 2023-05-03 DIAGNOSIS — E55.9 VITAMIN D DEFICIENCY: ICD-10-CM

## 2023-05-03 RX ORDER — TADALAFIL 5 MG/1
5 TABLET ORAL DAILY PRN
Qty: 10 TABLET | Refills: 3 | Status: SHIPPED | OUTPATIENT
Start: 2023-05-03

## 2023-05-03 RX ORDER — ERGOCALCIFEROL 1.25 MG/1
50000 CAPSULE ORAL
Qty: 6 CAPSULE | Refills: 3 | Status: SHIPPED | OUTPATIENT
Start: 2023-05-03

## 2023-05-03 RX ORDER — SILDENAFIL 100 MG/1
100 TABLET, FILM COATED ORAL DAILY PRN
Qty: 10 TABLET | Refills: 3 | Status: CANCELLED | OUTPATIENT
Start: 2023-05-03

## 2023-05-03 NOTE — PROGRESS NOTES
Assessment/Plan:    No problem-specific Assessment & Plan notes found for this encounter  Diagnoses and all orders for this visit:    Type 2 diabetes mellitus with hyperglycemia, without long-term current use of insulin (HCC)  -     Comprehensive metabolic panel; Future  -     Hemoglobin A1C; Future    Erectile dysfunction, unspecified erectile dysfunction type  -     tadalafil (CIALIS) 5 MG tablet; Take 1 tablet (5 mg total) by mouth daily as needed for erectile dysfunction    Mixed hyperlipidemia  -     Comprehensive metabolic panel; Future  -     Lipid panel; Future  -     TSH, 3rd generation; Future    Vitamin D deficiency  -     ergocalciferol (VITAMIN D2) 50,000 units; Take 1 capsule (50,000 Units total) by mouth every 14 (fourteen) days    Type 2 diabetes mellitus with hyperglycemia, with long-term current use of insulin ([facility])  -     Canagliflozin (Invokana) 300 MG TABS; Take 1 tablet (300 mg total) by mouth daily before breakfast  -     Comprehensive metabolic panel; Future    Screening for colon cancer  -     Ambulatory Referral to Gastroenterology; Future    Erythrocytosis  -     CBC and differential; Future   Orders and recommendations as noted above  Previous laboratory testing reviewed with him  Hemoglobin A1c has improved significantly but not yet at goal   We will increase his Invokana  Continue with the glimepiride and metformin  Watch for any hypoglycemic episodes  Continue with the lisinopril as previously  Blood pressure well controlled  Watch salt intake  Stay adequately hydrated  Continue with the pravastatin  Discussed goal of maintaining LDL less than 100  Continue with vitamin D supplementation  We will decrease this to every other week  He is agreeable to proceed with colonoscopy but wants to wait till the summer  Slip given for laboratory testing prior to his next visit  We will have him follow-up in about 3 to 5 months or sooner if needed      Subjective:      Patient ID: Lester Dobson  is a 74 y o  male  He presents for routine follow-up  Has been doing well  Continues to work full-time and plans to continue with this  Enjoys spending time with his granddaughter  Has been checking his blood sugars and they have been running from the upper 100s to the low 200s  This is an improvement for him  Has been generally feeling better  Tolerating the Invokana, glimepiride, and metformin without difficulty  Denies any GI issues  Denies any hypoglycemic episodes  Tolerating his lisinopril without difficulty  Denies any headaches or localized weakness  Tries to stay well-hydrated  Continues with the pravastatin  Denies any significant muscle aches or weakness  Continues with erectile dysfunction despite the use of Viagra in the past   Is inquiring about other options  The following portions of the patient's history were reviewed and updated as appropriate:   He  has a past medical history of Diabetes mellitus ([facility] [address] ), Hyperlipidemia, Hypertension, Pancreatitis, Pancreatitis, and Tobacco abuse (6/14/2016)  He   Patient Active Problem List    Diagnosis Date Noted    Weight loss 08/24/2021    Allergic rhinitis 04/20/2020    Chronic sinusitis 12/10/2019    Leukocytosis 12/10/2019    Positive colorectal cancer screening using Cologuard test 07/25/2019    Muscle weakness 04/02/2019    Erectile dysfunction 04/02/2019    Primary osteoarthritis involving multiple joints 04/02/2019    Vitamin D deficiency 04/01/2019    Hyponatremia 06/16/2016    Type 2 diabetes mellitus with hyperglycemia, without long-term current use of insulin ([facility] [address] ) 06/14/2016    Hyperlipidemia 06/14/2016     He  has a past surgical history that includes Ankle surgery and Incision and drainage of wound (Right, 6/15/2016)  His family history includes Dementia in his father; Diabetes in his mother  He  reports that he has quit smoking  His smoking use included cigarettes   He has a 40 00 pack-year smoking history  He has never used smokeless tobacco  He reports that he does not drink alcohol and does not use drugs  Current Outpatient Medications   Medication Sig Dispense Refill    Canagliflozin (Invokana) 300 MG TABS Take 1 tablet (300 mg total) by mouth daily before breakfast 90 tablet 3    ergocalciferol (VITAMIN D2) 50,000 units Take 1 capsule (50,000 Units total) by mouth every 14 (fourteen) days 6 capsule 3    glimepiride (AMARYL) 2 mg tablet TAKE 1 TABLET BY MOUTH EVERY DAY WITH BREAKFAST 90 tablet 0    lisinopril (ZESTRIL) 2 5 mg tablet TAKE 1 TABLET BY MOUTH EVERY DAY 90 tablet 1    metFORMIN (GLUCOPHAGE) 850 mg tablet TAKE 1 TABLET BY MOUTH 3 TIMES A DAY  270 tablet 1    pravastatin (PRAVACHOL) 20 mg tablet TAKE 1 TABLET BY MOUTH EVERY DAY 90 tablet 1    tadalafil (CIALIS) 5 MG tablet Take 1 tablet (5 mg total) by mouth daily as needed for erectile dysfunction 10 tablet 3     No current facility-administered medications for this visit  Current Outpatient Medications on File Prior to Visit   Medication Sig    glimepiride (AMARYL) 2 mg tablet TAKE 1 TABLET BY MOUTH EVERY DAY WITH BREAKFAST    lisinopril (ZESTRIL) 2 5 mg tablet TAKE 1 TABLET BY MOUTH EVERY DAY    metFORMIN (GLUCOPHAGE) 850 mg tablet TAKE 1 TABLET BY MOUTH 3 TIMES A DAY   pravastatin (PRAVACHOL) 20 mg tablet TAKE 1 TABLET BY MOUTH EVERY DAY     No current facility-administered medications on file prior to visit  He is allergic to penicillins       Review of Systems   Constitutional: Negative for activity change, appetite change, chills and fever  HENT: Negative for hearing loss  Eyes: Negative for pain and visual disturbance  Respiratory: Negative for cough, chest tightness and shortness of breath  Cardiovascular: Negative for chest pain and palpitations  Gastrointestinal: Negative for abdominal pain, blood in stool, diarrhea, nausea and vomiting     Endocrine: Negative for polydipsia, polyphagia "and polyuria  See HPI   Genitourinary: Negative for dysuria  See HPI   Musculoskeletal: Negative for arthralgias and gait problem  Skin: Negative for color change  Neurological: Negative for dizziness and headaches  As per HPI   Hematological: Does not bruise/bleed easily  Psychiatric/Behavioral: Positive for sleep disturbance  Negative for behavioral problems  The patient is not nervous/anxious  Objective:      /72 (BP Location: Left arm, Patient Position: Sitting, Cuff Size: Large)   Pulse 101   Temp 98 3 °F (36 8 °C)   Ht 5' 8\" (1 727 m)   Wt 72 4 kg (159 lb 9 6 oz)   SpO2 97%   BMI 24 27 kg/m²          Physical Exam  Vitals and nursing note reviewed  Constitutional:       General: He is not in acute distress  Appearance: He is well-developed and well-groomed  HENT:      Head: Normocephalic and atraumatic  Nose: Nose normal    Eyes:      Conjunctiva/sclera: Conjunctivae normal       Pupils: Pupils are equal, round, and reactive to light  Cardiovascular:      Rate and Rhythm: Normal rate and regular rhythm  Heart sounds: Normal heart sounds  No murmur heard  No friction rub  No gallop  Pulmonary:      Breath sounds: No wheezing or rales  Chest:      Chest wall: No tenderness  Abdominal:      General: There is no distension  Tenderness: There is no abdominal tenderness  There is no guarding or rebound  Lymphadenopathy:      Cervical: No cervical adenopathy  Skin:     General: Skin is warm and dry  Neurological:      Mental Status: He is alert and oriented to person, place, and time  Psychiatric:         Mood and Affect: Mood and affect normal          Speech: Speech normal          Behavior: Behavior normal  Behavior is cooperative           Cognition and Memory: Cognition and memory normal              Below is the patient's most recent value for Albumin, ALT, AST, BUN, Calcium, Chloride, Cholesterol, CO2, Creatinine, GFR, " Glucose, HDL, Hematocrit, Hemoglobin, Hemoglobin A1C, LDL, Magnesium, Phosphorus, Platelets, Potassium, PSA, Sodium, Triglycerides, and WBC  Lab Results   Component Value Date    ALT 23 04/25/2023    AST 16 04/25/2023    BUN 14 04/25/2023    CALCIUM 9 8 04/25/2023     04/25/2023    CHOL 138 03/11/2015    CO2 26 04/25/2023    CREATININE 0 78 04/25/2023    HDL 51 04/25/2023    HCT 52 7 (H) 04/25/2023    HGB 17 6 (H) 04/25/2023    HGBA1C 8 5 (H) 04/25/2023    MG 1 7 06/16/2016    PHOS 2 7 06/16/2016     04/25/2023    K 4 8 04/25/2023    PSA 0 6 12/29/2022     08/23/2015    TRIG 117 04/25/2023    WBC 9 51 04/25/2023     Note: for a comprehensive list of the patient's lab results, access the Results Review activity

## 2023-06-06 ENCOUNTER — OFFICE VISIT (OUTPATIENT)
Dept: URGENT CARE | Facility: CLINIC | Age: 68
End: 2023-06-06
Payer: COMMERCIAL

## 2023-06-06 VITALS
DIASTOLIC BLOOD PRESSURE: 65 MMHG | TEMPERATURE: 98.1 F | RESPIRATION RATE: 18 BRPM | HEART RATE: 101 BPM | OXYGEN SATURATION: 95 % | SYSTOLIC BLOOD PRESSURE: 122 MMHG

## 2023-06-06 DIAGNOSIS — L03.113 CELLULITIS OF RIGHT ELBOW: Primary | ICD-10-CM

## 2023-06-06 DIAGNOSIS — E11.9 TYPE 2 DIABETES MELLITUS WITHOUT COMPLICATION, UNSPECIFIED WHETHER LONG TERM INSULIN USE (HCC): ICD-10-CM

## 2023-06-06 LAB
GLUCOSE SERPL-MCNC: 283 MG/DL (ref 65–140)
SL AMB POCT GLUCOSE BLD: 283

## 2023-06-06 PROCEDURE — 99214 OFFICE O/P EST MOD 30 MIN: CPT | Performed by: NURSE PRACTITIONER

## 2023-06-06 PROCEDURE — 82948 REAGENT STRIP/BLOOD GLUCOSE: CPT | Performed by: NURSE PRACTITIONER

## 2023-06-06 RX ORDER — DOXYCYCLINE 100 MG/1
100 CAPSULE ORAL 2 TIMES DAILY
Qty: 14 CAPSULE | Refills: 0 | Status: SHIPPED | OUTPATIENT
Start: 2023-06-06 | End: 2023-06-13

## 2023-06-06 NOTE — PATIENT INSTRUCTIONS
You have been prescribed doxycycline for cellulitis of the right elbow  You are to take all the medication  Apply warm compresses    You are to take tylenol or motrin for pain  Follow up with your PCP in 3-5 days   Go to the eD if symptoms worsen  See orthopedics if symptoms worsen - you have been given a referral

## 2023-06-06 NOTE — PROGRESS NOTES
"  [facility]        NAME: Anna Light  is a 74 y o  male  : 1955    MRN: [id number]  DATE: 2023  TIME: 4:04 PM    Assessment and Plan   Cellulitis of right elbow [S30 460]  1  Cellulitis of right elbow  doxycycline monohydrate (MONODOX) 100 mg capsule    Ambulatory Referral to Orthopedic Surgery      2  Type 2 diabetes mellitus without complication, unspecified whether long term insulin use (HCC)  POCT blood glucose            Patient Instructions       Follow up with PCP in 3-5 days  Proceed to  ER if symptoms worsen  You have been prescribed doxycycline for cellulitis of the right elbow  You are to take all the medication  Apply warm compresses  You are to take tylenol or motrin for pain  Follow up with your PCP in 3-5 days   Go to the eD if symptoms worsen  See orthopedics if symptoms worsen - you have been given a referral           Chief Complaint     Chief Complaint   Patient presents with   • Joint Swelling     C/o right sided elbow redness and swelling onset \"\"  Denies follow up with PCP prior to UC visit  Denies any OTC medications today  Denies hx of gout  Hx type II diabetes  History of Present Illness       This is a 74year old male who states is in a  a lot and drives and is on his cell phone a lot and his right elbow rests on the consol  He states that over the weekend he noted swelling and pain of the right elbow  It is now red but has improved since yesterday  Denies fevers, chills  States he is able to move but with a little pain  He is diabetic  Review of Systems   Review of Systems   Constitutional: Negative  HENT: Negative  Respiratory: Negative  Cardiovascular: Negative  Gastrointestinal: Negative  Endocrine: Negative  Genitourinary: Negative  Musculoskeletal:        Right elbow redness, warmth and swelling    Skin: Positive for color change  Allergic/Immunologic: Negative  Neurological: Negative    " Hematological: Negative  Psychiatric/Behavioral: Negative            Current Medications       Current Outpatient Medications:   •  doxycycline monohydrate (MONODOX) 100 mg capsule, Take 1 capsule (100 mg total) by mouth 2 (two) times a day for 7 days, Disp: 14 capsule, Rfl: 0  •  Canagliflozin (Invokana) 300 MG TABS, Take 1 tablet (300 mg total) by mouth daily before breakfast, Disp: 90 tablet, Rfl: 3  •  ergocalciferol (VITAMIN D2) 50,000 units, Take 1 capsule (50,000 Units total) by mouth every 14 (fourteen) days, Disp: 6 capsule, Rfl: 3  •  glimepiride (AMARYL) 2 mg tablet, TAKE 1 TABLET BY MOUTH EVERY DAY WITH BREAKFAST, Disp: 90 tablet, Rfl: 0  •  lisinopril (ZESTRIL) 2 5 mg tablet, TAKE 1 TABLET BY MOUTH EVERY DAY, Disp: 90 tablet, Rfl: 1  •  metFORMIN (GLUCOPHAGE) 850 mg tablet, TAKE 1 TABLET BY MOUTH 3 TIMES A DAY , Disp: 270 tablet, Rfl: 1  •  pravastatin (PRAVACHOL) 20 mg tablet, TAKE 1 TABLET BY MOUTH EVERY DAY, Disp: 90 tablet, Rfl: 1  •  tadalafil (CIALIS) 5 MG tablet, Take 1 tablet (5 mg total) by mouth daily as needed for erectile dysfunction, Disp: 10 tablet, Rfl: 3    Current Allergies     Allergies as of 06/06/2023 - Reviewed 06/06/2023   Allergen Reaction Noted   • Penicillins  01/19/2016            The following portions of the patient's history were reviewed and updated as appropriate: allergies, current medications, past family history, past medical history, past social history, past surgical history and problem list      Past Medical History:   Diagnosis Date   • Diabetes mellitus ([facility] [address] )    • Hyperlipidemia    • Hypertension    • Pancreatitis    • Pancreatitis    • Tobacco abuse 6/14/2016       Past Surgical History:   Procedure Laterality Date   • ANKLE SURGERY     • INCISION AND DRAINAGE OF WOUND Right 6/15/2016    Procedure: INCISION AND DRAINAGE (I&D) EXTREMITY;  Surgeon: Nya Farmer MD;  Location: [facility];  Service:        Family History   Problem Relation Age of Onset   • Diabetes Mother    • Dementia Father          Medications have been verified  Objective   /65 (BP Location: Left arm, Patient Position: Sitting)   Pulse 101   Temp 98 1 °F (36 7 °C) (Temporal)   Resp 18   SpO2 95%   No LMP for male patient  Physical Exam     Physical Exam  Vitals and nursing note reviewed  Constitutional:       General: He is not in acute distress  Appearance: Normal appearance  He is normal weight  He is not ill-appearing, toxic-appearing or diaphoretic  HENT:      Head: Normocephalic and atraumatic  Nose: Nose normal       Mouth/Throat:      Mouth: Mucous membranes are moist    Cardiovascular:      Rate and Rhythm: Normal rate  Pulses: Normal pulses  Pulmonary:      Effort: Pulmonary effort is normal    Musculoskeletal:         General: Swelling and tenderness present  No deformity or signs of injury  Normal range of motion  Cervical back: Normal range of motion  Comments: FROM right elbow  No pain, rigidity with movement   Skin:     General: Skin is warm and dry  Capillary Refill: Capillary refill takes less than 2 seconds  Findings: Erythema present  Neurological:      General: No focal deficit present  Mental Status: He is alert and oriented to person, place, and time  Psychiatric:         Mood and Affect: Mood normal          Behavior: Behavior normal          Thought Content:  Thought content normal          Judgment: Judgment normal

## 2023-06-12 DIAGNOSIS — Z79.4 TYPE 2 DIABETES MELLITUS WITH HYPERGLYCEMIA, WITH LONG-TERM CURRENT USE OF INSULIN (HCC): ICD-10-CM

## 2023-06-12 DIAGNOSIS — E11.65 TYPE 2 DIABETES MELLITUS WITH HYPERGLYCEMIA, WITH LONG-TERM CURRENT USE OF INSULIN (HCC): ICD-10-CM

## 2023-06-12 RX ORDER — GLIMEPIRIDE 2 MG/1
TABLET ORAL
Qty: 90 TABLET | Refills: 0 | Status: SHIPPED | OUTPATIENT
Start: 2023-06-12

## 2023-08-25 ENCOUNTER — PREP FOR PROCEDURE (OUTPATIENT)
Age: 68
End: 2023-08-25

## 2023-08-25 ENCOUNTER — TELEPHONE (OUTPATIENT)
Age: 68
End: 2023-08-25

## 2023-08-25 DIAGNOSIS — Z12.11 SCREENING FOR COLON CANCER: Primary | ICD-10-CM

## 2023-08-25 NOTE — TELEPHONE ENCOUNTER
Scheduled date of colonoscopy (as of today): 9/11/23  Physician performing colonoscopy: [place]   Location of colonoscopy: [facility]  Bowel prep instructions mailed to home  address     Pt is DIABETIC

## 2023-09-08 DIAGNOSIS — Z79.4 TYPE 2 DIABETES MELLITUS WITH HYPERGLYCEMIA, WITH LONG-TERM CURRENT USE OF INSULIN (HCC): ICD-10-CM

## 2023-09-08 DIAGNOSIS — E11.65 TYPE 2 DIABETES MELLITUS WITH HYPERGLYCEMIA, WITH LONG-TERM CURRENT USE OF INSULIN (HCC): ICD-10-CM

## 2023-09-08 RX ORDER — GLIMEPIRIDE 2 MG/1
TABLET ORAL
Qty: 90 TABLET | Refills: 0 | Status: SHIPPED | OUTPATIENT
Start: 2023-09-08

## 2023-09-11 ENCOUNTER — ANESTHESIA (OUTPATIENT)
Dept: GASTROENTEROLOGY | Facility: HOSPITAL | Age: 68
End: 2023-09-11

## 2023-09-11 ENCOUNTER — ANESTHESIA EVENT (OUTPATIENT)
Dept: GASTROENTEROLOGY | Facility: HOSPITAL | Age: 68
End: 2023-09-11

## 2023-09-11 ENCOUNTER — HOSPITAL ENCOUNTER (OUTPATIENT)
Dept: GASTROENTEROLOGY | Facility: HOSPITAL | Age: 68
Setting detail: OUTPATIENT SURGERY
Discharge: HOME | End: 2023-09-11
Attending: STUDENT IN AN ORGANIZED HEALTH CARE EDUCATION/TRAINING PROGRAM | Admitting: STUDENT IN AN ORGANIZED HEALTH CARE EDUCATION/TRAINING PROGRAM
Payer: COMMERCIAL

## 2023-09-11 VITALS
SYSTOLIC BLOOD PRESSURE: 102 MMHG | WEIGHT: 160 LBS | HEART RATE: 88 BPM | DIASTOLIC BLOOD PRESSURE: 70 MMHG | BODY MASS INDEX: 23.7 KG/M2 | RESPIRATION RATE: 18 BRPM | OXYGEN SATURATION: 97 % | TEMPERATURE: 97.5 F | HEIGHT: 69 IN

## 2023-09-11 DIAGNOSIS — Z86.010 PERSONAL HISTORY OF COLONIC POLYPS: ICD-10-CM

## 2023-09-11 DIAGNOSIS — Z12.11 SCREENING FOR COLON CANCER: ICD-10-CM

## 2023-09-11 LAB
GLUCOSE SERPL-MCNC: 267 MG/DL (ref 65–140)
GLUCOSE SERPL-MCNC: 282 MG/DL (ref 65–140)

## 2023-09-11 PROCEDURE — 82948 REAGENT STRIP/BLOOD GLUCOSE: CPT

## 2023-09-11 PROCEDURE — 88305 TISSUE EXAM BY PATHOLOGIST: CPT | Performed by: PATHOLOGY

## 2023-09-11 RX ORDER — SODIUM CHLORIDE, SODIUM LACTATE, POTASSIUM CHLORIDE, CALCIUM CHLORIDE 600; 310; 30; 20 MG/100ML; MG/100ML; MG/100ML; MG/100ML
INJECTION, SOLUTION INTRAVENOUS CONTINUOUS PRN
Status: DISCONTINUED | OUTPATIENT
Start: 2023-09-11 | End: 2023-09-11

## 2023-09-11 RX ORDER — SODIUM CHLORIDE, SODIUM LACTATE, POTASSIUM CHLORIDE, CALCIUM CHLORIDE 600; 310; 30; 20 MG/100ML; MG/100ML; MG/100ML; MG/100ML
125 INJECTION, SOLUTION INTRAVENOUS CONTINUOUS
Status: DISCONTINUED | OUTPATIENT
Start: 2023-09-11 | End: 2023-09-15 | Stop reason: HOSPADM

## 2023-09-11 RX ORDER — SODIUM CHLORIDE, SODIUM LACTATE, POTASSIUM CHLORIDE, CALCIUM CHLORIDE 600; 310; 30; 20 MG/100ML; MG/100ML; MG/100ML; MG/100ML
20 INJECTION, SOLUTION INTRAVENOUS CONTINUOUS
Status: DISCONTINUED | OUTPATIENT
Start: 2023-09-11 | End: 2023-09-15 | Stop reason: HOSPADM

## 2023-09-11 RX ORDER — PROPOFOL 10 MG/ML
INJECTION, EMULSION INTRAVENOUS AS NEEDED
Status: DISCONTINUED | OUTPATIENT
Start: 2023-09-11 | End: 2023-09-11

## 2023-09-11 RX ORDER — PROPOFOL 10 MG/ML
INJECTION, EMULSION INTRAVENOUS CONTINUOUS PRN
Status: DISCONTINUED | OUTPATIENT
Start: 2023-09-11 | End: 2023-09-11

## 2023-09-11 RX ADMIN — SODIUM CHLORIDE, SODIUM LACTATE, POTASSIUM CHLORIDE, AND CALCIUM CHLORIDE: .6; .31; .03; .02 INJECTION, SOLUTION INTRAVENOUS at 07:29

## 2023-09-11 RX ADMIN — PROPOFOL 100 MCG/KG/MIN: 10 INJECTION, EMULSION INTRAVENOUS at 07:31

## 2023-09-11 RX ADMIN — SODIUM CHLORIDE, SODIUM LACTATE, POTASSIUM CHLORIDE, AND CALCIUM CHLORIDE 125 ML/HR: .6; .31; .03; .02 INJECTION, SOLUTION INTRAVENOUS at 07:20

## 2023-09-11 RX ADMIN — PROPOFOL 100 MG: 10 INJECTION, EMULSION INTRAVENOUS at 07:31

## 2023-09-11 NOTE — H&P
History and Physical - [facility]  Blanca De La Torre. 74 y.o. male MRN: [id number]                  HPI: Blanca Jean is a 74y.o. year old male who presents for colonoscopy for CRC screen after previous endoscopy in 2019 showing 15 mm tubular adenoma removed with hot snare and poor prep. REVIEW OF SYSTEMS: Per the HPI, and otherwise unremarkable.     Historical Information   Past Medical History:   Diagnosis Date   • Diabetes mellitus ([address])    • Hyperlipidemia    • Hypertension    • Pancreatitis    • Pancreatitis    • Tobacco abuse 6/14/2016     Past Surgical History:   Procedure Laterality Date   • ANKLE SURGERY     • INCISION AND DRAINAGE OF WOUND Right 6/15/2016    Procedure: INCISION AND DRAINAGE (I&D) EXTREMITY;  Surgeon: Barber Ruby MD;  Location: [facility];  Service:      Social History   Social History     Substance and Sexual Activity   Alcohol Use No     Social History     Substance and Sexual Activity   Drug Use No     Social History     Tobacco Use   Smoking Status Former   • Packs/day: 2.00   • Years: 20.00   • Total pack years: 40.00   • Types: Cigarettes   • Quit date: 2020   • Years since quitting: 3.6   Smokeless Tobacco Never     Family History   Problem Relation Age of Onset   • Diabetes Mother    • Dementia Father        Meds/Allergies       Current Outpatient Medications:   •  Canagliflozin (Invokana) 300 MG TABS  •  ergocalciferol (VITAMIN D2) 50,000 units  •  glimepiride (AMARYL) 2 mg tablet  •  lisinopril (ZESTRIL) 2.5 mg tablet  •  metFORMIN (GLUCOPHAGE) 850 mg tablet  •  pravastatin (PRAVACHOL) 20 mg tablet  •  tadalafil (CIALIS) 5 MG tablet    Allergies   Allergen Reactions   • Penicillins        Objective     /78   Pulse 93   Temp (!) 97.3 °F (36.3 °C) (Temporal)   Resp 20   Ht 5' 9" (1.753 m)   Wt 72.6 kg (160 lb)   SpO2 96%   BMI 23.63 kg/m²       PHYSICAL EXAM    Gen: NAD  Head: NCAT  CV: RRR  CHEST: Clear  ABD: soft, NT/ND  EXT: no edema      ASSESSMENT/PLAN:  This is a 74y.o. year old male here for colonoscopy, and he is stable and optimized for his procedure.

## 2023-09-11 NOTE — ANESTHESIA PREPROCEDURE EVALUATION
Procedure:  COLONOSCOPY    Relevant Problems   CARDIO   (+) Hyperlipidemia      ENDO   (+) Type 2 diabetes mellitus with hyperglycemia, without long-term current use of insulin (HCC)      MUSCULOSKELETAL   (+) Primary osteoarthritis involving multiple joints        Physical Exam    Airway    Mallampati score: II  TM Distance: >3 FB  Neck ROM: full     Dental   upper dentures,     Cardiovascular  Cardiovascular exam normal    Pulmonary  Pulmonary exam normal     Other Findings        Anesthesia Plan  ASA Score- 2     Anesthesia Type- IV sedation with anesthesia with ASA Monitors. Additional Monitors:   Airway Plan:           Plan Factors-    Chart reviewed. EKG reviewed. Existing labs reviewed. Patient summary reviewed. Induction- intravenous. Postoperative Plan-     Informed Consent- Anesthetic plan and risks discussed with patient. I personally reviewed this patient with the CRNA. Discussed and agreed on the Anesthesia Plan with the CRNA. Aletha Paul

## 2023-09-11 NOTE — ANESTHESIA POSTPROCEDURE EVALUATION
Post-Op Assessment Note    CV Status:  Stable  Pain Score: 0    Pain management: adequate     Mental Status:  Alert and awake   Hydration Status:  Euvolemic   PONV Controlled:  Controlled   Airway Patency:  Patent      Post Op Vitals Reviewed: Yes      Staff: CRNA         No notable events documented.     BP   99/50   Temp 97   Pulse 70   Resp 12   SpO2 99

## 2023-09-14 PROCEDURE — 88305 TISSUE EXAM BY PATHOLOGIST: CPT | Performed by: PATHOLOGY

## 2023-09-25 ENCOUNTER — APPOINTMENT (OUTPATIENT)
Dept: LAB | Facility: CLINIC | Age: 68
End: 2023-09-25
Payer: COMMERCIAL

## 2023-09-25 DIAGNOSIS — Z79.4 TYPE 2 DIABETES MELLITUS WITH HYPERGLYCEMIA, WITH LONG-TERM CURRENT USE OF INSULIN (HCC): ICD-10-CM

## 2023-09-25 DIAGNOSIS — E11.65 TYPE 2 DIABETES MELLITUS WITH HYPERGLYCEMIA, WITH LONG-TERM CURRENT USE OF INSULIN (HCC): ICD-10-CM

## 2023-09-25 DIAGNOSIS — E78.2 MIXED HYPERLIPIDEMIA: ICD-10-CM

## 2023-09-25 DIAGNOSIS — D75.1 ERYTHROCYTOSIS: ICD-10-CM

## 2023-09-25 DIAGNOSIS — E11.65 TYPE 2 DIABETES MELLITUS WITH HYPERGLYCEMIA, WITHOUT LONG-TERM CURRENT USE OF INSULIN (HCC): ICD-10-CM

## 2023-09-25 LAB
ALBUMIN SERPL BCP-MCNC: 4.4 G/DL (ref 3.5–5)
ALP SERPL-CCNC: 69 U/L (ref 34–104)
ALT SERPL W P-5'-P-CCNC: 20 U/L (ref 7–52)
ANION GAP SERPL CALCULATED.3IONS-SCNC: 12 MMOL/L
AST SERPL W P-5'-P-CCNC: 19 U/L (ref 13–39)
BASOPHILS # BLD AUTO: 0.11 THOUSANDS/ÂΜL (ref 0–0.1)
BASOPHILS NFR BLD AUTO: 1 % (ref 0–1)
BILIRUB SERPL-MCNC: 1.16 MG/DL (ref 0.2–1)
BUN SERPL-MCNC: 13 MG/DL (ref 5–25)
CALCIUM SERPL-MCNC: 10.4 MG/DL (ref 8.4–10.2)
CHLORIDE SERPL-SCNC: 97 MMOL/L (ref 96–108)
CHOLEST SERPL-MCNC: 244 MG/DL
CO2 SERPL-SCNC: 28 MMOL/L (ref 21–32)
CREAT SERPL-MCNC: 0.79 MG/DL (ref 0.6–1.3)
CREAT UR-MCNC: 50.4 MG/DL
EOSINOPHIL # BLD AUTO: 0.52 THOUSAND/ÂΜL (ref 0–0.61)
EOSINOPHIL NFR BLD AUTO: 6 % (ref 0–6)
ERYTHROCYTE [DISTWIDTH] IN BLOOD BY AUTOMATED COUNT: 11.9 % (ref 11.6–15.1)
EST. AVERAGE GLUCOSE BLD GHB EST-MCNC: 217 MG/DL
GFR SERPL CREATININE-BSD FRML MDRD: 94 ML/MIN/1.73SQ M
GLUCOSE P FAST SERPL-MCNC: 274 MG/DL (ref 65–99)
HBA1C MFR BLD: 9.2 %
HCT VFR BLD AUTO: 54.4 % (ref 36.5–49.3)
HDLC SERPL-MCNC: 59 MG/DL
HGB BLD-MCNC: 18.4 G/DL (ref 12–17)
IMM GRANULOCYTES # BLD AUTO: 0.03 THOUSAND/UL (ref 0–0.2)
IMM GRANULOCYTES NFR BLD AUTO: 0 % (ref 0–2)
LDLC SERPL CALC-MCNC: 148 MG/DL (ref 0–100)
LYMPHOCYTES # BLD AUTO: 3.1 THOUSANDS/ÂΜL (ref 0.6–4.47)
LYMPHOCYTES NFR BLD AUTO: 33 % (ref 14–44)
MCH RBC QN AUTO: 30 PG (ref 26.8–34.3)
MCHC RBC AUTO-ENTMCNC: 33.8 G/DL (ref 31.4–37.4)
MCV RBC AUTO: 89 FL (ref 82–98)
MICROALBUMIN UR-MCNC: <7 MG/L
MICROALBUMIN/CREAT 24H UR: <14 MG/G CREATININE (ref 0–30)
MONOCYTES # BLD AUTO: 0.75 THOUSAND/ÂΜL (ref 0.17–1.22)
MONOCYTES NFR BLD AUTO: 8 % (ref 4–12)
NEUTROPHILS # BLD AUTO: 4.81 THOUSANDS/ÂΜL (ref 1.85–7.62)
NEUTS SEG NFR BLD AUTO: 52 % (ref 43–75)
NONHDLC SERPL-MCNC: 185 MG/DL
NRBC BLD AUTO-RTO: 0 /100 WBCS
PLATELET # BLD AUTO: 306 THOUSANDS/UL (ref 149–390)
PMV BLD AUTO: 10.2 FL (ref 8.9–12.7)
POTASSIUM SERPL-SCNC: 5.3 MMOL/L (ref 3.5–5.3)
PROT SERPL-MCNC: 8 G/DL (ref 6.4–8.4)
RBC # BLD AUTO: 6.14 MILLION/UL (ref 3.88–5.62)
SODIUM SERPL-SCNC: 137 MMOL/L (ref 135–147)
TRIGL SERPL-MCNC: 184 MG/DL
TSH SERPL DL<=0.05 MIU/L-ACNC: 0.81 UIU/ML (ref 0.45–4.5)
WBC # BLD AUTO: 9.32 THOUSAND/UL (ref 4.31–10.16)

## 2023-09-25 PROCEDURE — 83036 HEMOGLOBIN GLYCOSYLATED A1C: CPT

## 2023-09-25 PROCEDURE — 84443 ASSAY THYROID STIM HORMONE: CPT

## 2023-09-25 PROCEDURE — 36415 COLL VENOUS BLD VENIPUNCTURE: CPT

## 2023-09-25 PROCEDURE — 80053 COMPREHEN METABOLIC PANEL: CPT

## 2023-09-25 PROCEDURE — 80061 LIPID PANEL: CPT

## 2023-09-25 PROCEDURE — 85025 COMPLETE CBC W/AUTO DIFF WBC: CPT

## 2023-09-27 ENCOUNTER — RA CDI HCC (OUTPATIENT)
Dept: OTHER | Facility: HOSPITAL | Age: 68
End: 2023-09-27

## 2023-09-27 NOTE — PROGRESS NOTES
[address] [facility] coding opportunities       Chart reviewed, no opportunity found: [address]        Patients Insurance     Medicare Insurance: [employer]

## 2023-10-04 ENCOUNTER — OFFICE VISIT (OUTPATIENT)
Dept: INTERNAL MEDICINE CLINIC | Facility: CLINIC | Age: 68
End: 2023-10-04
Payer: COMMERCIAL

## 2023-10-04 VITALS
SYSTOLIC BLOOD PRESSURE: 122 MMHG | HEART RATE: 100 BPM | WEIGHT: 160.5 LBS | DIASTOLIC BLOOD PRESSURE: 80 MMHG | HEIGHT: 68 IN | TEMPERATURE: 98.1 F | BODY MASS INDEX: 24.32 KG/M2 | OXYGEN SATURATION: 99 %

## 2023-10-04 DIAGNOSIS — E11.65 TYPE 2 DIABETES MELLITUS WITH HYPERGLYCEMIA, WITH LONG-TERM CURRENT USE OF INSULIN (HCC): ICD-10-CM

## 2023-10-04 DIAGNOSIS — E78.2 MIXED HYPERLIPIDEMIA: ICD-10-CM

## 2023-10-04 DIAGNOSIS — D75.1 ERYTHROCYTOSIS: ICD-10-CM

## 2023-10-04 DIAGNOSIS — Z87.891 HISTORY OF SMOKING 30 OR MORE PACK YEARS: ICD-10-CM

## 2023-10-04 DIAGNOSIS — Z79.4 TYPE 2 DIABETES MELLITUS WITH HYPERGLYCEMIA, WITH LONG-TERM CURRENT USE OF INSULIN (HCC): ICD-10-CM

## 2023-10-04 DIAGNOSIS — M15.9 PRIMARY OSTEOARTHRITIS INVOLVING MULTIPLE JOINTS: ICD-10-CM

## 2023-10-04 DIAGNOSIS — E11.65 TYPE 2 DIABETES MELLITUS WITH HYPERGLYCEMIA, WITHOUT LONG-TERM CURRENT USE OF INSULIN (HCC): Primary | ICD-10-CM

## 2023-10-04 DIAGNOSIS — Z23 NEED FOR INFLUENZA VACCINATION: ICD-10-CM

## 2023-10-04 DIAGNOSIS — E55.9 VITAMIN D DEFICIENCY: ICD-10-CM

## 2023-10-04 DIAGNOSIS — Z12.5 SCREENING FOR PROSTATE CANCER: ICD-10-CM

## 2023-10-04 PROBLEM — R63.4 WEIGHT LOSS: Status: RESOLVED | Noted: 2021-08-24 | Resolved: 2023-10-04

## 2023-10-04 PROBLEM — R19.5 POSITIVE COLORECTAL CANCER SCREENING USING COLOGUARD TEST: Status: RESOLVED | Noted: 2019-07-25 | Resolved: 2023-10-04

## 2023-10-04 PROCEDURE — G0008 ADMIN INFLUENZA VIRUS VAC: HCPCS | Performed by: FAMILY MEDICINE

## 2023-10-04 PROCEDURE — 99214 OFFICE O/P EST MOD 30 MIN: CPT | Performed by: FAMILY MEDICINE

## 2023-10-04 PROCEDURE — 90662 IIV NO PRSV INCREASED AG IM: CPT | Performed by: FAMILY MEDICINE

## 2023-10-04 RX ORDER — GLIMEPIRIDE 2 MG/1
2 TABLET ORAL 2 TIMES DAILY
Qty: 180 TABLET | Refills: 1 | Status: SHIPPED | OUTPATIENT
Start: 2023-10-04

## 2023-10-04 NOTE — PROGRESS NOTES
Assessment/Plan:       1. Type 2 diabetes mellitus with hyperglycemia, without long-term current use of insulin ([facility])  Assessment & Plan:    Lab Results   Component Value Date    HGBA1C 9.2 (H) 09/25/2023     Hemoglobin A1c more elevated. He can no longer afford the higher dose of the Invokana because of the Medicare donut hole. Discussed options with him. Will increase the glimepiride as noted above. Discussed potential risks for this. Discussed watching for any hypoglycemic episodes. We will have him take half of the [address]. Watch diet much more closely which he admits is an issue for him. Discussed carbohydrate intake. Follow-up with ophthalmology as scheduled in January. Check feet daily. Orders:  -     CBC and differential; Future  -     Comprehensive metabolic panel; Future  -     Hemoglobin A1C; Future    2. Mixed hyperlipidemia  Assessment & Plan:  Cholesterol significantly more elevated. He feels this is dietary since he has been taking the pravastatin regularly. Wishes to try dietary changes prior to either increasing the dose of the pravastatin or changing to a stronger statin medication. Dietary changes discussed. Orders:  -     CBC and differential; Future  -     Comprehensive metabolic panel; Future  -     Lipid panel; Future  -     TSH, 3rd generation; Future    3. Erythrocytosis  Assessment & Plan:  Continues with the erythrocytosis. Somewhat higher. White blood cell count has returned to normal.  Discussed possibly following up with hematology if this persists. Discussed potential causes for this. He was a long-term smoker but has stopped smoking for a few years, however. Orders:  -     CBC and differential; Future    4. Vitamin D deficiency  Assessment & Plan:  Continue vitamin D supplementation. We will continue to follow vitamin D level with routine laboratory testing. Orders:  -     CBC and differential; Future    5.  Primary osteoarthritis involving multiple joints  Assessment & Plan:  Discussed watching for any worsening of symptoms. Discussed over-the-counter options to help with pain. Orders:  -     CBC and differential; Future    6. History of smoking 30 or more pack years  -     CT lung screening program; Future; Expected date: 10/04/2023    7. Screening for prostate cancer  -     PSA, Total Screen; Future    8. Type 2 diabetes mellitus with hyperglycemia, with long-term current use of insulin (HCC)  -     Canagliflozin (Invokana) 300 MG TABS; Take 1 tablet (300 mg total) by mouth daily before breakfast  -     glimepiride (AMARYL) 2 mg tablet; Take 1 tablet (2 mg total) by mouth 2 (two) times a day    9. Need for influenza vaccination  -     influenza vaccine, high-dose, PF 0.7 mL (FLUZONE HIGH-DOSE)      Orders and recommendations as noted above. Medication changes as noted above. Discussed Medicare donut hole. Discussed goal of hopefully eventually adding medication such as Ozempic but this would be cost prohibitive at this point. Continue with lisinopril as previously. Shot as noted above. Discussed the COVID booster as well as the RSV vaccination. Up-to-date on colonoscopy. Will be due for PSA with next laboratory testing. Have him follow-up in about 3 to 5 months or sooner if needed. Depression Screening and Follow-up Plan: Patient was screened for depression during today's encounter. They screened negative with a PHQ-2 score of 0. Lung Cancer Screening Shared Decision Making: I discussed with him that he is a candidate for lung cancer CT screening. The following Shared Decision-Making points were covered:  1. Benefits of screening were discussed, including the rates of reduction in death from lung cancer and other causes. Harms of screening were reviewed, including false positive tests, radiation exposure levels, risks of invasive procedures, risks of complications of screening, and risk of overdiagnosis.   2. I counseled on the importance of adherence to annual lung cancer LDCT screening, impact of co-morbidities, and ability or willingness to undergo diagnosis and treatment. 3. I counseled on the importance of maintaining abstinence as a former smoker or was counseled on the importance of smoking cessation if a current smoker    Review of Eligibility Criteria: He meets all of the criteria for Lung Cancer Screening.   - He is 74 y.o.   - He has 20 pack year tobacco history and is a current smoker or has quit within the past 15 years  - He presents no signs or symptoms of lung cancer    After discussion, the patient decided to elect lung cancer screening. Subjective:      Patient ID: Cassidy Rush is a 74 y.o. male. He presents for routine follow-up. Has generally been doing relatively well. He has concerns regarding his medications because of the cost and now falling into the donut hole. The Invokana at this current dosage would be cost prohibitive with his next refill. He is asking about other options. Continues with the metformin as well as the Amaryl in addition to the [address]. Has been tolerating these without difficulty. Denies any hypoglycemic episodes. Does not check his blood sugars regularly but usually blood sugars are in the upper 100s to above 200. States that he does not feel well if his blood sugars are less than 150 at this point but that has not been happening. Admits that he has been eating poorly. Eating more sweets and carbohydrates overall. Tolerating lisinopril without difficulty. Denies any headaches or localized weakness. Continues with the pravastatin. Has been taking this regularly. Denies any significant muscle aches or weakness with this. Denies any chest pain or palpitations. Denies any significant shortness of breath. Does have some joint symptoms especially into the shoulders at times.     The following portions of the patient's history were reviewed and updated as appropriate:   He  has a past medical history of Diabetes mellitus ([address]), Hyperlipidemia, Hypertension, Pancreatitis, Pancreatitis, and Tobacco abuse (6/14/2016). He   Patient Active Problem List    Diagnosis Date Noted   • Erythrocytosis 10/04/2023   • Allergic rhinitis 04/20/2020   • Chronic sinusitis 12/10/2019   • Leukocytosis 12/10/2019   • Muscle weakness 04/02/2019   • Erectile dysfunction 04/02/2019   • Primary osteoarthritis involving multiple joints 04/02/2019   • Vitamin D deficiency 04/01/2019   • Hyponatremia 06/16/2016   • Type 2 diabetes mellitus with hyperglycemia, without long-term current use of insulin ([address]) 06/14/2016   • Hyperlipidemia 06/14/2016     He  has a past surgical history that includes Ankle surgery and Incision and drainage of wound (Right, 6/15/2016). His family history includes Dementia in his father; Diabetes in his mother. He  reports that he quit smoking about 3 years ago. His smoking use included cigarettes. He has a 40.00 pack-year smoking history. He has never used smokeless tobacco. He reports that he does not drink alcohol and does not use drugs. Current Outpatient Medications   Medication Sig Dispense Refill   • Canagliflozin (Invokana) 300 MG TABS Take 1 tablet (300 mg total) by mouth daily before breakfast 90 tablet 3   • ergocalciferol (VITAMIN D2) 50,000 units Take 1 capsule (50,000 Units total) by mouth every 14 (fourteen) days 6 capsule 3   • glimepiride (AMARYL) 2 mg tablet Take 1 tablet (2 mg total) by mouth 2 (two) times a day 180 tablet 1   • lisinopril (ZESTRIL) 2.5 mg tablet TAKE 1 TABLET BY MOUTH EVERY DAY 90 tablet 1   • metFORMIN (GLUCOPHAGE) 850 mg tablet TAKE 1 TABLET BY MOUTH 3 TIMES A DAY.  270 tablet 1   • pravastatin (PRAVACHOL) 20 mg tablet TAKE 1 TABLET BY MOUTH EVERY DAY 90 tablet 1   • tadalafil (CIALIS) 5 MG tablet Take 1 tablet (5 mg total) by mouth daily as needed for erectile dysfunction 10 tablet 3     No current facility-administered medications for this visit. Current Outpatient Medications on File Prior to Visit   Medication Sig   • ergocalciferol (VITAMIN D2) 50,000 units Take 1 capsule (50,000 Units total) by mouth every 14 (fourteen) days   • lisinopril (ZESTRIL) 2.5 mg tablet TAKE 1 TABLET BY MOUTH EVERY DAY   • metFORMIN (GLUCOPHAGE) 850 mg tablet TAKE 1 TABLET BY MOUTH 3 TIMES A DAY. • pravastatin (PRAVACHOL) 20 mg tablet TAKE 1 TABLET BY MOUTH EVERY DAY   • tadalafil (CIALIS) 5 MG tablet Take 1 tablet (5 mg total) by mouth daily as needed for erectile dysfunction   • [DISCONTINUED] Canagliflozin (Invokana) 300 MG TABS Take 1 tablet (300 mg total) by mouth daily before breakfast   • [DISCONTINUED] glimepiride (AMARYL) 2 mg tablet TAKE 1 TABLET BY MOUTH EVERY DAY WITH BREAKFAST     No current facility-administered medications on file prior to visit. He is allergic to penicillins. .    Review of Systems   Constitutional: Positive for appetite change. Negative for activity change, chills and fever. HENT: Negative for ear pain, hearing loss and sore throat. Eyes: Negative for pain and visual disturbance. Respiratory: Negative for cough, chest tightness and shortness of breath. Cardiovascular: Negative for chest pain and palpitations. Gastrointestinal: Negative for abdominal pain, blood in stool, diarrhea, nausea and vomiting. Endocrine: Negative for polydipsia, polyphagia and polyuria. As per HPI   Genitourinary: Negative for dysuria and hematuria. Musculoskeletal: Positive for arthralgias. Negative for back pain and gait problem. Skin: Negative for color change and rash. Neurological: Negative for dizziness, seizures, syncope and headaches. Hematological: Does not bruise/bleed easily. Psychiatric/Behavioral: Negative for behavioral problems. The patient is not nervous/anxious. All other systems reviewed and are negative.         Objective:  /80 (BP Location: Left arm, Patient Position: Sitting, Cuff Size: Large)   Pulse 100   Temp 98.1 °F (36.7 °C)   Ht 5' 8" (1.727 m)   Wt 72.8 kg (160 lb 8 oz)   SpO2 99%   BMI 24.40 kg/m²          Physical Exam  Vitals and nursing note reviewed. Constitutional:       General: He is not in acute distress. Appearance: He is well-developed and well-groomed. HENT:      Head: Normocephalic and atraumatic. Nose: Nose normal.   Eyes:      Conjunctiva/sclera: Conjunctivae normal.      Pupils: Pupils are equal, round, and reactive to light. Cardiovascular:      Rate and Rhythm: Normal rate and regular rhythm. Heart sounds: Normal heart sounds. No murmur heard. No friction rub. No gallop. Pulmonary:      Breath sounds: Decreased breath sounds present. No wheezing or rales. Chest:      Chest wall: No tenderness. Abdominal:      General: There is no distension. Tenderness: There is no abdominal tenderness. There is no guarding or rebound. Lymphadenopathy:      Cervical: No cervical adenopathy. Skin:     General: Skin is warm and dry. Neurological:      Mental Status: He is alert and oriented to person, place, and time. Psychiatric:         Mood and Affect: Mood and affect normal.         Speech: Speech normal.         Behavior: Behavior normal. Behavior is cooperative. Cognition and Memory: Cognition and memory normal.       Below is the patient's most recent value for Albumin, ALT, AST, BUN, Calcium, Chloride, Cholesterol, CO2, Creatinine, GFR, Glucose, HDL, Hematocrit, Hemoglobin, Hemoglobin A1C, LDL, Magnesium, Phosphorus, Platelets, Potassium, PSA, Sodium, Triglycerides, and WBC.    Lab Results   Component Value Date    ALT 20 09/25/2023    AST 19 09/25/2023    BUN 13 09/25/2023    CALCIUM 10.4 (H) 09/25/2023    CL 97 09/25/2023    CHOL 138 03/11/2015    CO2 28 09/25/2023    CREATININE 0.79 09/25/2023    HDL 59 09/25/2023    HCT 54.4 (H) 09/25/2023    HGB 18.4 (H) 09/25/2023    HGBA1C 9.2 (H) 09/25/2023    MG 1.7 06/16/2016    PHOS 2.7 06/16/2016     09/25/2023    K 5.3 09/25/2023    PSA 0.6 12/29/2022     08/23/2015    TRIG 184 (H) 09/25/2023    WBC 9.32 09/25/2023     Note: for a comprehensive list of the patient's lab results, access the Results Review activity.

## 2023-10-05 NOTE — ASSESSMENT & PLAN NOTE
Lab Results   Component Value Date    HGBA1C 9.2 (H) 09/25/2023     Hemoglobin A1c more elevated. He can no longer afford the higher dose of the Invokana because of the Medicare donut hole. Discussed options with him. Will increase the glimepiride as noted above. Discussed potential risks for this. Discussed watching for any hypoglycemic episodes. We will have him take half of the [address]. Watch diet much more closely which he admits is an issue for him. Discussed carbohydrate intake. Follow-up with ophthalmology as scheduled in January. Check feet daily.

## 2023-10-05 NOTE — ASSESSMENT & PLAN NOTE
Discussed watching for any worsening of symptoms. Discussed over-the-counter options to help with pain.

## 2023-10-05 NOTE — ASSESSMENT & PLAN NOTE
Cholesterol significantly more elevated. He feels this is dietary since he has been taking the pravastatin regularly. Wishes to try dietary changes prior to either increasing the dose of the pravastatin or changing to a stronger statin medication. Dietary changes discussed.

## 2023-10-05 NOTE — ASSESSMENT & PLAN NOTE
Continues with the erythrocytosis. Somewhat higher. White blood cell count has returned to normal.  Discussed possibly following up with hematology if this persists. Discussed potential causes for this. He was a long-term smoker but has stopped smoking for a few years, however.

## 2023-10-05 NOTE — ASSESSMENT & PLAN NOTE
Continue vitamin D supplementation. We will continue to follow vitamin D level with routine laboratory testing.

## 2023-11-03 DIAGNOSIS — E11.65 TYPE 2 DIABETES MELLITUS WITH HYPERGLYCEMIA, UNSPECIFIED WHETHER LONG TERM INSULIN USE (HCC): ICD-10-CM

## 2023-11-03 RX ORDER — LISINOPRIL 2.5 MG/1
TABLET ORAL
Qty: 90 TABLET | Refills: 1 | Status: SHIPPED | OUTPATIENT
Start: 2023-11-03

## 2024-01-01 ENCOUNTER — APPOINTMENT (EMERGENCY)
Dept: CT IMAGING | Facility: HOSPITAL | Age: 69
DRG: 439 | End: 2024-01-01
Payer: COMMERCIAL

## 2024-01-01 ENCOUNTER — HOSPITAL ENCOUNTER (INPATIENT)
Facility: HOSPITAL | Age: 69
LOS: 2 days | Discharge: HOME | DRG: 439 | End: 2024-01-03
Attending: EMERGENCY MEDICINE | Admitting: INTERNAL MEDICINE
Payer: COMMERCIAL

## 2024-01-01 ENCOUNTER — APPOINTMENT (INPATIENT)
Dept: CT IMAGING | Facility: HOSPITAL | Age: 69
DRG: 439 | End: 2024-01-01
Payer: COMMERCIAL

## 2024-01-01 DIAGNOSIS — K85.90 PANCREATITIS: Primary | ICD-10-CM

## 2024-01-01 PROBLEM — K83.09 ACUTE CHOLANGITIS: Status: ACTIVE | Noted: 2024-01-01

## 2024-01-01 PROBLEM — J98.4 SCARRING OF LUNG: Status: ACTIVE | Noted: 2024-01-01

## 2024-01-01 LAB
ALBUMIN SERPL BCP-MCNC: 4 G/DL (ref 3.5–5)
ALP SERPL-CCNC: 76 U/L (ref 34–104)
ALT SERPL W P-5'-P-CCNC: 10 U/L (ref 7–52)
ANION GAP SERPL CALCULATED.3IONS-SCNC: 14 MMOL/L
AST SERPL W P-5'-P-CCNC: 11 U/L (ref 13–39)
BASOPHILS # BLD AUTO: 0.04 THOUSANDS/ÂΜL (ref 0–0.1)
BASOPHILS NFR BLD AUTO: 0 % (ref 0–1)
BILIRUB SERPL-MCNC: 1.47 MG/DL (ref 0.2–1)
BUN SERPL-MCNC: 12 MG/DL (ref 5–25)
CALCIUM SERPL-MCNC: 9.3 MG/DL (ref 8.4–10.2)
CHLORIDE SERPL-SCNC: 97 MMOL/L (ref 96–108)
CHOLEST SERPL-MCNC: 210 MG/DL
CO2 SERPL-SCNC: 23 MMOL/L (ref 21–32)
CREAT SERPL-MCNC: 0.66 MG/DL (ref 0.6–1.3)
EOSINOPHIL # BLD AUTO: 0.12 THOUSAND/ÂΜL (ref 0–0.61)
EOSINOPHIL NFR BLD AUTO: 1 % (ref 0–6)
ERYTHROCYTE [DISTWIDTH] IN BLOOD BY AUTOMATED COUNT: 11.5 % (ref 11.6–15.1)
FLUAV RNA RESP QL NAA+PROBE: NEGATIVE
FLUBV RNA RESP QL NAA+PROBE: NEGATIVE
GFR SERPL CREATININE-BSD FRML MDRD: 100 ML/MIN/1.73SQ M
GLUCOSE SERPL-MCNC: 151 MG/DL (ref 65–140)
GLUCOSE SERPL-MCNC: 164 MG/DL (ref 65–140)
GLUCOSE SERPL-MCNC: 225 MG/DL (ref 65–140)
HCT VFR BLD AUTO: 52.6 % (ref 36.5–49.3)
HDLC SERPL-MCNC: 49 MG/DL
HGB BLD-MCNC: 18 G/DL (ref 12–17)
IMM GRANULOCYTES # BLD AUTO: 0.03 THOUSAND/UL (ref 0–0.2)
IMM GRANULOCYTES NFR BLD AUTO: 0 % (ref 0–2)
LDLC SERPL CALC-MCNC: 106 MG/DL (ref 0–100)
LIPASE SERPL-CCNC: 406 U/L (ref 11–82)
LYMPHOCYTES # BLD AUTO: 2.61 THOUSANDS/ÂΜL (ref 0.6–4.47)
LYMPHOCYTES NFR BLD AUTO: 22 % (ref 14–44)
MCH RBC QN AUTO: 30.1 PG (ref 26.8–34.3)
MCHC RBC AUTO-ENTMCNC: 34.2 G/DL (ref 31.4–37.4)
MCV RBC AUTO: 88 FL (ref 82–98)
MONOCYTES # BLD AUTO: 0.84 THOUSAND/ÂΜL (ref 0.17–1.22)
MONOCYTES NFR BLD AUTO: 7 % (ref 4–12)
NEUTROPHILS # BLD AUTO: 8.51 THOUSANDS/ÂΜL (ref 1.85–7.62)
NEUTS SEG NFR BLD AUTO: 70 % (ref 43–75)
NONHDLC SERPL-MCNC: 161 MG/DL
NRBC BLD AUTO-RTO: 0 /100 WBCS
PLATELET # BLD AUTO: 290 THOUSANDS/UL (ref 149–390)
PMV BLD AUTO: 9.2 FL (ref 8.9–12.7)
POTASSIUM SERPL-SCNC: 4.2 MMOL/L (ref 3.5–5.3)
PROT SERPL-MCNC: 7.8 G/DL (ref 6.4–8.4)
RBC # BLD AUTO: 5.98 MILLION/UL (ref 3.88–5.62)
RSV RNA RESP QL NAA+PROBE: NEGATIVE
SARS-COV-2 RNA RESP QL NAA+PROBE: NEGATIVE
SODIUM SERPL-SCNC: 134 MMOL/L (ref 135–147)
TRIGL SERPL-MCNC: 277 MG/DL
WBC # BLD AUTO: 12.15 THOUSAND/UL (ref 4.31–10.16)

## 2024-01-01 PROCEDURE — 82948 REAGENT STRIP/BLOOD GLUCOSE: CPT

## 2024-01-01 PROCEDURE — 99285 EMERGENCY DEPT VISIT HI MDM: CPT | Performed by: EMERGENCY MEDICINE

## 2024-01-01 PROCEDURE — 96374 THER/PROPH/DIAG INJ IV PUSH: CPT

## 2024-01-01 PROCEDURE — 71250 CT THORAX DX C-: CPT

## 2024-01-01 PROCEDURE — 99223 1ST HOSP IP/OBS HIGH 75: CPT | Performed by: INTERNAL MEDICINE

## 2024-01-01 PROCEDURE — 0241U HB NFCT DS VIR RESP RNA 4 TRGT: CPT

## 2024-01-01 PROCEDURE — 36415 COLL VENOUS BLD VENIPUNCTURE: CPT

## 2024-01-01 PROCEDURE — 83690 ASSAY OF LIPASE: CPT

## 2024-01-01 PROCEDURE — 85025 COMPLETE CBC W/AUTO DIFF WBC: CPT

## 2024-01-01 PROCEDURE — 80053 COMPREHEN METABOLIC PANEL: CPT

## 2024-01-01 PROCEDURE — G1004 CDSM NDSC: HCPCS

## 2024-01-01 PROCEDURE — 74177 CT ABD & PELVIS W/CONTRAST: CPT

## 2024-01-01 PROCEDURE — 80061 LIPID PANEL: CPT | Performed by: INTERNAL MEDICINE

## 2024-01-01 PROCEDURE — 99285 EMERGENCY DEPT VISIT HI MDM: CPT

## 2024-01-01 RX ORDER — INSULIN LISPRO 100 [IU]/ML
1-5 INJECTION, SOLUTION INTRAVENOUS; SUBCUTANEOUS
Status: DISCONTINUED | OUTPATIENT
Start: 2024-01-01 | End: 2024-01-03 | Stop reason: HOSPADM

## 2024-01-01 RX ORDER — PRAVASTATIN SODIUM 20 MG
20 TABLET ORAL DAILY
Status: DISCONTINUED | OUTPATIENT
Start: 2024-01-01 | End: 2024-01-03 | Stop reason: HOSPADM

## 2024-01-01 RX ORDER — SODIUM CHLORIDE, SODIUM LACTATE, POTASSIUM CHLORIDE, CALCIUM CHLORIDE 600; 310; 30; 20 MG/100ML; MG/100ML; MG/100ML; MG/100ML
125 INJECTION, SOLUTION INTRAVENOUS CONTINUOUS
Status: DISCONTINUED | OUTPATIENT
Start: 2024-01-01 | End: 2024-01-03 | Stop reason: HOSPADM

## 2024-01-01 RX ORDER — ENOXAPARIN SODIUM 100 MG/ML
40 INJECTION SUBCUTANEOUS DAILY
Status: DISCONTINUED | OUTPATIENT
Start: 2024-01-01 | End: 2024-01-03 | Stop reason: HOSPADM

## 2024-01-01 RX ORDER — MORPHINE SULFATE 4 MG/ML
4 INJECTION, SOLUTION INTRAMUSCULAR; INTRAVENOUS ONCE
Status: COMPLETED | OUTPATIENT
Start: 2024-01-01 | End: 2024-01-01

## 2024-01-01 RX ORDER — ACETAMINOPHEN 325 MG/1
650 TABLET ORAL EVERY 6 HOURS PRN
Status: DISCONTINUED | OUTPATIENT
Start: 2024-01-01 | End: 2024-01-03 | Stop reason: HOSPADM

## 2024-01-01 RX ORDER — CEFTRIAXONE 1 G/50ML
1000 INJECTION, SOLUTION INTRAVENOUS EVERY 24 HOURS
Status: DISCONTINUED | OUTPATIENT
Start: 2024-01-01 | End: 2024-01-02

## 2024-01-01 RX ORDER — ONDANSETRON 2 MG/ML
4 INJECTION INTRAMUSCULAR; INTRAVENOUS EVERY 8 HOURS PRN
Status: DISCONTINUED | OUTPATIENT
Start: 2024-01-01 | End: 2024-01-03 | Stop reason: HOSPADM

## 2024-01-01 RX ORDER — MORPHINE SULFATE 4 MG/ML
4 INJECTION, SOLUTION INTRAMUSCULAR; INTRAVENOUS EVERY 4 HOURS PRN
Status: DISCONTINUED | OUTPATIENT
Start: 2024-01-01 | End: 2024-01-03 | Stop reason: HOSPADM

## 2024-01-01 RX ORDER — OXYCODONE HYDROCHLORIDE 5 MG/1
5 TABLET ORAL EVERY 4 HOURS PRN
Status: DISCONTINUED | OUTPATIENT
Start: 2024-01-01 | End: 2024-01-03 | Stop reason: HOSPADM

## 2024-01-01 RX ORDER — LISINOPRIL 2.5 MG/1
2.5 TABLET ORAL DAILY
Status: DISCONTINUED | OUTPATIENT
Start: 2024-01-01 | End: 2024-01-03 | Stop reason: HOSPADM

## 2024-01-01 RX ADMIN — MORPHINE SULFATE 4 MG: 4 INJECTION, SOLUTION INTRAMUSCULAR; INTRAVENOUS at 14:15

## 2024-01-01 RX ADMIN — INSULIN LISPRO 1 UNITS: 100 INJECTION, SOLUTION INTRAVENOUS; SUBCUTANEOUS at 21:41

## 2024-01-01 RX ADMIN — IOHEXOL 100 ML: 350 INJECTION, SOLUTION INTRAVENOUS at 12:19

## 2024-01-01 RX ADMIN — SODIUM CHLORIDE, SODIUM LACTATE, POTASSIUM CHLORIDE, AND CALCIUM CHLORIDE 125 ML/HR: .6; .31; .03; .02 INJECTION, SOLUTION INTRAVENOUS at 16:01

## 2024-01-01 RX ADMIN — ENOXAPARIN SODIUM 40 MG: 40 INJECTION SUBCUTANEOUS at 15:58

## 2024-01-01 RX ADMIN — CEFTRIAXONE 1000 MG: 1 INJECTION, SOLUTION INTRAVENOUS at 16:39

## 2024-01-01 RX ADMIN — SODIUM CHLORIDE 1000 ML: 0.9 INJECTION, SOLUTION INTRAVENOUS at 14:16

## 2024-01-01 RX ADMIN — PRAVASTATIN SODIUM 20 MG: 20 TABLET ORAL at 15:57

## 2024-01-01 RX ADMIN — INSULIN LISPRO 1 UNITS: 100 INJECTION, SOLUTION INTRAVENOUS; SUBCUTANEOUS at 17:15

## 2024-01-01 NOTE — ASSESSMENT & PLAN NOTE
"Lab Results   Component Value Date    HGBA1C 9.2 (H) 09/25/2023       No results for input(s): \"POCGLU\" in the last 72 hours.    Blood Sugar Average: Last 72 hrs:    Will hold home medications at this time  Continue lisinopril 2.5 mg daily  Begin Accu-Cheks, corrective sliding scale insulin, and hypoglycemic protocol  Diabetic clear liquid diet ordered    "

## 2024-01-01 NOTE — ED PROVIDER NOTES
History  Chief Complaint   Patient presents with    Abdominal Pain     68-year-old male presents ER for evaluation of abdominal pain. PMH: Diabetes mellitus, hyperlipidemia, hypertension, pancreatitis.  Patient stated that he has been having approximately 1 week of abdominal pain.  Patient stated that initially his abdominal pain was in the upper right and left quadrant and now has become more generalized.  Patient stated that he was having some nausea.  Patient states that he does not have any vomiting or diarrhea.  Patient stated that he has been intermittently having constipation/diarrhea over the last couple months however has had a normal bowel movement recently.  Patient denies any recent travel, suspicious food intake, sick contacts.  Denies chest pain, shortness of breath, dizziness or syncope.  No noted flank pain, hematuria or UTI symptoms.        History provided by:  Patient      Prior to Admission Medications   Prescriptions Last Dose Informant Patient Reported? Taking?   Canagliflozin (Invokana) 300 MG TABS   No No   Sig: Take 1 tablet (300 mg total) by mouth daily before breakfast   ergocalciferol (VITAMIN D2) 50,000 units   No No   Sig: Take 1 capsule (50,000 Units total) by mouth every 14 (fourteen) days   glimepiride (AMARYL) 2 mg tablet   No No   Sig: Take 1 tablet (2 mg total) by mouth 2 (two) times a day   lisinopril (ZESTRIL) 2.5 mg tablet   No No   Sig: TAKE 1 TABLET BY MOUTH EVERY DAY   metFORMIN (GLUCOPHAGE) 850 mg tablet   No No   Sig: TAKE 1 TABLET BY MOUTH THREE TIMES A DAY   pravastatin (PRAVACHOL) 20 mg tablet   No No   Sig: TAKE 1 TABLET BY MOUTH EVERY DAY   tadalafil (CIALIS) 5 MG tablet   No No   Sig: Take 1 tablet (5 mg total) by mouth daily as needed for erectile dysfunction      Facility-Administered Medications: None       Past Medical History:   Diagnosis Date    Diabetes mellitus (HCC)     Hyperlipidemia     Hypertension     Pancreatitis     Pancreatitis     Tobacco abuse  2016       Past Surgical History:   Procedure Laterality Date    ANKLE SURGERY      INCISION AND DRAINAGE OF WOUND Right 6/15/2016    Procedure: INCISION AND DRAINAGE (I&D) EXTREMITY;  Surgeon: Andrew Israel MD;  Location: [facility];  Service:        Family History   Problem Relation Age of Onset    Diabetes Mother     Dementia Father      I have reviewed and agree with the history as documented.    E-Cigarette/Vaping    E-Cigarette Use Never User      E-Cigarette/Vaping Substances     Social History     Tobacco Use    Smoking status: Former     Current packs/day: 0.00     Average packs/day: 2.0 packs/day for 20.0 years (40.0 ttl pk-yrs)     Types: Cigarettes     Start date:      Quit date:      Years since quittin.0    Smokeless tobacco: Never   Vaping Use    Vaping status: Never Used   Substance Use Topics    Alcohol use: No    Drug use: No       Review of Systems   Constitutional:  Negative for chills and fever.   HENT:  Negative for ear pain and sore throat.    Eyes:  Negative for pain and visual disturbance.   Respiratory:  Negative for cough and shortness of breath.    Cardiovascular:  Negative for chest pain and palpitations.   Gastrointestinal:  Positive for abdominal pain and nausea. Negative for constipation and vomiting.   Genitourinary:  Negative for dysuria and hematuria.   Musculoskeletal:  Negative for arthralgias and back pain.   Skin:  Negative for color change and rash.   Neurological:  Negative for dizziness, seizures, syncope and headaches.   All other systems reviewed and are negative.      Physical Exam  Physical Exam  Vitals and nursing note reviewed.   Constitutional:       General: He is not in acute distress.     Appearance: He is well-developed.   HENT:      Head: Normocephalic and atraumatic.      Mouth/Throat:      Mouth: Mucous membranes are dry.   Eyes:      Conjunctiva/sclera: Conjunctivae normal.   Cardiovascular:      Rate and Rhythm: Normal rate and regular  rhythm.      Heart sounds: Normal heart sounds.   Pulmonary:      Effort: Pulmonary effort is normal. No respiratory distress.      Breath sounds: Normal breath sounds.   Abdominal:      General: There is no distension.      Palpations: Abdomen is soft.      Tenderness: There is abdominal tenderness in the right upper quadrant and left upper quadrant. There is no right CVA tenderness or left CVA tenderness.   Musculoskeletal:         General: No swelling. Normal range of motion.      Cervical back: Neck supple.   Skin:     General: Skin is warm and dry.      Capillary Refill: Capillary refill takes less than 2 seconds.   Neurological:      Mental Status: He is alert and oriented to person, place, and time.   Psychiatric:         Mood and Affect: Mood normal.         Behavior: Behavior normal.         Vital Signs  ED Triage Vitals [01/01/24 1039]   Temperature Pulse Respirations Blood Pressure SpO2   98.7 °F (37.1 °C) (!) 113 18 132/94 96 %      Temp Source Heart Rate Source Patient Position - Orthostatic VS BP Location FiO2 (%)   Temporal Monitor Sitting Left arm --      Pain Score       9           Vitals:    01/01/24 1039   BP: 132/94   Pulse: (!) 113   Patient Position - Orthostatic VS: Sitting         Visual Acuity      ED Medications  Medications   lisinopril (ZESTRIL) tablet 2.5 mg (2.5 mg Oral Not Given 1/1/24 1546)   pravastatin (PRAVACHOL) tablet 20 mg (20 mg Oral Given 1/1/24 1557)   lactated ringers infusion (125 mL/hr Intravenous New Bag 1/1/24 1601)   acetaminophen (TYLENOL) tablet 650 mg (has no administration in time range)   enoxaparin (LOVENOX) subcutaneous injection 40 mg (40 mg Subcutaneous Given 1/1/24 1558)   insulin lispro (HumaLOG) 100 units/mL subcutaneous injection 1-5 Units (1 Units Subcutaneous Given 1/1/24 1715)   insulin lispro (HumaLOG) 100 units/mL subcutaneous injection 1-5 Units (has no administration in time range)   ondansetron (ZOFRAN) injection 4 mg (has no administration in time  range)   oxyCODONE (ROXICODONE) IR tablet 5 mg (has no administration in time range)   morphine injection 4 mg (has no administration in time range)   cefTRIAXone (ROCEPHIN) IVPB (premix in dextrose) 1,000 mg 50 mL (1,000 mg Intravenous New Bag 1/1/24 1639)   iohexol (OMNIPAQUE) 350 MG/ML injection (MULTI-DOSE) 100 mL (100 mL Intravenous Given 1/1/24 1219)   sodium chloride 0.9 % bolus 1,000 mL (0 mL Intravenous Stopped 1/1/24 1718)   morphine injection 4 mg (4 mg Intravenous Given 1/1/24 1415)       Diagnostic Studies  Results Reviewed       Procedure Component Value Units Date/Time    Fingerstick Glucose (POCT) [[id number]]  (Abnormal) Collected: 01/01/24 1642    Lab Status: Final result Updated: 01/01/24 1643     POC Glucose 151 mg/dl     Lipid panel [[id number]]  (Abnormal) Collected: 01/01/24 1117    Lab Status: Final result Specimen: Blood from Arm, Left Updated: 01/01/24 1557     Cholesterol 210 mg/dL      Triglycerides 277 mg/dL      HDL, Direct 49 mg/dL      LDL Calculated 106 mg/dL      Non-HDL-Chol (CHOL-HDL) 161 mg/dl     Hemoglobin A1c w/EAG Estimation (Orders if not completed within the last 90 days) [[id number]]     Lab Status: No result Specimen: Blood     FLU/RSV/COVID - if FLU/RSV clinically relevant [[id number]]  (Normal) Collected: 01/01/24 1144    Lab Status: Final result Specimen: Nares from Nasopharyngeal Swab Updated: 01/01/24 1227     SARS-CoV-2 Negative     INFLUENZA A PCR Negative     INFLUENZA B PCR Negative     RSV PCR Negative    Narrative:      FOR PEDIATRIC PATIENTS - copy/paste COVID Guidelines URL to browser: [email or web address]    SARS-CoV-2 assay is a Nucleic Acid Amplification assay intended for the  qualitative detection of nucleic acid from SARS-CoV-2 in nasopharyngeal  swabs. Results are for the presumptive identification of SARS-CoV-2 RNA.    Positive results are indicative of infection with SARS-CoV-2, the virus  causing  COVID-19, but do not rule out bacterial infection or co-infection  with other viruses. Laboratories within the [place] and its  territories are required to report all positive results to the appropriate  public health authorities. Negative results do not preclude SARS-CoV-2  infection and should not be used as the sole basis for treatment or other  patient management decisions. Negative results must be combined with  clinical observations, patient history, and epidemiological information.  This test has not been FDA cleared or approved.    This test has been authorized by FDA under an Emergency Use Authorization  (EUA). This test is only authorized for the duration of time the  declaration that circumstances exist justifying the authorization of the  emergency use of an in vitro diagnostic tests for detection of SARS-CoV-2  virus and/or diagnosis of COVID-19 infection under section 564(b)(1) of  the Act, 21 U.S.C. 360bbb-3(b)(1), unless the authorization is terminated  or revoked sooner. The test has been validated but independent review by FDA  and CLIA is pending.    Test performed using [employer]: This RT-PCR assay targets N2,  a region unique to SARS-CoV-2. A conserved region in the E-gene was chosen  for pan-Sarbecovirus detection which includes SARS-CoV-2.    According to CMS-2020-01-R, this platform meets the definition of high-throughput technology.    Comprehensive metabolic panel [[id number]]  (Abnormal) Collected: 01/01/24 1117    Lab Status: Final result Specimen: Blood from Arm, Left Updated: 01/01/24 1148     Sodium 134 mmol/L      Potassium 4.2 mmol/L      Chloride 97 mmol/L      CO2 23 mmol/L      ANION GAP 14 mmol/L      BUN 12 mg/dL      Creatinine 0.66 mg/dL      Glucose 225 mg/dL      Calcium 9.3 mg/dL      AST 11 U/L      ALT 10 U/L      Alkaline Phosphatase 76 U/L      Total Protein 7.8 g/dL      Albumin 4.0 g/dL      Total Bilirubin 1.47 mg/dL      eGFR 100 ml/min/1.73sq m      Narrative:      National Kidney Disease Foundation guidelines for Chronic Kidney Disease (CKD):     Stage 1 with normal or high GFR (GFR > 90 mL/min/1.73 square meters)    Stage 2 Mild CKD (GFR = 60-89 mL/min/1.73 square meters)    Stage 3A Moderate CKD (GFR = 45-59 mL/min/1.73 square meters)    Stage 3B Moderate CKD (GFR = 30-44 mL/min/1.73 square meters)    Stage 4 Severe CKD (GFR = 15-29 mL/min/1.73 square meters)    Stage 5 End Stage CKD (GFR <15 mL/min/1.73 square meters)  Note: GFR calculation is accurate only with a steady state creatinine    Lipase [[id number]]  (Abnormal) Collected: 01/01/24 1117    Lab Status: Final result Specimen: Blood from Arm, Left Updated: 01/01/24 1148     Lipase 406 u/L     CBC and differential [[id number]]  (Abnormal) Collected: 01/01/24 1117    Lab Status: Final result Specimen: Blood from Arm, Left Updated: 01/01/24 1123     WBC 12.15 Thousand/uL      RBC 5.98 Million/uL      Hemoglobin 18.0 g/dL      Hematocrit 52.6 %      MCV 88 fL      MCH 30.1 pg      MCHC 34.2 g/dL      RDW 11.5 %      MPV 9.2 fL      Platelets 290 Thousands/uL      nRBC 0 /100 WBCs      Neutrophils Relative 70 %      Immat GRANS % 0 %      Lymphocytes Relative 22 %      Monocytes Relative 7 %      Eosinophils Relative 1 %      Basophils Relative 0 %      Neutrophils Absolute 8.51 Thousands/µL      Immature Grans Absolute 0.03 Thousand/uL      Lymphocytes Absolute 2.61 Thousands/µL      Monocytes Absolute 0.84 Thousand/µL      Eosinophils Absolute 0.12 Thousand/µL      Basophils Absolute 0.04 Thousands/µL                    CT abdomen pelvis with contrast   Final Result by Waqar Dacosta DO (01/01 1348)      1. Acute interstitial pancreatitis primarily involving the uncinate process with extension of inflammatory changes into the pancreaticoduodenal groove. No evidence of pancreatic ductal dilatation or organized peripancreatic fluid collection. Given that    these inflammatory changes are somewhat  focal, a short-term follow-up CT is recommended following improvement of symptoms to ensure resolution. Scattered parenchymal calcifications of the pancreas are in keeping with prior episodes of pancreatitis.      2. Wall thickening and inflammatory changes along the distal common bile duct, favored to be reactive to pancreatitis described above. Infectious cholangitis may demonstrate an identical appearance on CT, however, there is no biliary dilatation.    Clinical/laboratory correlation recommended.      3. Prominent liver, similar in appearance to prior exams. This is favored to be due in part to a Riedel's lobe (normal variant).      4. Mild linear opacities within the left lower lobe consistent with mild infectious/inflammatory process versus scarring. These are new from CT of December 2022. This can be reassessed on upcoming lung cancer screening CT.      5. Possible punctate nonobstructing calculus within the right kidney.      The study was marked in EPIC for immediate notification.      Workstation performed: [id number]         CT chest wo contrast    (Results Pending)              Procedures  Procedures         ED Course  ED Course as of 01/01/24 1742   Mon Jan 01, 2024   1423 Spoke with [facility] for admission                                SBIRT 20yo+      Flowsheet Row Most Recent Value   Initial Alcohol Screen: US AUDIT-C     1. How often do you have a drink containing alcohol? 0 Filed at: 01/01/2024 1040   2. How many drinks containing alcohol do you have on a typical day you are drinking?  0 Filed at: 01/01/2024 1040   3a. Male UNDER 65: How often do you have five or more drinks on one occasion? 0 Filed at: 01/01/2024 1040   3b. FEMALE Any Age, or MALE 65+: How often do you have 4 or more drinks on one occassion? 0 Filed at: 01/01/2024 1040   Audit-C Score 0 Filed at: 01/01/2024 1040   KEITH: How many times in the past year have you...    Used an illegal drug or used a prescription medication for  non-medical reasons? Never Filed at: 01/01/2024 1040                      Medical Decision Making  68-year-old male presents ER for evaluation of abdominal pain.  Abdominal pain without peritoneal signs.  No evidence of acute abdomen at this time.  Well-appearing.  Given workup, low suspicion for acute biliary disease, acute appendicitis, bowel obstruction, diverticulitis.  CBC shows a mild leukocytosis.  No elevation in hemoglobin.  Sodium 134, hyperglycemia.  Lipase elevated 406.  COVID/flu/RSV negative.  CT abdomen pelvis shows a acute pancreatitis.  Patient was given morphine and a liter of fluids.  Patient's case spoke with [facility] internal medicine and patient was accepted.  Patient and spouse agreed with further evaluation.    Amount and/or Complexity of Data Reviewed  Radiology: ordered.    Risk  Prescription drug management.  Decision regarding hospitalization.             Disposition  Final diagnoses:   Pancreatitis     Time reflects when diagnosis was documented in both MDM as applicable and the Disposition within this note       Time User Action Codes Description Comment    1/1/2024  2:08 PM Sheila Serrano Add [K85.90] Pancreatitis           ED Disposition       ED Disposition   Admit    Condition   Stable    Date/Time   Mon Jan 1, 2024 1432    Comment   Case was discussed with Helena Bernal and the patient's admission status was agreed to be Admission Status: inpatient status to the service of Dr. Bernal .                 Follow-up Information    None         Patient's Medications   Discharge Prescriptions    No medications on file       No discharge procedures on file.    PDMP Review       None            ED Provider  Electronically Signed by             MARY Garcia  01/01/24 7461

## 2024-01-01 NOTE — ASSESSMENT & PLAN NOTE
Denies any nausea or vomiting but does endorse right upper quadrant abdominal discomfort and difficulty lying on his right side due to pain  AST/ALT/ALP: 11/10/76  T Bili: 1.47  Imaging as above  Will begin IV Rocephin  General surgery consultation requested

## 2024-01-01 NOTE — ASSESSMENT & PLAN NOTE
Presented for evaluation of abdominal pain (epigastric and RUQ) with intermittent diarrhea and constipation  Prior history of pancreatitis in 2015  Patient and spouse both deny alcohol use  Lipase: 406  Trigs: 277  CT abd/pelvis (1/1/24): Acute interstitial pancreatitis primarily involving the uncinate process with extension of inflammatory changes into the pancreaticoduodenal groove. No evidence of pancreatic ductal dilatation or organized peripancreatic fluid collection. Scattered parenchymal calcifications of the pancreas are in keeping with prior episodes of pancreatitis. Wall thickening and inflammatory changes along the distal common bile duct, favored to be reactive to pancreatitis. Infectious cholangitis may demonstrate an identical appearance on CT.  CLD and IVF resuscitation initiated  Pain control and further supportive care  GI consultation requested

## 2024-01-01 NOTE — H&P
[facility]  H&P  Name: Toro Adames Jr. 68 y.o. male I MRN: [id number]  Unit/Bed#: ED 16 I Date of Admission: 1/1/2024   Date of Service: 1/1/2024 I Hospital Day: 0      Assessment/Plan   Acute pancreatitis  Assessment & Plan  Presented for evaluation of abdominal pain (epigastric and RUQ) with intermittent diarrhea and constipation  Prior history of pancreatitis in 2015  Patient and spouse both deny alcohol use  Lipase: 406  Trigs: 277  CT abd/pelvis (1/1/24): Acute interstitial pancreatitis primarily involving the uncinate process with extension of inflammatory changes into the pancreaticoduodenal groove. No evidence of pancreatic ductal dilatation or organized peripancreatic fluid collection. Scattered parenchymal calcifications of the pancreas are in keeping with prior episodes of pancreatitis. Wall thickening and inflammatory changes along the distal common bile duct, favored to be reactive to pancreatitis. Infectious cholangitis may demonstrate an identical appearance on CT.  CLD and IVF resuscitation initiated  Pain control and further supportive care  GI consultation requested    Acute cholangitis  Assessment & Plan  Denies any nausea or vomiting but does endorse right upper quadrant abdominal discomfort and difficulty lying on his right side due to pain  AST/ALT/ALP: 11/10/76  T Bili: 1.47  Imaging as above  Will begin IV Rocephin  General surgery consultation requested    Scarring of lung  Assessment & Plan  Incidental finding on CT abdomen/pelvis  Mild linear opacities within the left lower lobe consistent with mild infectious/inflammatory process versus scarring.   Prior history of smoking with cessation noted 4 years prior  We will check CT of the chest without contrast    Hyperlipidemia  Assessment & Plan  Continue home pravastatin 20 mg daily    Type 2 diabetes mellitus with hyperglycemia, without long-term current use of insulin (HCC)  Assessment & Plan  Lab Results  "  Component Value Date    HGBA1C 9.2 (H) 09/25/2023       No results for input(s): \"POCGLU\" in the last 72 hours.    Blood Sugar Average: Last 72 hrs:    Will hold home medications at this time  Continue lisinopril 2.5 mg daily  Begin Accu-Cheks, corrective sliding scale insulin, and hypoglycemic protocol  Diabetic clear liquid diet ordered           VTE Pharmacologic Prophylaxis: VTE Score: 3 Moderate Risk (Score 3-4) - Pharmacological DVT Prophylaxis Ordered: enoxaparin (Lovenox).  Code Status: Level 1 - Full Code   Discussion with family: Updated  (wife) at bedside.    Anticipated Length of Stay: Patient will be admitted on an inpatient basis with an anticipated length of stay of greater than 2 midnights secondary to acute pancreatitis.    Total Time Spent on Date of Encounter in care of patient: 65 mins. This time was spent on one or more of the following: performing physical exam; counseling and coordination of care; obtaining or reviewing history; documenting in the medical record; reviewing/ordering tests, medications or procedures; communicating with other healthcare professionals and discussing with patient's family/caregivers.    Chief Complaint: Abdominal pain    History of Present Illness:  Toro Adames Jr. is a 68 y.o. male with a PMH of DMII and HLD who presents with the complaint of abdominal discomfort.  Patient notes that GI symptoms began several weeks ago and have been intermittent since onset.  He endorses intermittent diarrhea and constipation with decreased appetite and 6 pound weight loss in the last couple of weeks.  Unfortunately on Friday he developed significant epigastric and right upper quadrant abdominal discomfort which has progressively been worsening and prompted his presentation this afternoon.  Patient has a prior history of pancreatitis in 2015.  Patient and wife both deny any alcohol use, history of gallbladder disease, or significant triglyceridemia.  In the " emergency department, CT of the abdomen and pelvis was performed with evidence of acute pancreatitis and possible infectious cholangitis and he was therefore admitted to the medical floor for further observation and management.    Review of Systems:  Review of Systems   Constitutional:  Positive for unexpected weight change. Negative for chills and fever.   HENT:  Negative for ear pain and sore throat.    Eyes:  Negative for pain and visual disturbance.   Respiratory:  Negative for cough and shortness of breath.    Cardiovascular:  Negative for chest pain and palpitations.   Gastrointestinal:  Positive for abdominal pain, constipation and diarrhea. Negative for nausea and vomiting.   Genitourinary:  Negative for dysuria and hematuria.   Musculoskeletal:  Negative for arthralgias and back pain.   Skin:  Negative for color change and rash.   Neurological:  Negative for dizziness, seizures and syncope.   Psychiatric/Behavioral:  Negative for agitation, confusion and hallucinations.    All other systems reviewed and are negative.      Past Medical and Surgical History:   Past Medical History:   Diagnosis Date    Diabetes mellitus (HCC)     Hyperlipidemia     Hypertension     Pancreatitis     Pancreatitis     Tobacco abuse 6/14/2016       Past Surgical History:   Procedure Laterality Date    ANKLE SURGERY      INCISION AND DRAINAGE OF WOUND Right 6/15/2016    Procedure: INCISION AND DRAINAGE (I&D) EXTREMITY;  Surgeon: Andrew Israel MD;  Location: [facility];  Service:        Meds/Allergies:  Prior to Admission medications    Medication Sig Start Date End Date Taking? Authorizing Provider   Canagliflozin (Invokana) 300 MG TABS Take 1 tablet (300 mg total) by mouth daily before breakfast 10/4/23   Alissa Chowdhury MD   ergocalciferol (VITAMIN D2) 50,000 units Take 1 capsule (50,000 Units total) by mouth every 14 (fourteen) days 5/3/23   Alissa Chowdhury MD   glimepiride (AMARYL) 2 mg tablet Take 1 tablet (2 mg total)  by mouth 2 (two) times a day 10/4/23   Alissa Chowdhury MD   lisinopril (ZESTRIL) 2.5 mg tablet TAKE 1 TABLET BY MOUTH EVERY DAY 11/3/23   Alissa Chowdhury MD   metFORMIN (GLUCOPHAGE) 850 mg tablet TAKE 1 TABLET BY MOUTH THREE TIMES A DAY 11/3/23   Alissa Chowdhury MD   pravastatin (PRAVACHOL) 20 mg tablet TAKE 1 TABLET BY MOUTH EVERY DAY 22   Alissa Chowdhury MD   tadalafil (CIALIS) 5 MG tablet Take 1 tablet (5 mg total) by mouth daily as needed for erectile dysfunction 5/3/23   Alissa Chowdhury MD     I have reviewed home medications with patient personally.    Allergies:   Allergies   Allergen Reactions    Penicillins        Social History:  Marital Status: /Civil Union   Patient Pre-hospital Living Situation: Home, With spouse  Patient Pre-hospital Level of Mobility: walks  Patient Pre-hospital Diet Restrictions: Diabetic    Substance Use History:   Social History     Substance and Sexual Activity   Alcohol Use No     Social History     Tobacco Use   Smoking Status Former    Current packs/day: 0.00    Average packs/day: 2.0 packs/day for 20.0 years (40.0 ttl pk-yrs)    Types: Cigarettes    Start date:     Quit date:     Years since quittin.0   Smokeless Tobacco Never     Social History     Substance and Sexual Activity   Drug Use No       Family History:  Family History   Problem Relation Age of Onset    Diabetes Mother     Dementia Father        Physical Exam:     Vitals:   Blood Pressure: 132/94 (24 103)  Pulse: (!) 113 (24)  Temperature: 98.7 °F (37.1 °C) (24)  Temp Source: Temporal (24)  Respirations: 18 (24)  Weight - Scale: 69.9 kg (154 lb) (24)  SpO2: 96 % (24)    Physical Exam  Vitals and nursing note reviewed.   Constitutional:       General: He is not in acute distress.     Appearance: Normal appearance.   HENT:      Head: Normocephalic and atraumatic.      Mouth/Throat:      Mouth: Mucous  membranes are dry.      Pharynx: Oropharynx is clear.   Eyes:      Extraocular Movements: Extraocular movements intact.      Conjunctiva/sclera: Conjunctivae normal.   Cardiovascular:      Rate and Rhythm: Normal rate and regular rhythm.      Pulses: Normal pulses.      Heart sounds: Normal heart sounds. No murmur heard.  Pulmonary:      Effort: Pulmonary effort is normal. No respiratory distress.      Breath sounds: Normal breath sounds. No wheezing.   Abdominal:      General: Bowel sounds are normal. There is no distension.      Palpations: Abdomen is soft.      Comments: Mild epigastric and right upper quadrant tenderness to palpation   Musculoskeletal:         General: Normal range of motion.      Cervical back: Normal range of motion and neck supple.   Skin:     General: Skin is warm and dry.   Neurological:      General: No focal deficit present.      Mental Status: He is alert and oriented to person, place, and time. Mental status is at baseline.   Psychiatric:         Mood and Affect: Mood normal.         Behavior: Behavior normal.         Judgment: Judgment normal.          Additional Data:     Lab Results:  Results from last 7 days   Lab Units 01/01/24  1117   WBC Thousand/uL 12.15*   HEMOGLOBIN g/dL 18.0*   HEMATOCRIT % 52.6*   PLATELETS Thousands/uL 290   NEUTROS PCT % 70   LYMPHS PCT % 22   MONOS PCT % 7   EOS PCT % 1     Results from last 7 days   Lab Units 01/01/24  1117   SODIUM mmol/L 134*   POTASSIUM mmol/L 4.2   CHLORIDE mmol/L 97   CO2 mmol/L 23   BUN mg/dL 12   CREATININE mg/dL 0.66   ANION GAP mmol/L 14   CALCIUM mg/dL 9.3   ALBUMIN g/dL 4.0   TOTAL BILIRUBIN mg/dL 1.47*   ALK PHOS U/L 76   ALT U/L 10   AST U/L 11*   GLUCOSE RANDOM mg/dL 225*                       Lines/Drains:  Invasive Devices       Peripheral Intravenous Line  Duration             Peripheral IV 01/01/24 Left Forearm <1 day                        Imaging: Reviewed radiology reports from this admission including:  abdominal/pelvic CT  CT abdomen pelvis with contrast   Final Result by Waqar Dacosta DO (01/01 6308)      1. Acute interstitial pancreatitis primarily involving the uncinate process with extension of inflammatory changes into the pancreaticoduodenal groove. No evidence of pancreatic ductal dilatation or organized peripancreatic fluid collection. Given that    these inflammatory changes are somewhat focal, a short-term follow-up CT is recommended following improvement of symptoms to ensure resolution. Scattered parenchymal calcifications of the pancreas are in keeping with prior episodes of pancreatitis.      2. Wall thickening and inflammatory changes along the distal common bile duct, favored to be reactive to pancreatitis described above. Infectious cholangitis may demonstrate an identical appearance on CT, however, there is no biliary dilatation.    Clinical/laboratory correlation recommended.      3. Prominent liver, similar in appearance to prior exams. This is favored to be due in part to a Riedel's lobe (normal variant).      4. Mild linear opacities within the left lower lobe consistent with mild infectious/inflammatory process versus scarring. These are new from CT of December 2022. This can be reassessed on upcoming lung cancer screening CT.      5. Possible punctate nonobstructing calculus within the right kidney.      The study was marked in EPIC for immediate notification.      Workstation performed: [id number]         CT chest wo contrast    (Results Pending)       EKG and Other Studies Reviewed on Admission:   EKG: No EKG obtained.    ** Please Note: This note has been constructed using a voice recognition system. **

## 2024-01-01 NOTE — ASSESSMENT & PLAN NOTE
Incidental finding on CT abdomen/pelvis  Mild linear opacities within the left lower lobe consistent with mild infectious/inflammatory process versus scarring.   Prior history of smoking with cessation noted 4 years prior  We will check CT of the chest without contrast

## 2024-01-02 ENCOUNTER — APPOINTMENT (INPATIENT)
Dept: ULTRASOUND IMAGING | Facility: HOSPITAL | Age: 69
DRG: 439 | End: 2024-01-02
Payer: COMMERCIAL

## 2024-01-02 LAB
ALBUMIN SERPL BCP-MCNC: 3.9 G/DL (ref 3.5–5)
ALP SERPL-CCNC: 71 U/L (ref 34–104)
ALT SERPL W P-5'-P-CCNC: 8 U/L (ref 7–52)
ANION GAP SERPL CALCULATED.3IONS-SCNC: 13 MMOL/L
AST SERPL W P-5'-P-CCNC: 12 U/L (ref 13–39)
BILIRUB SERPL-MCNC: 1.32 MG/DL (ref 0.2–1)
BUN SERPL-MCNC: 13 MG/DL (ref 5–25)
CALCIUM SERPL-MCNC: 9.2 MG/DL (ref 8.4–10.2)
CHLORIDE SERPL-SCNC: 97 MMOL/L (ref 96–108)
CO2 SERPL-SCNC: 27 MMOL/L (ref 21–32)
CREAT SERPL-MCNC: 0.67 MG/DL (ref 0.6–1.3)
ERYTHROCYTE [DISTWIDTH] IN BLOOD BY AUTOMATED COUNT: 11.5 % (ref 11.6–15.1)
EST. AVERAGE GLUCOSE BLD GHB EST-MCNC: 232 MG/DL
GFR SERPL CREATININE-BSD FRML MDRD: 100 ML/MIN/1.73SQ M
GLUCOSE SERPL-MCNC: 129 MG/DL (ref 65–140)
GLUCOSE SERPL-MCNC: 135 MG/DL (ref 65–140)
GLUCOSE SERPL-MCNC: 150 MG/DL (ref 65–140)
GLUCOSE SERPL-MCNC: 155 MG/DL (ref 65–140)
HBA1C MFR BLD: 9.7 %
HCT VFR BLD AUTO: 50.2 % (ref 36.5–49.3)
HGB BLD-MCNC: 17.2 G/DL (ref 12–17)
MAGNESIUM SERPL-MCNC: 1.8 MG/DL (ref 1.9–2.7)
MCH RBC QN AUTO: 29.9 PG (ref 26.8–34.3)
MCHC RBC AUTO-ENTMCNC: 34.3 G/DL (ref 31.4–37.4)
MCV RBC AUTO: 87 FL (ref 82–98)
PLATELET # BLD AUTO: 276 THOUSANDS/UL (ref 149–390)
PMV BLD AUTO: 9.1 FL (ref 8.9–12.7)
POTASSIUM SERPL-SCNC: 4.8 MMOL/L (ref 3.5–5.3)
PROT SERPL-MCNC: 7.4 G/DL (ref 6.4–8.4)
RBC # BLD AUTO: 5.75 MILLION/UL (ref 3.88–5.62)
SODIUM SERPL-SCNC: 137 MMOL/L (ref 135–147)
WBC # BLD AUTO: 9.17 THOUSAND/UL (ref 4.31–10.16)

## 2024-01-02 PROCEDURE — 85027 COMPLETE CBC AUTOMATED: CPT | Performed by: INTERNAL MEDICINE

## 2024-01-02 PROCEDURE — 83735 ASSAY OF MAGNESIUM: CPT | Performed by: INTERNAL MEDICINE

## 2024-01-02 PROCEDURE — 83036 HEMOGLOBIN GLYCOSYLATED A1C: CPT

## 2024-01-02 PROCEDURE — 36415 COLL VENOUS BLD VENIPUNCTURE: CPT | Performed by: INTERNAL MEDICINE

## 2024-01-02 PROCEDURE — 99222 1ST HOSP IP/OBS MODERATE 55: CPT

## 2024-01-02 PROCEDURE — 99233 SBSQ HOSP IP/OBS HIGH 50: CPT | Performed by: INTERNAL MEDICINE

## 2024-01-02 PROCEDURE — 99222 1ST HOSP IP/OBS MODERATE 55: CPT | Performed by: STUDENT IN AN ORGANIZED HEALTH CARE EDUCATION/TRAINING PROGRAM

## 2024-01-02 PROCEDURE — 80053 COMPREHEN METABOLIC PANEL: CPT | Performed by: INTERNAL MEDICINE

## 2024-01-02 PROCEDURE — 82948 REAGENT STRIP/BLOOD GLUCOSE: CPT

## 2024-01-02 PROCEDURE — 76705 ECHO EXAM OF ABDOMEN: CPT

## 2024-01-02 RX ORDER — MAGNESIUM SULFATE HEPTAHYDRATE 40 MG/ML
2 INJECTION, SOLUTION INTRAVENOUS ONCE
Status: COMPLETED | OUTPATIENT
Start: 2024-01-02 | End: 2024-01-02

## 2024-01-02 RX ADMIN — MAGNESIUM SULFATE HEPTAHYDRATE 2 G: 40 INJECTION, SOLUTION INTRAVENOUS at 08:39

## 2024-01-02 RX ADMIN — MORPHINE SULFATE 4 MG: 4 INJECTION, SOLUTION INTRAMUSCULAR; INTRAVENOUS at 15:06

## 2024-01-02 RX ADMIN — ENOXAPARIN SODIUM 40 MG: 40 INJECTION SUBCUTANEOUS at 08:37

## 2024-01-02 RX ADMIN — PRAVASTATIN SODIUM 20 MG: 20 TABLET ORAL at 08:38

## 2024-01-02 RX ADMIN — INSULIN LISPRO 1 UNITS: 100 INJECTION, SOLUTION INTRAVENOUS; SUBCUTANEOUS at 06:33

## 2024-01-02 RX ADMIN — SODIUM CHLORIDE, SODIUM LACTATE, POTASSIUM CHLORIDE, AND CALCIUM CHLORIDE 125 ML/HR: .6; .31; .03; .02 INJECTION, SOLUTION INTRAVENOUS at 10:23

## 2024-01-02 RX ADMIN — LISINOPRIL 2.5 MG: 2.5 TABLET ORAL at 08:37

## 2024-01-02 RX ADMIN — SODIUM CHLORIDE, SODIUM LACTATE, POTASSIUM CHLORIDE, AND CALCIUM CHLORIDE 125 ML/HR: .6; .31; .03; .02 INJECTION, SOLUTION INTRAVENOUS at 21:06

## 2024-01-02 NOTE — CONSULTS
"Consultation - General Surgery   Toro Adames Jr. 68 y.o. male MRN: [id number]  Unit/Bed#: ED 16 Encounter: [id number]    Assessment:  68 year old male with PMH significant for pancreatitis and diabetes presents for the evaluation of recurrent pancreatitis   Afebrile, vitals stable   WBC 9.17 (12.15)  Hgb 17.2   Na 137   Cr 0.67   Tbili 1.32 (1.47)  Mg 1.8  CTAP: \"Acute interstitial pancreatitis primarily involving the uncinate process with extension of inflammatory changes into the pancreaticoduodenal groove. No evidence of pancreatic ductal dilatation or organized peripancreatic fluid collection. Given that these inflammatory changes are somewhat focal, a short-term follow-up CT is recommended following improvement of symptoms to ensure resolution. Scattered parenchymal calcifications of the pancreas are in keeping with prior episodes of pancreatitis. Wall thickening and inflammatory changes along the distal common bile duct, favored to be reactive to pancreatitis described above. Infectious cholangitis may demonstrate an identical appearance on CT, however, there is no biliary dilatation.\"  Abdomen soft, nondistended, mildly TTP in periumbilical and epigastric region      Plan:  No surgical intervention indicated at this time. Will obtain RUQ U/S to better assess for gallbladder etiology as the cause of pancreatitis. Less concerned for infected cholangitis as patient does not have a fever or jaundice symptoms  Clear liquid diet, advance as tolerated   Analgesia and antiemetics prn   Continue IV fluids   Appreciate GI evaluation   Rest of medical management per [facility]  Will discuss with Dr. Barker     History of Present Illness   Chief Complaint: Epigastric pain     HPI:  Toro Adames Jr. is a 68 y.o. male with past medical history significant for pancreatitis and diabetes who initially presented to [facility] ED with complaints of abdominal pain. Patient reports his pain started Friday and worsened prompting " his presentation yesterday. He denies associated nausea and vomiting, but reports a decrease in appetite. Denies jaundice symptoms. He reports having intermittent diarrhea and malaise over the past few weeks. Reports approximately 5-6 pound weight loss. He reports having a previous episode of pancreatitis in 2015 after drinking alcohol. He has since stopped drinking alcohol. No history of gallbladder disease that he is aware of. He denies any previous abdominal surgeries. No blood thinners. Last colonoscopy was this past summer.     Inpatient consult to Acute Care Surgery  Consult performed by: Ariadna Whitlock PA-C  Consult ordered by: Helena Bernal DO          Review of Systems   Constitutional:  Negative for chills and fever.   HENT:  Negative for congestion and sore throat.    Respiratory:  Negative for cough and shortness of breath.    Cardiovascular:  Negative for chest pain and leg swelling.   Gastrointestinal:  Positive for abdominal pain and diarrhea. Negative for nausea and vomiting.   Endocrine: Negative for polydipsia and polyuria.   Genitourinary:  Negative for difficulty urinating and dysuria.   Musculoskeletal:  Negative for back pain and gait problem.   Skin:  Negative for color change and wound.   Neurological:  Negative for dizziness and headaches.       Historical Information   Past Medical History:   Diagnosis Date    Diabetes mellitus (HCC)     Hyperlipidemia     Hypertension     Pancreatitis     Pancreatitis     Tobacco abuse 6/14/2016     Past Surgical History:   Procedure Laterality Date    ANKLE SURGERY      INCISION AND DRAINAGE OF WOUND Right 6/15/2016    Procedure: INCISION AND DRAINAGE (I&D) EXTREMITY;  Surgeon: Andrew Israel MD;  Location: [facility];  Service:      Social History   Social History     Substance and Sexual Activity   Alcohol Use No     Social History     Substance and Sexual Activity   Drug Use No     E-Cigarette/Vaping    E-Cigarette Use Never User   "    E-Cigarette/Vaping Substances     Social History     Tobacco Use   Smoking Status Former    Current packs/day: 0.00    Average packs/day: 2.0 packs/day for 20.0 years (40.0 ttl pk-yrs)    Types: Cigarettes    Start date:     Quit date:     Years since quittin.0   Smokeless Tobacco Never     Family History: non-contributory    Meds/Allergies   all current active meds have been reviewed and current meds:   Current Facility-Administered Medications   Medication Dose Route Frequency    acetaminophen (TYLENOL) tablet 650 mg  650 mg Oral Q6H PRN    cefTRIAXone (ROCEPHIN) IVPB (premix in dextrose) 1,000 mg 50 mL  1,000 mg Intravenous Q24H    enoxaparin (LOVENOX) subcutaneous injection 40 mg  40 mg Subcutaneous Daily    insulin lispro (HumaLOG) 100 units/mL subcutaneous injection 1-5 Units  1-5 Units Subcutaneous TID AC    insulin lispro (HumaLOG) 100 units/mL subcutaneous injection 1-5 Units  1-5 Units Subcutaneous HS    lactated ringers infusion  125 mL/hr Intravenous Continuous    lisinopril (ZESTRIL) tablet 2.5 mg  2.5 mg Oral Daily    magnesium sulfate 2 g/50 mL IVPB (premix) 2 g  2 g Intravenous Once    morphine injection 4 mg  4 mg Intravenous Q4H PRN    ondansetron (ZOFRAN) injection 4 mg  4 mg Intravenous Q8H PRN    oxyCODONE (ROXICODONE) IR tablet 5 mg  5 mg Oral Q4H PRN    pravastatin (PRAVACHOL) tablet 20 mg  20 mg Oral Daily     Allergies   Allergen Reactions    Penicillins        Objective   First Vitals:   Blood Pressure: 132/94 (24)  Pulse: (!) 113 (24)  Temperature: 98.7 °F (37.1 °C) (24)  Temp Source: Temporal (24)  Respirations: 18 (24)  Height: 5' 8\" (172.7 cm) (24 0000)  Weight - Scale: 69.9 kg (154 lb) (24)  SpO2: 96 % (24)    Current Vitals:   Blood Pressure: 120/75 (24 0757)  Pulse: 89 (24 0757)  Temperature: 97.5 °F (36.4 °C) (24 0000)  Temp Source: Tympanic (24 " "0000)  Respirations: 16 (01/02/24 0757)  Height: 5' 8\" (172.7 cm) (01/02/24 0300)  Weight - Scale: 69.9 kg (154 lb) (01/02/24 0300)  SpO2: 94 % (01/02/24 0757)    No intake or output data in the 24 hours ending 01/02/24 1016    Invasive Devices       Peripheral Intravenous Line  Duration             Peripheral IV 01/01/24 Left Forearm <1 day                    Physical Exam  Vitals reviewed.   Constitutional:       General: He is not in acute distress.     Appearance: He is normal weight. He is not ill-appearing.   HENT:      Head: Normocephalic and atraumatic.   Cardiovascular:      Rate and Rhythm: Normal rate and regular rhythm.      Heart sounds: Normal heart sounds.   Pulmonary:      Effort: Pulmonary effort is normal. No respiratory distress.   Abdominal:      General: Abdomen is flat. There is no distension.      Palpations: Abdomen is soft.      Tenderness: There is abdominal tenderness in the epigastric area and periumbilical area. There is no guarding.   Musculoskeletal:      Right lower leg: No edema.      Left lower leg: No edema.   Skin:     General: Skin is warm and dry.   Neurological:      General: No focal deficit present.      Mental Status: He is alert. Mental status is at baseline.   Psychiatric:         Mood and Affect: Mood normal.         Behavior: Behavior normal.         Lab Results: I have personally reviewed pertinent lab results.  , CBC:   Lab Results   Component Value Date    WBC 9.17 01/02/2024    HGB 17.2 (H) 01/02/2024    HCT 50.2 (H) 01/02/2024    MCV 87 01/02/2024     01/02/2024    RBC 5.75 (H) 01/02/2024    MCH 29.9 01/02/2024    MCHC 34.3 01/02/2024    RDW 11.5 (L) 01/02/2024    MPV 9.1 01/02/2024    NRBC 0 01/01/2024   , CMP:   Lab Results   Component Value Date    SODIUM 137 01/02/2024    K 4.8 01/02/2024    CL 97 01/02/2024    CO2 27 01/02/2024    BUN 13 01/02/2024    CREATININE 0.67 01/02/2024    CALCIUM 9.2 01/02/2024    AST 12 (L) 01/02/2024    ALT 8 01/02/2024    " ALKPHOS 71 01/02/2024    EGFR 100 01/02/2024     Imaging: I have personally reviewed pertinent reports.    EKG, Pathology, and Other Studies: I have personally reviewed pertinent reports.        Code Status: Level 1 - Full Code  Advance Directive and Living Will:      Power of :    POLST:      Counseling / Coordination of Care  Total floor / unit time spent today 25 minutes.  Greater than 50% of total time was spent with the patient and / or family counseling and / or coordination of care.  A description of the counseling / coordination of care: evaluation of patient, review of diagnostic and laboratory studies and discussion with attending.      Ariadna Whitlock PA-C

## 2024-01-02 NOTE — ED ATTENDING ATTESTATION
"1/1/2024  I, Murray Vail III, DO, saw and evaluated the patient. I have discussed the patient with the resident/non-physician practitioner and agree with the resident's/non-physician practitioner's findings, Plan of Care, and MDM as documented in the resident's/non-physician practitioner's note, except where noted. All available labs and Radiology studies were reviewed.  I was present for key portions of any procedure(s) performed by the resident/non-physician practitioner and I was immediately available to provide assistance.       At this point I agree with the current assessment done in the Emergency Department.  I have conducted an independent evaluation of this patient a history and physical is as follows:    ED Course  ED Course as of 01/01/24 2058 Mon Jan 01, 2024   1545 Patient seen and examined, reviewed CT imaging with pt and wife at bedside.     67 y/o w male presents with generalized abdominal pain, hx of DM, hyperlipidemia, HTN and prior pancreatitis, admitted in 2015 for pancreatitis, lipase at that time mid 700's.  Reviewed CT results at the bedside, see ED course, admit for NPO, IVF, GI consult, Surgery to be consulted, prn pain meds helped at bedside, no cp, admit to [facility].      I personally examined this patient, reviewed history and case with MARY, d/w MARY plan of care.    Portions of the record may have been created with voice recognition software. Occasional wrong word or \"sound a like\" substitutions may have occurred due to the inherent limitations of voice recognition software. Read the chart carefully and recognize, using context, where substitutions have occurred.     Critical Care Time  Procedures      "

## 2024-01-02 NOTE — PROGRESS NOTES
Critical access hospital  Progress Note  Name: Toro Correa I  MRN: [id number]  Unit/Bed#: -01 I Date of Admission: 1/1/2024   Date of Service: 1/2/2024 I Hospital Day: 1    Assessment/Plan   * Acute pancreatitis  Assessment & Plan  Presented for evaluation of abdominal pain  No nausea, vomiting, or diarrhea; there is mild improvement in abdominal discomfort today   Lipase: 406, Trigs: 277  CT abd/pelvis (1/1/24): Acute interstitial pancreatitis primarily involving the uncinate process with extension of inflammatory changes into the pancreaticoduodenal groove. No evidence of pancreatic ductal dilatation or organized peripancreatic fluid collection. Scattered parenchymal calcifications of the pancreas are in keeping with prior episodes of pancreatitis. Wall thickening and inflammatory changes along the distal common bile duct, favored to be reactive to pancreatitis. Infectious cholangitis may demonstrate an identical appearance on CT.  Continue CLD and IVF resuscitation; will attempt to advance diet following completion of US   Pain control and further supportive care  GI consultation requested    Acute cholangitis  Assessment & Plan  Right upper quadrant abdominal discomfort and difficulty lying on his right side due to pain  AST/ALT/ALP: 12/8/71  T Bili: 1.32  Imaging as above  Continue IV Rocephin  Check RUQ US  General surgery evaluation appreciated    Scarring of lung  Assessment & Plan  CT Chest (1/1/24): 2 small benign foci of atelectasis/scar in the left lower lobe with a few tiny inflammatory nodules/benign intrapulmonary lymph nodes, of no clinical significance. Mild diffuse bronchial wall thickening with scattered mild endobronchial debris, compatible with bronchitis  Abnormal findings likely related to atelectasis as the patient has not been taking deep breaths due to abdominal pain.    Hyperlipidemia  Assessment & Plan  Continue home pravastatin 20 mg daily    Type 2 diabetes  mellitus with hyperglycemia, without long-term current use of insulin ([facility])  Assessment & Plan  Lab Results   Component Value Date    HGBA1C 9.2 (H) 09/25/2023       Recent Labs     01/01/24  1642 01/01/24  2138 01/02/24  0626   POCGLU 151* 164* 155*       Blood Sugar Average: Last 72 hrs:  (P) [id number]  Will hold home medications at this time  Continue lisinopril 2.5 mg daily  Continue Accu-Cheks, corrective sliding scale insulin, and hypoglycemic protocol  Diabetic clear liquid diet ordered             VTE Pharmacologic Prophylaxis: VTE Score: 3 Moderate Risk (Score 3-4) - Pharmacological DVT Prophylaxis Ordered: enoxaparin (Lovenox).           Patient Centered Rounds: I performed bedside rounds with nursing staff today.   Discussions with Specialists or Other Care Team Provider: General surgery, Nursing, CM    Education and Discussions with Family / Patient: Updated  (wife) at bedside.    Total Time Spent on Date of Encounter in care of patient: 35 mins. This time was spent on one or more of the following: performing physical exam; counseling and coordination of care; obtaining or reviewing history; documenting in the medical record; reviewing/ordering tests, medications or procedures; communicating with other healthcare professionals and discussing with patient's family/caregivers.    Current Length of Stay: 1 day(s)  Current Patient Status: Inpatient   Certification Statement: The patient will continue to require additional inpatient hospital stay due to acute pancreatitis.  Discharge Plan: Anticipate discharge in 24-48 hrs to home.    Code Status: Level 1 - Full Code    Subjective:   Patient is sitting up at the bedside without any acute complaints.  He he denies any nausea, vomiting, or diarrhea.  He continues to endorse abdominal discomfort but states this is a 3 out of 10 and significantly improved since admission.  No overnight events reported by nursing.    Objective:     Vitals:    Temp (24hrs), Av.5 °F (36.4 °C), Min:97.5 °F (36.4 °C), Max:97.5 °F (36.4 °C)    Temp:  [97.5 °F (36.4 °C)] 97.5 °F (36.4 °C)  HR:  [] 89  Resp:  [16-18] 16  BP: (120-133)/(69-76) 120/75  SpO2:  [94 %-97 %] 94 %  Body mass index is 23.42 kg/m².     Input and Output Summary (last 24 hours):   No intake or output data in the 24 hours ending 24 1203    Physical Exam:   Physical Exam  Vitals and nursing note reviewed.   Constitutional:       General: He is not in acute distress.     Appearance: Normal appearance. He is normal weight.   HENT:      Head: Normocephalic and atraumatic.      Mouth/Throat:      Mouth: Mucous membranes are moist.      Pharynx: Oropharynx is clear.   Eyes:      Extraocular Movements: Extraocular movements intact.      Conjunctiva/sclera: Conjunctivae normal.   Cardiovascular:      Rate and Rhythm: Normal rate and regular rhythm.      Pulses: Normal pulses.      Heart sounds: Normal heart sounds. No murmur heard.  Pulmonary:      Effort: Pulmonary effort is normal. No respiratory distress.      Breath sounds: Normal breath sounds. No wheezing.   Abdominal:      General: Bowel sounds are normal. There is no distension.      Palpations: Abdomen is soft.      Tenderness: There is abdominal tenderness.   Musculoskeletal:         General: Normal range of motion.      Cervical back: Normal range of motion and neck supple.   Skin:     General: Skin is warm and dry.   Neurological:      General: No focal deficit present.      Mental Status: He is alert and oriented to person, place, and time. Mental status is at baseline.   Psychiatric:         Mood and Affect: Mood normal.         Behavior: Behavior normal.         Judgment: Judgment normal.          Labs:  Results from last 7 days   Lab Units 24  0533 24  1117   WBC Thousand/uL 9.17 12.15*   HEMOGLOBIN g/dL 17.2* 18.0*   HEMATOCRIT % 50.2* 52.6*   PLATELETS Thousands/uL 276 290   NEUTROS PCT %  --  70   LYMPHS PCT %  --   22   MONOS PCT %  --  7   EOS PCT %  --  1     Results from last 7 days   Lab Units 01/02/24  0533   SODIUM mmol/L 137   POTASSIUM mmol/L 4.8   CHLORIDE mmol/L 97   CO2 mmol/L 27   BUN mg/dL 13   CREATININE mg/dL 0.67   ANION GAP mmol/L 13   CALCIUM mg/dL 9.2   ALBUMIN g/dL 3.9   TOTAL BILIRUBIN mg/dL 1.32*   ALK PHOS U/L 71   ALT U/L 8   AST U/L 12*   GLUCOSE RANDOM mg/dL 150*         Results from last 7 days   Lab Units 01/02/24  0626 01/01/24  2138 01/01/24  1642   POC GLUCOSE mg/dl 155* 164* 151*               Lines/Drains:  Invasive Devices       Peripheral Intravenous Line  Duration             Peripheral IV 01/01/24 Left Forearm 1 day                          Imaging: Reviewed radiology reports from this admission including: chest CT scan    Recent Cultures (last 7 days):         Last 24 Hours Medication List:   Current Facility-Administered Medications   Medication Dose Route Frequency Provider Last Rate    acetaminophen  650 mg Oral Q6H PRN Helena Bernal DO      cefTRIAXone  1,000 mg Intravenous Q24H Helena Bernal DO Stopped (01/01/24 1940)    enoxaparin  40 mg Subcutaneous Daily Helena Bernal DO      insulin lispro  1-5 Units Subcutaneous TID AC eHlena Bernal DO      insulin lispro  1-5 Units Subcutaneous HS Helena Bernal DO      lactated ringers  125 mL/hr Intravenous Continuous Helena Bernal  mL/hr (01/02/24 1023)    lisinopril  2.5 mg Oral Daily Helena Bernal DO      morphine injection  4 mg Intravenous Q4H PRN Helena Bernal DO      ondansetron  4 mg Intravenous Q8H PRN Helena Bernal DO      oxyCODONE  5 mg Oral Q4H PRN Helena Bernal, DO      pravastatin  20 mg Oral Daily Helena Bernal DO          Today, Patient Was Seen By: Helena Bernal DO    **Please Note: This note may have been constructed using a voice recognition system.**

## 2024-01-02 NOTE — CONSULTS
Consultation -  [facility]  Toro Adames Jr. 68 y.o. male MRN: [id number]  Unit/Bed#: ED 16 Encounter: [id number]        Inpatient consult to gastroenterology  Consult performed by: Iva Rayo DO  Consult ordered by: Helena Bernal DO        Reason for Consult / Principal Problem: Pancreatitis       ASSESSMENT AND PLAN:      Mr. Toro Adames is a 68 year old male with a PMHx DMII, HLD, and prior pancreatitis in 2015 who initially presented on 1/1/24 with c/o ongoing abdominal discomfort. Lipase elevated and CT imaging demonstrating pancreatitis for which GI is consulted.     Epigastric/RUQ Abdominal Pain  Acute Interstitial Pancreatitis   Chronic Isolated Hyperbilirubinemia     Mild Acute Interstitial Pancreatitis  (BISAP Score 1): Patient presenting with epigastric abdominal pain with elevated lipase of 406 along with CT imaging demonstrating acute interstitial pancreatitis along with scattered parenchymal calcifications consistent with acute on chronic pancreatitis.  Patient with prior episode of pancreatitis in 2015 secondary to a single episode of binge drinking.  Patient with no further alcohol use and triglycerides within normal limits.  CT imaging with no evidence of gallstones but this is not the best imaging to modality to evaluate this.  CT did reveal wall thickening and inflammatory changes in the distal CBD.  LFTs within normal limits outside of chronic, mild elevation of bilirubin.  Bilirubin 1.47 on arrival and improved to 1.32 today.  Unclear etiology of pancreatitis at this time.  However, would rule out gallstones given chronic abdominal pain and elevated bilirubin accompanied by distal CBD wall thickening.  Will obtain right upper quadrant ultrasound to further evaluate.  If ultrasound shows evidence of gallstones, would favor cholecystectomy.  If imaging is negative, would consider MRI/MRCP given CT findings to rule out underlying malignancy although low  suspicion given lack of dilation.  Fortunately, no clinical concern for cholangitis at this time.  Therefore in the interim, continue on with conservative management with slow advancement of diet and pain/symptom control.    Plan:   RUQ US ordered and pending completion  Aggressive IVF hydration; continue LR @ 125 cc/hr  Monitor LFTs to ensure improvement; avoid hepatotoxic agents  Monitor urine output along with BUN/sCr; maintain UO of >0.5 ml/kg/hour  Current diet clear liquid diet; advance diet slowly as patient tolerates  If worsening abdominal pain or signs of organ failure , consider repeat CT in 72 hr  Monitor off antibiotics; monitor WBC and trend fever curve  No indication to trend Lipase levels   General surgery consulted, appreciate recommendations.  PRN pain and anti-emetics; additional symptom management per primary team   ______________________________________________________________________    HPI:  Mr. Toro Adames is a 68 year old male with a PMHx DMII, HLD, and prior pancreatitis in 2015 who initially presented on 1/1/24 with c/o ongoing abdominal discomfort. Patient states that for the last couple of weeks, he has been experiencing pain in the RUQ with poor PO intake.  Does admit that oral intake triggered the pain.  On Friday, pain worsened and also evolved to pain in epigastric area along with prior RUQ pain.  Denied any nausea or vomiting or fever/chills.  However, pain continued to worsen prompting ED arrival.     Of note, patient with prior history of pancreatitis in the past.  He does state that episode occurred after a night of binge drinking.  Denies any alcohol use since this episode in 2015.  Denies any prior abdominal surgeries or history of gallstones.  Does admit to prior history of tobacco use but quit in 2020.  Is any recent changes in medications.  Patient also believes he has family history of pancreatic cancer.    At time of ED arrival, patient was hemodynamically stable and  afebrile. VS notable for tachycardia. Labs significant for lipase level of 406 and leukocytosis of 12.5. AST/ALT 11/10, alk phos 76, and tbili 1.47. CT abdomen/pelvis with contrast completed and showed findings of acute interstitial pancreatitis along with scattered parenchymal calcifications. Wall thickening and inflammatory changes also noted in the distal CBD but with no associated biliary dilation.    Patient seen and examined at time of consultation.  Patient lying in bed comfortably in no acute distress or visible discomfort.  Does admit to continued mild abdominal pain.  States that certain liquids that he drank last night did aggravate the pain.  Denies any nausea/vomiting.  No additional complaints at this time.      REVIEW OF SYSTEMS:    CONSTITUTIONAL: Denies any fever, chills, rigors, and weight loss.  HEENT: No earache or tinnitus. Denies hearing loss or visual disturbances.  CARDIOVASCULAR: No chest pain or palpitations.   RESPIRATORY: Denies any cough, hemoptysis, shortness of breath or dyspnea on exertion.  GASTROINTESTINAL: As noted in the History of Present Illness.   GENITOURINARY: No problems with urination. Denies any hematuria or dysuria.  NEUROLOGIC: No dizziness or vertigo, denies headaches.   MUSCULOSKELETAL: Denies any muscle or joint pain.   SKIN: Denies skin rashes or itching.   ENDOCRINE: Denies excessive thirst. Denies intolerance to heat or cold.  PSYCHOSOCIAL: Denies depression or anxiety. Denies any recent memory loss.       Historical Information   Past Medical History:   Diagnosis Date    Diabetes mellitus (HCC)     Hyperlipidemia     Hypertension     Pancreatitis     Pancreatitis     Tobacco abuse 6/14/2016     Past Surgical History:   Procedure Laterality Date    ANKLE SURGERY      INCISION AND DRAINAGE OF WOUND Right 6/15/2016    Procedure: INCISION AND DRAINAGE (I&D) EXTREMITY;  Surgeon: Andrew Israel MD;  Location: [facility];  Service:      Social History   Social History  "    Substance and Sexual Activity   Alcohol Use No     Social History     Substance and Sexual Activity   Drug Use No     Social History     Tobacco Use   Smoking Status Former    Current packs/day: 0.00    Average packs/day: 2.0 packs/day for 20.0 years (40.0 ttl pk-yrs)    Types: Cigarettes    Start date:     Quit date:     Years since quittin.0   Smokeless Tobacco Never     Family History   Problem Relation Age of Onset    Diabetes Mother     Dementia Father        Meds/Allergies     (Not in a hospital admission)    Current Facility-Administered Medications   Medication Dose Route Frequency    acetaminophen (TYLENOL) tablet 650 mg  650 mg Oral Q6H PRN    cefTRIAXone (ROCEPHIN) IVPB (premix in dextrose) 1,000 mg 50 mL  1,000 mg Intravenous Q24H    enoxaparin (LOVENOX) subcutaneous injection 40 mg  40 mg Subcutaneous Daily    insulin lispro (HumaLOG) 100 units/mL subcutaneous injection 1-5 Units  1-5 Units Subcutaneous TID AC    insulin lispro (HumaLOG) 100 units/mL subcutaneous injection 1-5 Units  1-5 Units Subcutaneous HS    lactated ringers infusion  125 mL/hr Intravenous Continuous    lisinopril (ZESTRIL) tablet 2.5 mg  2.5 mg Oral Daily    morphine injection 4 mg  4 mg Intravenous Q4H PRN    ondansetron (ZOFRAN) injection 4 mg  4 mg Intravenous Q8H PRN    oxyCODONE (ROXICODONE) IR tablet 5 mg  5 mg Oral Q4H PRN    pravastatin (PRAVACHOL) tablet 20 mg  20 mg Oral Daily       Allergies   Allergen Reactions    Penicillins            Objective     Blood pressure 120/75, pulse 89, temperature 97.5 °F (36.4 °C), temperature source Tympanic, resp. rate 16, height 5' 8\" (1.727 m), weight 69.9 kg (154 lb), SpO2 94%. Body mass index is 23.42 kg/m².    No intake or output data in the 24 hours ending 24 1036      PHYSICAL EXAM:      General Appearance:   Alert, cooperative, no distress   HEENT:   Normocephalic, atraumatic, anicteric.     Neck:  Supple, symmetrical, trachea midline   Lungs:   Clear to " auscultation bilaterally; no rales, rhonchi or wheezing; respirations unlabored    Heart::   Regular rate and rhythm; no murmur, rub, or gallop.   Abdomen:   Soft, non-distended; normal bowel sounds; no masses, no organomegaly.  Mild tenderness to palpation of the epigastric region.   Genitalia:   Deferred    Rectal:   Deferred    Extremities:  No cyanosis, clubbing or edema    Pulses:  2+ and symmetric all extremities    Skin:  No jaundice, rashes, or lesions    Lymph nodes:  No palpable cervical lymphadenopathy        Lab Results:   Admission on 01/01/2024   Component Date Value    WBC 01/01/2024 12.15 (H)     RBC 01/01/2024 5.98 (H)     Hemoglobin 01/01/2024 18.0 (H)     Hematocrit 01/01/2024 52.6 (H)     MCV 01/01/2024 88     MCH 01/01/2024 30.1     MCHC 01/01/2024 34.2     RDW 01/01/2024 11.5 (L)     MPV 01/01/2024 9.2     Platelets 01/01/2024 290     nRBC 01/01/2024 0     Neutrophils Relative 01/01/2024 70     Immat GRANS % 01/01/2024 0     Lymphocytes Relative 01/01/2024 22     Monocytes Relative 01/01/2024 7     Eosinophils Relative 01/01/2024 1     Basophils Relative 01/01/2024 0     Neutrophils Absolute 01/01/2024 8.51 (H)     Immature Grans Absolute 01/01/2024 0.03     Lymphocytes Absolute 01/01/2024 2.61     Monocytes Absolute 01/01/2024 0.84     Eosinophils Absolute 01/01/2024 0.12     Basophils Absolute 01/01/2024 0.04     Sodium 01/01/2024 134 (L)     Potassium 01/01/2024 4.2     Chloride 01/01/2024 97     CO2 01/01/2024 23     ANION GAP 01/01/2024 14     BUN 01/01/2024 12     Creatinine 01/01/2024 0.66     Glucose 01/01/2024 225 (H)     Calcium 01/01/2024 9.3     AST 01/01/2024 11 (L)     ALT 01/01/2024 10     Alkaline Phosphatase 01/01/2024 76     Total Protein 01/01/2024 7.8     Albumin 01/01/2024 4.0     Total Bilirubin 01/01/2024 1.47 (H)     eGFR 01/01/2024 100     Lipase 01/01/2024 406 (H)     SARS-CoV-2 01/01/2024 Negative     INFLUENZA A PCR 01/01/2024 Negative     INFLUENZA B PCR 01/01/2024  Negative     RSV PCR 01/01/2024 Negative     Cholesterol 01/01/2024 210 (H)     Triglycerides 01/01/2024 277 (H)     HDL, Direct 01/01/2024 49     LDL Calculated 01/01/2024 106 (H)     Non-HDL-Chol (CHOL-HDL) 01/01/2024 161     POC Glucose 01/01/2024 151 (H)     POC Glucose 01/01/2024 164 (H)     Sodium 01/02/2024 137     Potassium 01/02/2024 4.8     Chloride 01/02/2024 97     CO2 01/02/2024 27     ANION GAP 01/02/2024 13     BUN 01/02/2024 13     Creatinine 01/02/2024 0.67     Glucose 01/02/2024 150 (H)     Calcium 01/02/2024 9.2     AST 01/02/2024 12 (L)     ALT 01/02/2024 8     Alkaline Phosphatase 01/02/2024 71     Total Protein 01/02/2024 7.4     Albumin 01/02/2024 3.9     Total Bilirubin 01/02/2024 1.32 (H)     eGFR 01/02/2024 100     Magnesium 01/02/2024 1.8 (L)     WBC 01/02/2024 9.17     RBC 01/02/2024 5.75 (H)     Hemoglobin 01/02/2024 17.2 (H)     Hematocrit 01/02/2024 50.2 (H)     MCV 01/02/2024 87     MCH 01/02/2024 29.9     MCHC 01/02/2024 34.3     RDW 01/02/2024 11.5 (L)     Platelets 01/02/2024 276     MPV 01/02/2024 9.1     POC Glucose 01/02/2024 155 (H)        Imaging Studies: I have personally reviewed pertinent imaging studies.

## 2024-01-02 NOTE — ASSESSMENT & PLAN NOTE
CT Chest (1/1/24): 2 small benign foci of atelectasis/scar in the left lower lobe with a few tiny inflammatory nodules/benign intrapulmonary lymph nodes, of no clinical significance. Mild diffuse bronchial wall thickening with scattered mild endobronchial debris, compatible with bronchitis  Abnormal findings likely related to atelectasis as the patient has not been taking deep breaths due to abdominal pain.

## 2024-01-02 NOTE — ASSESSMENT & PLAN NOTE
Presented for evaluation of abdominal pain  No nausea, vomiting, or diarrhea; there is mild improvement in abdominal discomfort today   Lipase: 406, Trigs: 277  CT abd/pelvis (1/1/24): Acute interstitial pancreatitis primarily involving the uncinate process with extension of inflammatory changes into the pancreaticoduodenal groove. No evidence of pancreatic ductal dilatation or organized peripancreatic fluid collection. Scattered parenchymal calcifications of the pancreas are in keeping with prior episodes of pancreatitis. Wall thickening and inflammatory changes along the distal common bile duct, favored to be reactive to pancreatitis. Infectious cholangitis may demonstrate an identical appearance on CT.  Continue CLD and IVF resuscitation; will attempt to advance diet following completion of US   Pain control and further supportive care  GI consultation requested

## 2024-01-02 NOTE — PLAN OF CARE
Problem: PAIN - ADULT  Goal: Verbalizes/displays adequate comfort level or baseline comfort level  Description: Interventions:  - Encourage patient to monitor pain and request assistance  - Assess pain using appropriate pain scale  - Administer analgesics based on type and severity of pain and evaluate response  - Implement non-pharmacological measures as appropriate and evaluate response  - Consider cultural and social influences on pain and pain management  - Notify physician/advanced practitioner if interventions unsuccessful or patient reports new pain  Outcome: Progressing     Problem: INFECTION - ADULT  Goal: Absence or prevention of progression during hospitalization  Description: INTERVENTIONS:  - Assess and monitor for signs and symptoms of infection  - Monitor lab/diagnostic results  - Monitor all insertion sites, i.e. indwelling lines, tubes, and drains  - Monitor endotracheal if appropriate and nasal secretions for changes in amount and color  - [place] appropriate cooling/warming therapies per order  - Administer medications as ordered  - Instruct and encourage patient and family to use good hand hygiene technique  - Identify and instruct in appropriate isolation precautions for identified infection/condition  Outcome: Progressing  Goal: Absence of fever/infection during neutropenic period  Description: INTERVENTIONS:  - Monitor WBC    Outcome: Progressing     Problem: SAFETY ADULT  Goal: Patient will remain free of falls  Description: INTERVENTIONS:  - Educate patient/family on patient safety including physical limitations  - Instruct patient to call for assistance with activity   - Consult OT/PT to assist with strengthening/mobility   - Keep Call bell within reach  - Keep bed low and locked with side rails adjusted as appropriate  - Keep care items and personal belongings within reach  - Initiate and maintain comfort rounds  - Make Fall Risk Sign visible to staff  - Offer Toileting every 2 Hours,  in advance of need  - Initiate/Maintain bed alarm  - Obtain necessary fall risk management equipment: bed alarm  - Apply yellow socks and bracelet for high fall risk patients  - Consider moving patient to room near nurses station  Outcome: Progressing  Goal: Maintain or return to baseline ADL function  Description: INTERVENTIONS:  -  Assess patient's ability to carry out ADLs; assess patient's baseline for ADL function and identify physical deficits which impact ability to perform ADLs (bathing, care of mouth/teeth, toileting, grooming, dressing, etc.)  - Assess/evaluate cause of self-care deficits   - Assess range of motion  - Assess patient's mobility; develop plan if impaired  - Assess patient's need for assistive devices and provide as appropriate  - Encourage maximum independence but intervene and supervise when necessary  - Involve family in performance of ADLs  - Assess for home care needs following discharge   - Consider OT consult to assist with ADL evaluation and planning for discharge  - Provide patient education as appropriate  Outcome: Progressing  Goal: Maintains/Returns to pre admission functional level  Description: INTERVENTIONS:  - Perform AM-PAC 6 Click Basic Mobility/ Daily Activity assessment daily.  - Set and communicate daily mobility goal to care team and patient/family/caregiver.   - Collaborate with rehabilitation services on mobility goals if consulted  - Perform Range of Motion 3 times a day.  - Reposition patient every 2 hours.  - Dangle patient 3 times a day  - Stand patient 3 times a day  - Ambulate patient 3 times a day  - Out of bed to chair 3 times a day   - Out of bed for meals 3 times a day  - Out of bed for toileting  - Record patient progress and toleration of activity level   Outcome: Progressing     Problem: DISCHARGE PLANNING  Goal: Discharge to home or other facility with appropriate resources  Description: INTERVENTIONS:  - Identify barriers to discharge w/patient and  caregiver  - Arrange for needed discharge resources and transportation as appropriate  - Identify discharge learning needs (meds, wound care, etc.)  - Refer to Case Management Department for coordinating discharge planning if the patient needs post-hospital services based on physician/advanced practitioner order or complex needs related to functional status, cognitive ability, or social support system  Outcome: Progressing     Problem: Knowledge Deficit  Goal: Patient/family/caregiver demonstrates understanding of disease process, treatment plan, medications, and discharge instructions  Description: Complete learning assessment and assess knowledge base.  Interventions:  - Provide teaching at level of understanding  - Provide teaching via preferred learning methods  Outcome: Progressing

## 2024-01-02 NOTE — UTILIZATION REVIEW
Initial Clinical Review    Admission: Date/Time/Statement:   Admission Orders (From admission, onward)       Ordered        01/01/24 1432  INPATIENT ADMISSION  Once                          Orders Placed This Encounter   Procedures    INPATIENT ADMISSION     Standing Status:   Standing     Number of Occurrences:   1     Order Specific Question:   Level of Care     Answer:   Med Surg [16]     Order Specific Question:   Estimated length of stay     Answer:   More than 2 Midnights     Order Specific Question:   Certification     Answer:   I certify that inpatient services are medically necessary for this patient for a duration of greater than two midnights. See H&P and MD Progress Notes for additional information about the patient's course of treatment.     ED Arrival Information       Expected   -    Arrival   1/1/2024 09:46    Acuity   Urgent              Means of arrival   Walk-In    Escorted by   Spouse    Service   Hospitalist    Admission type   Emergency              Arrival complaint   abdominal pain, not eating             Chief Complaint   Patient presents with    Abdominal Pain       Initial Presentation: 68 y.o. male with PMHx: DMII and HLD, pancreatitis, who presented to [facility] ED due to intermittent abdominal discomfort for several weeks, intermittent diarrhea and constipation with decreased appetite and 6 pound weight loss in the last couple of weeks. 3 days PTA he developed significant epigastric and right upper quadrant abdominal discomfort which has progressively been worsening and prompted his presentation.  In the ED, CT AP revealed acute pancreatitis and possible infectious cholangitis. Plan:  Admit Inpatient status to med surg dt Acute Pancreatitis: clear liquid diet, IVF, GI and general sx consults, pain control, monitor labs, start IV rocephin, order CT chest, start accuchecks w/ SSI.    Date: 1/2   Day 2: Continues to c/o abd discomfort. Abdominal tenderness noted on exam. Continue  above tx plan and recommends of GI and General Sx.     1/2 Per GI: Order RUQ US, aggressive IVF, monitor LFTs and UOP, continue CLD, if pain worsens repeat CT in 72 hrs, monitor off ABX, monitor wbc and fever, continue pain and nausea control prn.     1/2 Per General Sx: No surgical intervention indicated at this time. Will obtain RUQ U/S to better assess for gallbladder etiology as the cause of pancreatitis. Less concerned for infected cholangitis as patient does not have a fever or jaundice symptoms. Continue clear liquid diet, pain and nausea control prn, IVF.    Date: 1/3    Day 3: Has surpassed a 2nd midnight with active treatments and services, which include IVF, pain and nausea control, fu on RUQ US, monitor labs.    ED Triage Vitals [01/01/24 1039]   Temperature Pulse Respirations Blood Pressure SpO2   98.7 °F (37.1 °C) (!) 113 18 132/94 96 %      Temp Source Heart Rate Source Patient Position - Orthostatic VS BP Location FiO2 (%)   Temporal Monitor Sitting Left arm --      Pain Score       9          Wt Readings from Last 1 Encounters:   01/02/24 69.9 kg (154 lb)     Additional Vital Signs:   Date/Time Temp Pulse Resp BP MAP (mmHg) SpO2 O2 Device Patient Position - Orthostatic VS   01/03/24 06:56:15 97.9 °F (36.6 °C) 87 18 128/85 99 94 % -- --   01/02/24 22:29:26 98.3 °F (36.8 °C) 96 18 131/84 100 96 % -- Sitting   01/02/24 12:32:24 98.6 °F (37 °C) 85 13 115/78 90 97 % -- --   01/02/24 0757 -- 89 16 120/75 91 94 % None (Room air) Lying   01/02/24 0300 -- 84 18 133/69 -- 97 % None (Room air) Lying   01/02/24 0000 97.5 °F (36.4 °C) 100 18 130/76 -- 97 % None (Room air) Lying   01/01/24 1039 98.7 °F (37.1 °C) 113 Abnormal  18 132/94 -- 96 % None (Room air) Sitting     Pertinent Labs/Diagnostic Test Results:   US right upper quadrant   Final Result by Zenon Galdamez MD (01/02 1752)      Small sludge in the gallbladder. No sonographic evidence for acute cholecystitis.      Hepatomegaly/hepatic steatosis.       Pancreas obscured by underlying bowel gas.      Workstation performed: [id number]         CT chest wo contrast   Final Result by Seema Escalante MD (01/02 0833)      2 small benign foci of atelectasis/scar in the left lower lobe with a few tiny inflammatory nodules/benign intrapulmonary lymph nodes, of no clinical significance.      Mild diffuse bronchial wall thickening with scattered mild endobronchial debris, compatible with bronchitis.         Workstation performed: [id number]         CT abdomen pelvis with contrast   Final Result by Waqar Dacosta DO (01/01 1348)      1. Acute interstitial pancreatitis primarily involving the uncinate process with extension of inflammatory changes into the pancreaticoduodenal groove. No evidence of pancreatic ductal dilatation or organized peripancreatic fluid collection. Given that    these inflammatory changes are somewhat focal, a short-term follow-up CT is recommended following improvement of symptoms to ensure resolution. Scattered parenchymal calcifications of the pancreas are in keeping with prior episodes of pancreatitis.      2. Wall thickening and inflammatory changes along the distal common bile duct, favored to be reactive to pancreatitis described above. Infectious cholangitis may demonstrate an identical appearance on CT, however, there is no biliary dilatation.    Clinical/laboratory correlation recommended.      3. Prominent liver, similar in appearance to prior exams. This is favored to be due in part to a Riedel's lobe (normal variant).      4. Mild linear opacities within the left lower lobe consistent with mild infectious/inflammatory process versus scarring. These are new from CT of December 2022. This can be reassessed on upcoming lung cancer screening CT.      5. Possible punctate nonobstructing calculus within the right kidney.      The study was marked in EPIC for immediate notification.      Workstation performed: [id number]            Results from last 7 days   Lab Units 01/01/24  1144   SARS-COV-2  Negative     Results from last 7 days   Lab Units 01/03/24 0220 01/02/24  0533 01/01/24  1117   WBC Thousand/uL 8.91 9.17 12.15*   HEMOGLOBIN g/dL 17.3* 17.2* 18.0*   HEMATOCRIT % 49.7* 50.2* 52.6*   PLATELETS Thousands/uL 262 276 290   NEUTROS ABS Thousands/µL  --   --  8.51*         Results from last 7 days   Lab Units 01/03/24 0220 01/02/24  0533 01/01/24  1117   SODIUM mmol/L 132* 137 134*   POTASSIUM mmol/L 4.3 4.8 4.2   CHLORIDE mmol/L 97 97 97   CO2 mmol/L 21 27 23   ANION GAP mmol/L 14 13 14   BUN mg/dL 11 13 12   CREATININE mg/dL 0.62 0.67 0.66   EGFR ml/min/1.73sq m 103 100 100   CALCIUM mg/dL 8.8 9.2 9.3   MAGNESIUM mg/dL  --  1.8*  --      Results from last 7 days   Lab Units 01/03/24 0220 01/02/24  0533 01/01/24  1117   AST U/L 16 12* 11*   ALT U/L 9 8 10   ALK PHOS U/L 61 71 76   TOTAL PROTEIN g/dL 7.2 7.4 7.8   ALBUMIN g/dL 3.7 3.9 4.0   TOTAL BILIRUBIN mg/dL 1.20* 1.32* 1.47*     Results from last 7 days   Lab Units 01/03/24  0727 01/02/24  2101 01/02/24  1613 01/02/24  0626 01/01/24  2138 01/01/24  1642   POC GLUCOSE mg/dl 152* 135 129 155* 164* 151*     Results from last 7 days   Lab Units 01/03/24 0220 01/02/24  0533 01/01/24  1117   GLUCOSE RANDOM mg/dL 155* 150* 225*         Results from last 7 days   Lab Units 01/02/24  0533   HEMOGLOBIN A1C % 9.7*   EAG mg/dl 232     BETA-HYDROXYBUTYRATE   Date Value Ref Range Status   12/07/2022 0.4 <0.6 mmol/L Final        Results from last 7 days   Lab Units 01/01/24  1117   LIPASE u/L 406*       Results from last 7 days   Lab Units 01/01/24  1144   INFLUENZA A PCR  Negative   INFLUENZA B PCR  Negative   RSV PCR  Negative     ED Treatment:   Medication Administration from 01/01/2024 0946 to 01/02/2024 1141         Date/Time Order Dose Route Action     01/01/2024 1219 EST iohexol (OMNIPAQUE) 350 MG/ML injection (MULTI-DOSE) 100 mL 100 mL Intravenous Given     01/01/2024 1416 EST sodium  chloride 0.9 % bolus 1,000 mL 1,000 mL Intravenous New Bag     01/01/2024 1415 EST morphine injection 4 mg 4 mg Intravenous Given     01/02/2024 0837 EST lisinopril (ZESTRIL) tablet 2.5 mg 2.5 mg Oral Given     01/02/2024 0838 EST pravastatin (PRAVACHOL) tablet 20 mg 20 mg Oral Given     01/01/2024 1557 EST pravastatin (PRAVACHOL) tablet 20 mg 20 mg Oral Given     01/02/2024 1023 EST lactated ringers infusion 125 mL/hr Intravenous New Bag     01/01/2024 1601 EST lactated ringers infusion 125 mL/hr Intravenous New Bag     01/02/2024 0837 EST enoxaparin (LOVENOX) subcutaneous injection 40 mg 40 mg Subcutaneous Given     01/01/2024 1558 EST enoxaparin (LOVENOX) subcutaneous injection 40 mg 40 mg Subcutaneous Given     01/02/2024 0633 EST insulin lispro (HumaLOG) 100 units/mL subcutaneous injection 1-5 Units 1 Units Subcutaneous Given     01/01/2024 1715 EST insulin lispro (HumaLOG) 100 units/mL subcutaneous injection 1-5 Units 1 Units Subcutaneous Given     01/01/2024 2141 EST insulin lispro (HumaLOG) 100 units/mL subcutaneous injection 1-5 Units 1 Units Subcutaneous Given     01/01/2024 1639 EST cefTRIAXone (ROCEPHIN) IVPB (premix in dextrose) 1,000 mg 50 mL 1,000 mg Intravenous New Bag     01/02/2024 0839 EST magnesium sulfate 2 g/50 mL IVPB (premix) 2 g 2 g Intravenous New Bag          Past Medical History:   Diagnosis Date    Diabetes mellitus (HCC)     Hyperlipidemia     Hypertension     Pancreatitis     Pancreatitis     Tobacco abuse 6/14/2016     Present on Admission:   Type 2 diabetes mellitus with hyperglycemia, without long-term current use of insulin (HCC)   Hyperlipidemia      Admitting Diagnosis: Pancreatitis [K85.90]  Abdominal pain [R10.9]  Age/Sex: 68 y.o. male  Admission Orders:  Scheduled Medications:  enoxaparin, 40 mg, Subcutaneous, Daily  insulin lispro, 1-5 Units, Subcutaneous, TID AC  insulin lispro, 1-5 Units, Subcutaneous, HS  lisinopril, 2.5 mg, Oral, Daily  pravastatin, 20 mg, Oral,  Daily      Continuous IV Infusions:  lactated ringers, 125 mL/hr, Intravenous, Continuous      PRN Meds:  acetaminophen, 650 mg, Oral, Q6H PRN  morphine injection, 4 mg, Intravenous, Q4H PRN x1 thus far  ondansetron, 4 mg, Intravenous, Q8H PRN  oxyCODONE, 5 mg, Oral, Q4H PRN    scd    IP CONSULT TO ACUTE CARE SURGERY  IP CONSULT TO GASTROENTEROLOGY    Network Utilization Review Department  ATTENTION: Please call with any questions or concerns to [phone] and carefully listen to the prompts so that you are directed to the right person. All voicemails are confidential.   For Discharge needs, contact Care Management DC Support Team at [phone] opt. 2  Send all requests for admission clinical reviews, approved or denied determinations and any other requests to dedicated fax number below belonging to the [place] where the patient is receiving treatment. List of dedicated fax numbers for the Facilities:  FACILITY NAME UR FAX NUMBER   ADMISSION DENIALS (Administrative/Medical Necessity) [phone]   DISCHARGE SUPPORT TEAM (NETWORK) [phone]   PARENT CHILD HEALTH (Maternity/NICU/Pediatrics) [phone]   [facility] [phone]   [facility] [phone]   [facility] [phone]   [facility] [phone]   [facility] [phone]   [facility] [phone]   [facility] [phone]   [facility] [phone]   [facility] [phone]   [facility] [phone]   [facility] [phone]

## 2024-01-02 NOTE — ASSESSMENT & PLAN NOTE
Lab Results   Component Value Date    HGBA1C 9.2 (H) 09/25/2023       Recent Labs     01/01/24  1642 01/01/24  2138 01/02/24  0626   POCGLU 151* 164* 155*       Blood Sugar Average: Last 72 hrs:  (P) [id number]  Will hold home medications at this time  Continue lisinopril 2.5 mg daily  Continue Accu-Cheks, corrective sliding scale insulin, and hypoglycemic protocol  Diabetic clear liquid diet ordered

## 2024-01-02 NOTE — ASSESSMENT & PLAN NOTE
Right upper quadrant abdominal discomfort and difficulty lying on his right side due to pain  AST/ALT/ALP: 12/8/71  T Bili: 1.32  Imaging as above  Continue IV Rocephin  Check RUQ US  General surgery evaluation appreciated

## 2024-01-03 ENCOUNTER — TELEPHONE (OUTPATIENT)
Age: 69
End: 2024-01-03

## 2024-01-03 VITALS
DIASTOLIC BLOOD PRESSURE: 85 MMHG | OXYGEN SATURATION: 94 % | SYSTOLIC BLOOD PRESSURE: 128 MMHG | RESPIRATION RATE: 18 BRPM | TEMPERATURE: 97.9 F | WEIGHT: 154 LBS | HEIGHT: 68 IN | HEART RATE: 87 BPM | BODY MASS INDEX: 23.34 KG/M2

## 2024-01-03 LAB
ALBUMIN SERPL BCP-MCNC: 3.7 G/DL (ref 3.5–5)
ALP SERPL-CCNC: 61 U/L (ref 34–104)
ALT SERPL W P-5'-P-CCNC: 9 U/L (ref 7–52)
ANION GAP SERPL CALCULATED.3IONS-SCNC: 14 MMOL/L
AST SERPL W P-5'-P-CCNC: 16 U/L (ref 13–39)
BILIRUB SERPL-MCNC: 1.2 MG/DL (ref 0.2–1)
BUN SERPL-MCNC: 11 MG/DL (ref 5–25)
CALCIUM SERPL-MCNC: 8.8 MG/DL (ref 8.4–10.2)
CHLORIDE SERPL-SCNC: 97 MMOL/L (ref 96–108)
CO2 SERPL-SCNC: 21 MMOL/L (ref 21–32)
CREAT SERPL-MCNC: 0.62 MG/DL (ref 0.6–1.3)
ERYTHROCYTE [DISTWIDTH] IN BLOOD BY AUTOMATED COUNT: 11.3 % (ref 11.6–15.1)
GFR SERPL CREATININE-BSD FRML MDRD: 103 ML/MIN/1.73SQ M
GLUCOSE SERPL-MCNC: 152 MG/DL (ref 65–140)
GLUCOSE SERPL-MCNC: 155 MG/DL (ref 65–140)
GLUCOSE SERPL-MCNC: 194 MG/DL (ref 65–140)
HCT VFR BLD AUTO: 49.7 % (ref 36.5–49.3)
HGB BLD-MCNC: 17.3 G/DL (ref 12–17)
MCH RBC QN AUTO: 30 PG (ref 26.8–34.3)
MCHC RBC AUTO-ENTMCNC: 34.8 G/DL (ref 31.4–37.4)
MCV RBC AUTO: 86 FL (ref 82–98)
PLATELET # BLD AUTO: 262 THOUSANDS/UL (ref 149–390)
PMV BLD AUTO: 9 FL (ref 8.9–12.7)
POTASSIUM SERPL-SCNC: 4.3 MMOL/L (ref 3.5–5.3)
PROT SERPL-MCNC: 7.2 G/DL (ref 6.4–8.4)
RBC # BLD AUTO: 5.76 MILLION/UL (ref 3.88–5.62)
SODIUM SERPL-SCNC: 132 MMOL/L (ref 135–147)
WBC # BLD AUTO: 8.91 THOUSAND/UL (ref 4.31–10.16)

## 2024-01-03 PROCEDURE — 80053 COMPREHEN METABOLIC PANEL: CPT | Performed by: INTERNAL MEDICINE

## 2024-01-03 PROCEDURE — 99239 HOSP IP/OBS DSCHRG MGMT >30: CPT | Performed by: INTERNAL MEDICINE

## 2024-01-03 PROCEDURE — 85027 COMPLETE CBC AUTOMATED: CPT | Performed by: INTERNAL MEDICINE

## 2024-01-03 PROCEDURE — 82948 REAGENT STRIP/BLOOD GLUCOSE: CPT

## 2024-01-03 PROCEDURE — 99232 SBSQ HOSP IP/OBS MODERATE 35: CPT | Performed by: STUDENT IN AN ORGANIZED HEALTH CARE EDUCATION/TRAINING PROGRAM

## 2024-01-03 PROCEDURE — 99222 1ST HOSP IP/OBS MODERATE 55: CPT | Performed by: PHYSICIAN ASSISTANT

## 2024-01-03 RX ADMIN — PRAVASTATIN SODIUM 20 MG: 20 TABLET ORAL at 08:10

## 2024-01-03 RX ADMIN — LISINOPRIL 2.5 MG: 2.5 TABLET ORAL at 08:10

## 2024-01-03 RX ADMIN — SODIUM CHLORIDE, SODIUM LACTATE, POTASSIUM CHLORIDE, AND CALCIUM CHLORIDE 125 ML/HR: .6; .31; .03; .02 INJECTION, SOLUTION INTRAVENOUS at 06:42

## 2024-01-03 RX ADMIN — INSULIN LISPRO 1 UNITS: 100 INJECTION, SOLUTION INTRAVENOUS; SUBCUTANEOUS at 08:10

## 2024-01-03 RX ADMIN — ENOXAPARIN SODIUM 40 MG: 40 INJECTION SUBCUTANEOUS at 08:10

## 2024-01-03 RX ADMIN — INSULIN LISPRO 1 UNITS: 100 INJECTION, SOLUTION INTRAVENOUS; SUBCUTANEOUS at 12:05

## 2024-01-03 NOTE — DISCHARGE SUMMARY
Formerly [facility]  Discharge- Toro Adames Jr. 1955, 68 y.o. male MRN: [id number]  Unit/Bed#: [facility]-01 Encounter: [id number]  Primary Care Provider: Alissa Chowdhury MD   Date and time admitted to hospital: 1/1/2024 10:36 AM    * Acute pancreatitis  Assessment & Plan  Presented for evaluation of abdominal pain which has resolved at this time  CT abd/pelvis (1/1/24): Acute interstitial pancreatitis primarily involving the uncinate process with extension of inflammatory changes into the pancreaticoduodenal groove. No evidence of pancreatic ductal dilatation or organized peripancreatic fluid collection. Scattered parenchymal calcifications of the pancreas are in keeping with prior episodes of pancreatitis. Wall thickening and inflammatory changes along the distal common bile duct, favored to be reactive to pancreatitis. Infectious cholangitis may demonstrate an identical appearance on CT.  RUQ US (1/2/24): Small sludge in the gallbladder. No sonographic evidence for acute cholecystitis. Hepatomegaly/hepatic steatosis. Pancreas obscured by underlying bowel gas.   General surgery evaluation appreciated  Recommended for close outpatient follow-up for elective lap cholecystectomy  Recommended for outpatient CT with pancreas protocol in 6 to 8 weeks  GI evaluation appreciated  Recommended for outpatient follow-up with MRI/MRCP in 4 to 6 weeks    Acute cholangitis- Ruled Out  Assessment & Plan  Right upper quadrant abdominal discomfort and difficulty lying on his right side due to pain  Imaging as above  Remains stable off antibiotic therapy    Scarring of lung  Assessment & Plan  CT Chest (1/1/24): 2 small benign foci of atelectasis/scar in the left lower lobe with a few tiny inflammatory nodules/benign intrapulmonary lymph nodes, of no clinical significance. Mild diffuse bronchial wall thickening with scattered mild endobronchial debris, compatible with bronchitis  Abnormal findings likely  related to atelectasis as the patient has not been taking deep breaths due to abdominal pain.    Hyperlipidemia  Assessment & Plan  Continue home pravastatin 20 mg daily    Type 2 diabetes mellitus with hyperglycemia, without long-term current use of insulin (HCC)  Assessment & Plan  Lab Results   Component Value Date    HGBA1C 9.7 (H) 01/02/2024       Recent Labs     01/02/24  1613 01/02/24  2101 01/03/24  0727 01/03/24  1104   POCGLU 129 135 152* 194*         Blood Sugar Average: Last 72 hrs:  (P) [id number]  Resume home medications at discharge  Continue lisinopril 2.5 mg daily  Carb controlled diet      Medical Problems       Resolved Problems  Date Reviewed: 10/4/2023   None       Discharging Physician / Practitioner: Helena Bernal DO  PCP: Alissa Chowdhury MD  Admission Date:   Admission Orders (From admission, onward)       Ordered        01/01/24 1432  INPATIENT ADMISSION  Once                          Discharge Date: 01/03/24    Consultations During Hospital Stay:  GI, General Surgery    Procedures Performed:   None    Significant Findings / Test Results:   CT abd/pelvis (1/1/24): Acute interstitial pancreatitis primarily involving the uncinate process with extension of inflammatory changes into the pancreaticoduodenal groove. No evidence of pancreatic ductal dilatation or organized peripancreatic fluid collection. Scattered parenchymal calcifications of the pancreas are in keeping with prior episodes of pancreatitis. Wall thickening and inflammatory changes along the distal common bile duct, favored to be reactive to pancreatitis. Infectious cholangitis may demonstrate an identical appearance on CT.  RUQ US (1/2/24): Small sludge in the gallbladder. No sonographic evidence for acute cholecystitis. Hepatomegaly/hepatic steatosis. Pancreas obscured by underlying bowel gas.     Incidental Findings:   CT Chest (1/1/24): 2 small benign foci of atelectasis/scar in the left lower lobe with a  few tiny inflammatory nodules/benign intrapulmonary lymph nodes, of no clinical significance. Mild diffuse bronchial wall thickening with scattered mild endobronchial debris, compatible with bronchitis.   I reviewed the above mentioned incidental findings with the patient and/or family and they expressed understanding.    Test Results Pending at Discharge (will require follow up):   None     Outpatient Tests Requested:  Recommended for outpatient CT with pancreas protocol in 6 to 8 weeks  Recommended for outpatient follow-up with MRI/MRCP in 4 to 6 weeks    Complications:  None    Reason for Admission: Acute Pancreatitis     Hospital Course:   Toro Adames Jr. is a 68 y.o. male patient who originally presented to the hospital on 1/1/2024 due to abdominal pain. Please see H&P as documented by myself for complete details regarding history of presenting illness.  In brief, the patient has a past medical history of type 2 diabetes and hyperlipidemia who presented with a complaint of abdominal discomfort.  He noted that GI symptoms began several weeks ago and were initially intermittent but progressively worsened the last 2 to 3 days prior to admission.  He has a prior history of pancreatitis in 2015 but has had no further episodes since that time and denies any significant alcohol use.  Upon arrival, CT of the abdomen and pelvis was performed and demonstrated acute pancreatitis and possible infectious cholangitis.  The patient was therefore admitted to the medical floor and underwent general surgery and GI consultation.  His abdominal discomfort gradually improved during his hospitalization to the point he was able to tolerate a regular diet.  He was recommended for close outpatient follow-up with general surgery for elective cholecystectomy and repeat CT with pancreatic protocol in 6 to 8 weeks.  Additionally he was recommended for close outpatient GI follow-up and recommended for MRCP in 4 to 6 weeks.  The  "patient was otherwise discharged home in stable condition and all of his questions were answered prior to departure.  This is a brief discharge summary please see full medical record for more details.    Please see above list of diagnoses and related plan for additional information.     Condition at Discharge: stable    Discharge Day Visit / Exam:   Subjective: Patient resting comfortably in bed without any acute complaints.  He tolerated his breakfast and lunch without difficulty or return of his abdominal symptoms.  No significant overnight events reported by nursing.    Vitals: Blood Pressure: 128/85 (01/03/24 0656)  Pulse: 87 (01/03/24 0656)  Temperature: 97.9 °F (36.6 °C) (01/03/24 0656)  Temp Source: Oral (01/02/24 2229)  Respirations: 18 (01/03/24 0656)  Height: 5' 8\" (172.7 cm) (01/02/24 0300)  Weight - Scale: 69.9 kg (154 lb) (01/02/24 0300)  SpO2: 94 % (01/03/24 0656)    Exam:   Physical Exam  Vitals and nursing note reviewed.   Constitutional:       Appearance: Normal appearance. He is normal weight.   HENT:      Head: Normocephalic and atraumatic.      Mouth/Throat:      Mouth: Mucous membranes are moist.      Pharynx: Oropharynx is clear.   Eyes:      Extraocular Movements: Extraocular movements intact.      Conjunctiva/sclera: Conjunctivae normal.   Cardiovascular:      Rate and Rhythm: Normal rate and regular rhythm.      Pulses: Normal pulses.      Heart sounds: Normal heart sounds.   Pulmonary:      Effort: Pulmonary effort is normal.      Breath sounds: Normal breath sounds. No wheezing.   Abdominal:      General: Bowel sounds are normal. There is no distension.      Palpations: Abdomen is soft.      Tenderness: There is no abdominal tenderness.   Musculoskeletal:         General: Normal range of motion.      Cervical back: Normal range of motion and neck supple.   Skin:     General: Skin is warm and dry.   Neurological:      General: No focal deficit present.      Mental Status: He is alert and " oriented to person, place, and time. Mental status is at baseline.   Psychiatric:         Mood and Affect: Mood normal.         Behavior: Behavior normal.         Judgment: Judgment normal.          Discussion with Family: Updated  (wife) at bedside.    Discharge instructions/Information to patient and family:   See after visit summary for information provided to patient and family.      Provisions for Follow-Up Care:  See after visit summary for information related to follow-up care and any pertinent home health orders.      Mobility at time of Discharge:   Basic Mobility Inpatient Raw Score: 24  JH-HLM Goal: 8: Walk 250 feet or more  JH-HLM Achieved: 8: Walk 250 feet ot more  HLM Goal achieved. Continue to encourage appropriate mobility.     Disposition:   Home    Planned Readmission: None     Discharge Statement:  I spent > 35 minutes discharging the patient. This time was spent on the day of discharge. I had direct contact with the patient on the day of discharge. Greater than 50% of the total time was spent examining patient, answering all patient questions, arranging and discussing plan of care with patient as well as directly providing post-discharge instructions.  Additional time then spent on discharge activities.    Discharge Medications:  See after visit summary for reconciled discharge medications provided to patient and/or family.      **Please Note: This note may have been constructed using a voice recognition system**

## 2024-01-03 NOTE — ASSESSMENT & PLAN NOTE
Presented for evaluation of abdominal pain which has resolved at this time  CT abd/pelvis (1/1/24): Acute interstitial pancreatitis primarily involving the uncinate process with extension of inflammatory changes into the pancreaticoduodenal groove. No evidence of pancreatic ductal dilatation or organized peripancreatic fluid collection. Scattered parenchymal calcifications of the pancreas are in keeping with prior episodes of pancreatitis. Wall thickening and inflammatory changes along the distal common bile duct, favored to be reactive to pancreatitis. Infectious cholangitis may demonstrate an identical appearance on CT.  RUQ US (1/2/24): Small sludge in the gallbladder. No sonographic evidence for acute cholecystitis. Hepatomegaly/hepatic steatosis. Pancreas obscured by underlying bowel gas.   General surgery evaluation appreciated  Recommended for close outpatient follow-up for elective lap cholecystectomy  Recommended for outpatient CT with pancreas protocol in 6 to 8 weeks  GI evaluation appreciated  Recommended for outpatient follow-up with MRI/MRCP in 4 to 6 weeks

## 2024-01-03 NOTE — ASSESSMENT & PLAN NOTE
Right upper quadrant abdominal discomfort and difficulty lying on his right side due to pain  Imaging as above  Remains stable off antibiotic therapy

## 2024-01-03 NOTE — CASE MANAGEMENT
Case Management Assessment & Discharge Planning Note    Patient name Toro Adames Jr.  Location /-01 MRN [id number]  : 1955 Date 1/3/2024       Current Admission Date: 2024  Current Admission Diagnosis:Acute pancreatitis   Patient Active Problem List    Diagnosis Date Noted    Acute pancreatitis 2024    Acute cholangitis 2024    Scarring of lung 2024    Erythrocytosis 10/04/2023    Allergic rhinitis 2020    Chronic sinusitis 12/10/2019    Leukocytosis 12/10/2019    Muscle weakness 2019    Erectile dysfunction 2019    Primary osteoarthritis involving multiple joints 2019    Vitamin D deficiency 2019    Hyponatremia 2016    Type 2 diabetes mellitus with hyperglycemia, without long-term current use of insulin (HCC) 2016    Hyperlipidemia 2016      LOS (days): 2  Geometric Mean LOS (GMLOS) (days):   Days to GMLOS:     OBJECTIVE:    Risk of Unplanned Readmission Score: 8.04         Current admission status: Inpatient  Referral Reason: Other (Discharge planning)    Preferred Pharmacy:   [facility] #[id number]  [address]  [place] [facility]  [place] [address]  Phone: [phone] Fax: [phone]    [facility]  [address] Hu - [address] [place] [id number]  Phone: [phone] Fax: [phone]    Primary Care Provider: Alissa Chowdhury MD    Primary Insurance: [facility] [employer] [facility]  Secondary Insurance:     ASSESSMENT:  Active Health Care Proxies    There are no active Health Care Proxies on file.       Advance Directives  Does patient have a Health Care POA?: No  Was patient offered paperwork?: Yes  Does patient currently have a Health Care decision maker?: No  Does patient have Advance Directives?: No  Was patient offered paperwork?: Yes  Primary Contact: Yvonne Adames - spouse         Readmission Root Cause  30 Day  Readmission: No    Patient Information  Admitted from:: Home  Mental Status: Alert  During Assessment patient was accompanied by: Spouse  Assessment information provided by:: Patient  Primary Caregiver: Self  Support Systems: Spouse/significant other  County of Residence: [facility] do you live in?: [place]  Home entry access options. Select all that apply.: Stairs  Number of steps to enter home.: 2  Do the steps have railings?: Yes  Type of Current Residence: Other (Comment) (1 story home)  Living Arrangements: Lives w/ Spouse/significant other  Is patient a ?: No    Activities of Daily Living Prior to Admission  Functional Status: Independent  Completes ADLs independently?: Yes  Ambulates independently?: Yes  Does patient use assisted devices?: No  Does patient currently own DME?: No  Does patient have a history of Outpatient Therapy (PT/OT)?: No  Does the patient have a history of Short-Term Rehab?: No  Does patient have a history of HHC?: No  Does patient currently have HHC?: No         Patient Information Continued  Income Source: Employed  Does patient have prescription coverage?: Yes  Does patient receive dialysis treatments?: No  Does patient have a history of substance abuse?: No  Does patient have a history of Mental Health Diagnosis?: No         Means of Transportation  Means of Transport to Appts:: Drives Self      Housing Stability: Low Risk  (1/3/2024)    Housing Stability Vital Sign     Unable to Pay for Housing in the Last Year: No     Number of Places Lived in the Last Year: 1     Unstable Housing in the Last Year: No   Food Insecurity: No Food Insecurity (1/3/2024)    Hunger Vital Sign     Worried About Running Out of Food in the Last Year: Never true     Ran Out of Food in the Last Year: Never true   Transportation Needs: No Transportation Needs (1/3/2024)    PRAPARE - Transportation     Lack of Transportation (Medical): No     Lack of Transportation (Non-Medical): No    Utilities: Not At Risk (1/3/2024)    [facility] Utilities     Threatened with loss of utilities: No       DISCHARGE DETAILS:    Discharge planning discussed with:: Patient  Freedom of Choice: Yes     CM contacted family/caregiver?: Yes  Were Treatment Team discharge recommendations reviewed with patient/caregiver?: Yes  Did patient/caregiver verbalize understanding of patient care needs?: Yes  Were patient/caregiver advised of the risks associated with not following Treatment Team discharge recommendations?: Yes    Contacts  Patient Contacts: Yvonne - spouse  Relationship to Patient:: Family  Contact Method: In Person  Reason/Outcome: Discharge Planning    Requested Home Health Care         Is the patient interested in HHC at discharge?: No    DME Referral Provided  Referral made for DME?: No         Would you like to participate in our [facility] service program?  : No - Declined    Treatment Team Recommendation: Home  Discharge Destination Plan:: Home  Transport at Discharge : Family (Spouse)        Additional Comments: Pt and spouse deny any current CM discharge needs. Pt will return home with spouse when stable. CM to follow as needed.

## 2024-01-03 NOTE — NUTRITION
01/03/24 1428   Biochemical Data,Medical Tests, and Procedures   Biochemical Data/Medical Tests/Procedures Lab values reviewed;Meds reviewed   Labs (Comment) 1/2/24 HgA1c 9.7   Nutrition-Focused Physical Exam   Nutrition-Focused Physical Exam Findings RN skin assessment reviewed;No edema documented;No skin issues documented   Medical-Related Concerns type 2 diabetes, HLD, scarring of lung, vitamin D deficiency   Current PO Intake   Current Diet Order CCD 2, low fiber/low residue, low-fat diet, thin liquids   Current Meal Intake %   Estimated calorie intake compared to estimated need Nutrient needs are met.   Recommendations/Interventions   Malnutrition/BMI Present No   Summary Hemoglobin A1c 9.7.  Presents with abdominal pain.  Acute pancreatitis noted.  Past medical history significant for type 2 diabetes, HLD, scarring of lung, vitamin D deficiency.  Weight history reviewed.  No significant changes.  No edema.  No pressure areas.  Prescribed a CCD 2, low fiber/low residue, low-fat diet, thin liquids.  Meal completion 100%. Nutrient needs are met. Met with pt at bedside. He reports having a good appetite. Has 2 meals daily. NKFA. Denies dysphagia. His wife cooks and grocery shops. Reports weight is stable. He reports checking blood sugars irregularly, usually 200 mg/dL. Discussed diet as prescribed. He declines nutrition education and offers no nutrition questions at this time. RD to follow PRN.   Interventions/Recommendations Continue current diet order   Education Assessment   Education Patient declined nutrition education   Patient Nutrition Goals   Goal Improve to healthful diet

## 2024-01-03 NOTE — TELEPHONE ENCOUNTER
----- Message from Iva Rayo DO sent at 1/3/2024  3:28 PM EST -----  Regarding: Hospital Follow Up  Hello,     Patient was seen in GI consultation during recent [facility] hospitalization. He will require a follow up appointment in the next 4 weeks to ensure resolution of his pancreatitis and discuss follow up imaging. Please call patient to schedule. Thank you.     Iva Rayo DO, PGY-4  [facility] Gastroenterology Fellow

## 2024-01-03 NOTE — PROGRESS NOTES
Progress Note- Toro Adames Jr. 68 y.o. male MRN: [id number]    Unit/Bed#: -01 Encounter: [id number]      Assessment and Plan:    Mr. Toro Adames is a 68 year old male with a PMHx DMII, HLD, and prior pancreatitis in 2015 who initially presented on 1/1/24 with c/o ongoing abdominal discomfort. Lipase elevated and CT imaging demonstrating pancreatitis for which GI is consulted.      Epigastric/RUQ Abdominal Pain  Acute Interstitial Pancreatitis   Chronic Isolated Hyperbilirubinemia   Hepatic Steatosis     # Mild Acute Interstitial Pancreatitis  (BISAP Score 1): Patient presenting with epigastric abdominal pain with elevated lipase of 406 along with CT imaging demonstrating acute interstitial pancreatitis along with scattered parenchymal calcifications consistent with acute on chronic pancreatitis.  Patient with prior episode of pancreatitis in 2015 secondary to a single episode of binge drinking.  Patient with no further alcohol use and triglycerides within normal limits.  Medications reviewed. Initial CT imaging with no evidence of gallstones but did reveal wall thickening and inflammatory changes in the distal CBD.  LFTs within normal limits outside of chronic, mild elevation of bilirubin.  Bilirubin 1.47 on arrival and improved to 1.20 today.  Unclear etiology of pancreatitis at this time unfortunately no clinical concern for cholangitis at this time. RUQ US pain did reveal small amount of gallbladder sludge suggesting potential biliary component of pancreatitis.  Given lack of additional identifiable cause and chronic abdominal pain long elevated bilirubin, would recommend surgical intervention with cholecystectomy to prevent recurrent episodes.  Additionally given chronically elevated bilirubin and distal CBD wall thickening with focal areas of inflammation on initial CT, plan to repeat short interval MRI/MRCP within the next 4 to 6 weeks to evaluate underlying area and rule out malignancy. In the  interim, continue on with conservative management with slow advancement of diet and pain/symptom control.     Plan:   Continue IVF hydration; continue LR @ 125 cc/hr  Monitor LFTs to ensure improvement; avoid hepatotoxic agents  Monitor urine output along with BUN/sCr; maintain UO of >0.5 ml/kg/hour  Current full liquid diet; advance diet to low-fat/low residue diet  If worsening abdominal pain or signs of organ failure , consider repeat CT in 72 hr  Monitor off antibiotics; monitor WBC and trend fever curve  No indication to trend Lipase levels   General surgery consulted, appreciate recommendations -no plans for surgery this admission, follow-up outpatient  PRN pain and anti-emetics; additional symptom management per primary team  Outpatient follow-up, repeat imaging with MRI/MRCP in 4 to 6 weeks    # Hepatic Steatosis:  Right upper quadrant ultrasound revealed hepatic steatosis.  Recommend outpatient follow-up with consideration of ultrasound elastography to evaluate for underlying fibrosis to risk stratify.  Commend continued management of modifiable metabolic risk factors including diabetes per lipidemia.  ______________________________________________________________________    Subjective:     Patient seen and examined at bedside this morning.  Patient lying in bed comfortably in no acute distress or visible discomfort.  States abdominal pain has resolved.  Tolerated toast this morning without issue.  Denies any nausea/vomiting.  No fever/chills.  Patient would like to trial diet advancement.  Offers no complaints at this time.    Medication Administration - last 24 hours from 01/02/2024 0826 to 01/03/2024 0826         Date/Time Order Dose Route Action Action by     01/03/2024 0810 EST lisinopril (ZESTRIL) tablet 2.5 mg 2.5 mg Oral Given Zane Ng RN     01/02/2024 0837 EST lisinopril (ZESTRIL) tablet 2.5 mg 2.5 mg Oral Given Greg Jo RN     01/03/2024 0810 EST pravastatin (PRAVACHOL) tablet 20 mg 20  "mg Oral Given Zane Ng, ALICE     01/02/2024 0838 EST pravastatin (PRAVACHOL) tablet 20 mg 20 mg Oral Given Greg Jo, ALICE     01/03/2024 0642 EST lactated ringers infusion 125 mL/hr Intravenous New Bag Janelle Bernal, ALICE     01/02/2024 2106 EST lactated ringers infusion 125 mL/hr Intravenous New Bag Janelle Bernal, ALICE     01/02/2024 1023 EST lactated ringers infusion 125 mL/hr Intravenous New Bag Gerg Jo, ALICE     01/03/2024 0810 EST enoxaparin (LOVENOX) subcutaneous injection 40 mg 40 mg Subcutaneous Given Zane Ng RN     01/02/2024 0837 EST enoxaparin (LOVENOX) subcutaneous injection 40 mg 40 mg Subcutaneous Given Greg Jo RN     01/03/2024 0810 EST insulin lispro (HumaLOG) 100 units/mL subcutaneous injection 1-5 Units 1 Units Subcutaneous Given Zane Ng RN     01/02/2024 1645 EST insulin lispro (HumaLOG) 100 units/mL subcutaneous injection 1-5 Units 0 Units Subcutaneous Not Given Zane Ng RN     01/02/2024 1236 EST insulin lispro (HumaLOG) 100 units/mL subcutaneous injection 1-5 Units -- Subcutaneous Not Given Zane Ng, ALICE     01/02/2024 2105 EST insulin lispro (HumaLOG) 100 units/mL subcutaneous injection 1-5 Units -- Subcutaneous Not Given Janelle Bernal RN     01/02/2024 1506 EST morphine injection 4 mg 4 mg Intravenous Given Zane Ng RN     01/02/2024 1028 EST magnesium sulfate 2 g/50 mL IVPB (premix) 2 g 0 g Intravenous Stopped Greg Jo RN     01/02/2024 0839 EST magnesium sulfate 2 g/50 mL IVPB (premix) 2 g 2 g Intravenous New Bag Greg Jo RN            Objective:     Vitals: Blood pressure 128/85, pulse 87, temperature 97.9 °F (36.6 °C), resp. rate 18, height 5' 8\" (1.727 m), weight 69.9 kg (154 lb), SpO2 94%.,Body mass index is 23.42 kg/m².    No intake or output data in the 24 hours ending 01/03/24 3378    Physical Exam  Constitutional:       General: He is not in acute distress.     Appearance: He is normal weight. He is not ill-appearing.   HENT: "      Head: Normocephalic.      Nose: No congestion.   Eyes:      General: No scleral icterus.  Cardiovascular:      Rate and Rhythm: Normal rate.      Pulses: Normal pulses.   Pulmonary:      Effort: Pulmonary effort is normal.   Abdominal:      General: Abdomen is flat. Bowel sounds are normal. There is no distension.      Tenderness: There is no abdominal tenderness. There is no guarding or rebound.   Musculoskeletal:         General: Normal range of motion.      Cervical back: Normal range of motion.      Right lower leg: No edema.      Left lower leg: No edema.   Skin:     General: Skin is warm.      Capillary Refill: Capillary refill takes less than 2 seconds.      Coloration: Skin is not jaundiced or pale.   Neurological:      Mental Status: He is alert and oriented to person, place, and time.   Psychiatric:         Mood and Affect: Mood normal.         Behavior: Behavior normal.           Invasive Devices       Peripheral Intravenous Line  Duration             Peripheral IV 01/03/24 Right;Ventral (anterior) Forearm <1 day                    Lab Results:  Admission on 01/01/2024   Component Date Value    WBC 01/01/2024 12.15 (H)     RBC 01/01/2024 5.98 (H)     Hemoglobin 01/01/2024 18.0 (H)     Hematocrit 01/01/2024 52.6 (H)     MCV 01/01/2024 88     MCH 01/01/2024 30.1     MCHC 01/01/2024 34.2     RDW 01/01/2024 11.5 (L)     MPV 01/01/2024 9.2     Platelets 01/01/2024 290     nRBC 01/01/2024 0     Neutrophils Relative 01/01/2024 70     Immat GRANS % 01/01/2024 0     Lymphocytes Relative 01/01/2024 22     Monocytes Relative 01/01/2024 7     Eosinophils Relative 01/01/2024 1     Basophils Relative 01/01/2024 0     Neutrophils Absolute 01/01/2024 8.51 (H)     Immature Grans Absolute 01/01/2024 0.03     Lymphocytes Absolute 01/01/2024 2.61     Monocytes Absolute 01/01/2024 0.84     Eosinophils Absolute 01/01/2024 0.12     Basophils Absolute 01/01/2024 0.04     Sodium 01/01/2024 134 (L)     Potassium 01/01/2024  4.2     Chloride 01/01/2024 97     CO2 01/01/2024 23     ANION GAP 01/01/2024 14     BUN 01/01/2024 12     Creatinine 01/01/2024 0.66     Glucose 01/01/2024 225 (H)     Calcium 01/01/2024 9.3     AST 01/01/2024 11 (L)     ALT 01/01/2024 10     Alkaline Phosphatase 01/01/2024 76     Total Protein 01/01/2024 7.8     Albumin 01/01/2024 4.0     Total Bilirubin 01/01/2024 1.47 (H)     eGFR 01/01/2024 100     Lipase 01/01/2024 406 (H)     SARS-CoV-2 01/01/2024 Negative     INFLUENZA A PCR 01/01/2024 Negative     INFLUENZA B PCR 01/01/2024 Negative     RSV PCR 01/01/2024 Negative     Cholesterol 01/01/2024 210 (H)     Triglycerides 01/01/2024 277 (H)     HDL, Direct 01/01/2024 49     LDL Calculated 01/01/2024 106 (H)     Non-HDL-Chol (CHOL-HDL) 01/01/2024 161     POC Glucose 01/01/2024 151 (H)     POC Glucose 01/01/2024 164 (H)     Sodium 01/02/2024 137     Potassium 01/02/2024 4.8     Chloride 01/02/2024 97     CO2 01/02/2024 27     ANION GAP 01/02/2024 13     BUN 01/02/2024 13     Creatinine 01/02/2024 0.67     Glucose 01/02/2024 150 (H)     Calcium 01/02/2024 9.2     AST 01/02/2024 12 (L)     ALT 01/02/2024 8     Alkaline Phosphatase 01/02/2024 71     Total Protein 01/02/2024 7.4     Albumin 01/02/2024 3.9     Total Bilirubin 01/02/2024 1.32 (H)     eGFR 01/02/2024 100     Magnesium 01/02/2024 1.8 (L)     WBC 01/02/2024 9.17     RBC 01/02/2024 5.75 (H)     Hemoglobin 01/02/2024 17.2 (H)     Hematocrit 01/02/2024 50.2 (H)     MCV 01/02/2024 87     MCH 01/02/2024 29.9     MCHC 01/02/2024 34.3     RDW 01/02/2024 11.5 (L)     Platelets 01/02/2024 276     MPV 01/02/2024 9.1     Hemoglobin A1C 01/02/2024 9.7 (H)     EAG 01/02/2024 232     POC Glucose 01/02/2024 155 (H)     POC Glucose 01/02/2024 129     POC Glucose 01/02/2024 135     WBC 01/03/2024 8.91     RBC 01/03/2024 5.76 (H)     Hemoglobin 01/03/2024 17.3 (H)     Hematocrit 01/03/2024 49.7 (H)     MCV 01/03/2024 86     MCH 01/03/2024 30.0     MCHC 01/03/2024 34.8      RDW 01/03/2024 11.3 (L)     Platelets 01/03/2024 262     MPV 01/03/2024 9.0     Sodium 01/03/2024 132 (L)     Potassium 01/03/2024 4.3     Chloride 01/03/2024 97     CO2 01/03/2024 21     ANION GAP 01/03/2024 14     BUN 01/03/2024 11     Creatinine 01/03/2024 0.62     Glucose 01/03/2024 155 (H)     Calcium 01/03/2024 8.8     AST 01/03/2024 16     ALT 01/03/2024 9     Alkaline Phosphatase 01/03/2024 61     Total Protein 01/03/2024 7.2     Albumin 01/03/2024 3.7     Total Bilirubin 01/03/2024 1.20 (H)     eGFR 01/03/2024 103     POC Glucose 01/03/2024 152 (H)        Imaging Studies: I have personally reviewed pertinent imaging studies.

## 2024-01-03 NOTE — ASSESSMENT & PLAN NOTE
Lab Results   Component Value Date    HGBA1C 9.7 (H) 01/02/2024       Recent Labs     01/02/24  1613 01/02/24  2101 01/03/24  0727 01/03/24  1104   POCGLU 129 135 152* 194*         Blood Sugar Average: Last 72 hrs:  (P) [id number]  Resume home medications at discharge  Continue lisinopril 2.5 mg daily  Carb controlled diet

## 2024-01-03 NOTE — PROGRESS NOTES
"Progress Note - General Surgery   Toro Adames Jr. 68 y.o. male MRN: [id number]  Unit/Bed#: -01 Encounter: [id number]    ASSESSMENT:  68 year old male with PMH significant for pancreatitis and diabetes presents for the evaluation of recurrent pancreatitis   -AF, VSS  -Lipase 204 on admission  -Tbili downtrending 1.20 from 1.47 on admission  -Other LFTs within normal limits  -Leukocytosis  -Abdominal pain resolved, minimally tender with deep palpation to the epigastric region  -RUQ reviewed, there is small amount of sludge in the gallbladder no signs of acute cholecystitis or any biliary tree abnormalities concerning for cholangitis.     PLAN:  -Plan for close OP FU for elective lap cate, after Lucero discussed with the patient yesterday evening.  Will provide outpatient follow-up and discharge instructions.  -Outpatient CT with pancreas protocol in 6 to 8 weeks to further evaluate  -Medical management per SLIM  -Appreciate GI recs  -Pain control, advancement of diet per toleration (to low fat today), antiemetics prn  -Trend WBC, fever curve off abx (Rocephin d/c 1/2)  -D/w Dr. Barker    SUBJECTIVE:  With clears and full liquids, tolerating a diet.  No nausea denies any abdominal pain.  Feels back to his baseline.    OBJECTIVE:   Vitals:  Blood pressure 128/85, pulse 87, temperature 97.9 °F (36.6 °C), resp. rate 18, height 5' 8\" (1.727 m), weight 69.9 kg (154 lb), SpO2 94%.,Body mass index is 23.42 kg/m².    I/Os:    Intake/Output Summary (Last 24 hours) at 1/3/2024 1026  Last data filed at 1/3/2024 0900  Gross per 24 hour   Intake 340 ml   Output --   Net 340 ml       Lines/Drains:  Invasive Devices       Peripheral Intravenous Line  Duration             Peripheral IV 01/03/24 Right;Ventral (anterior) Forearm <1 day                    Physical Exam  Vitals reviewed.   Constitutional:       General: He is not in acute distress.     Appearance: He is not toxic-appearing.   HENT:      Head: Normocephalic " and atraumatic.   Cardiovascular:      Rate and Rhythm: Normal rate and regular rhythm.      Heart sounds: No murmur heard.  Pulmonary:      Effort: Pulmonary effort is normal.      Comments: CTA anterior lung fields BL  Abdominal:      General: There is no distension.      Palpations: Abdomen is soft.      Tenderness: There is abdominal tenderness (with deep palpation to epigastric region). There is no guarding or rebound.   Musculoskeletal:         General: No tenderness.      Cervical back: Neck supple.      Right lower leg: No edema.      Left lower leg: No edema.   Skin:     General: Skin is warm and dry.   Neurological:      Mental Status: He is alert.         Diagnostics:  I have personally reviewed pertinent lab results.     US right upper quadrant    Result Date: 1/2/2024  Impression: Small sludge in the gallbladder. No sonographic evidence for acute cholecystitis. Hepatomegaly/hepatic steatosis. Pancreas obscured by underlying bowel gas. Workstation performed: [id number]     CT chest wo contrast    Result Date: 1/2/2024  Impression: 2 small benign foci of atelectasis/scar in the left lower lobe with a few tiny inflammatory nodules/benign intrapulmonary lymph nodes, of no clinical significance. Mild diffuse bronchial wall thickening with scattered mild endobronchial debris, compatible with bronchitis. Workstation performed: [id number]     CT abdomen pelvis with contrast    Result Date: 1/1/2024  Impression: 1. Acute interstitial pancreatitis primarily involving the uncinate process with extension of inflammatory changes into the pancreaticoduodenal groove. No evidence of pancreatic ductal dilatation or organized peripancreatic fluid collection. Given that these inflammatory changes are somewhat focal, a short-term follow-up CT is recommended following improvement of symptoms to ensure resolution. Scattered parenchymal calcifications of the pancreas are in keeping with prior episodes of pancreatitis. 2.  Wall thickening and inflammatory changes along the distal common bile duct, favored to be reactive to pancreatitis described above. Infectious cholangitis may demonstrate an identical appearance on CT, however, there is no biliary dilatation. Clinical/laboratory correlation recommended. 3. Prominent liver, similar in appearance to prior exams. This is favored to be due in part to a Riedel's lobe (normal variant). 4. Mild linear opacities within the left lower lobe consistent with mild infectious/inflammatory process versus scarring. These are new from CT of December 2022. This can be reassessed on upcoming lung cancer screening CT. 5. Possible punctate nonobstructing calculus within the right kidney. The study was marked in EPIC for immediate notification. Workstation performed: [id number]     Recent Results (from the past 36 hour(s))   Comprehensive metabolic panel    Collection Time: 01/02/24  5:33 AM   Result Value Ref Range    Sodium 137 135 - 147 mmol/L    Potassium 4.8 3.5 - 5.3 mmol/L    Chloride 97 96 - 108 mmol/L    CO2 27 21 - 32 mmol/L    ANION GAP 13 mmol/L    BUN 13 5 - 25 mg/dL    Creatinine 0.67 0.60 - 1.30 mg/dL    Glucose 150 (H) 65 - 140 mg/dL    Calcium 9.2 8.4 - 10.2 mg/dL    AST 12 (L) 13 - 39 U/L    ALT 8 7 - 52 U/L    Alkaline Phosphatase 71 34 - 104 U/L    Total Protein 7.4 6.4 - 8.4 g/dL    Albumin 3.9 3.5 - 5.0 g/dL    Total Bilirubin 1.32 (H) 0.20 - 1.00 mg/dL    eGFR 100 ml/min/1.73sq m   Magnesium    Collection Time: 01/02/24  5:33 AM   Result Value Ref Range    Magnesium 1.8 (L) 1.9 - 2.7 mg/dL   CBC (With Platelets)    Collection Time: 01/02/24  5:33 AM   Result Value Ref Range    WBC 9.17 4.31 - 10.16 Thousand/uL    RBC 5.75 (H) 3.88 - 5.62 Million/uL    Hemoglobin 17.2 (H) 12.0 - 17.0 g/dL    Hematocrit 50.2 (H) 36.5 - 49.3 %    MCV 87 82 - 98 fL    MCH 29.9 26.8 - 34.3 pg    MCHC 34.3 31.4 - 37.4 g/dL    RDW 11.5 (L) 11.6 - 15.1 %    Platelets 276 149 - 390 Thousands/uL    MPV 9.1  8.9 - 12.7 fL   Hemoglobin A1c w/EAG Estimation (Orders if not completed within the last 90 days)    Collection Time: 01/02/24  5:33 AM   Result Value Ref Range    Hemoglobin A1C 9.7 (H) Normal 4.0-5.6%; PreDiabetic 5.7-6.4%; Diabetic >=6.5%; Glycemic control for adults with diabetes <7.0% %     mg/dl   Fingerstick Glucose (POCT)    Collection Time: 01/02/24  6:26 AM   Result Value Ref Range    POC Glucose 155 (H) 65 - 140 mg/dl   Fingerstick Glucose (POCT)    Collection Time: 01/02/24  4:13 PM   Result Value Ref Range    POC Glucose 129 65 - 140 mg/dl   Fingerstick Glucose (POCT)    Collection Time: 01/02/24  9:01 PM   Result Value Ref Range    POC Glucose 135 65 - 140 mg/dl   CBC and Platelet    Collection Time: 01/03/24  2:20 AM   Result Value Ref Range    WBC 8.91 4.31 - 10.16 Thousand/uL    RBC 5.76 (H) 3.88 - 5.62 Million/uL    Hemoglobin 17.3 (H) 12.0 - 17.0 g/dL    Hematocrit 49.7 (H) 36.5 - 49.3 %    MCV 86 82 - 98 fL    MCH 30.0 26.8 - 34.3 pg    MCHC 34.8 31.4 - 37.4 g/dL    RDW 11.3 (L) 11.6 - 15.1 %    Platelets 262 149 - 390 Thousands/uL    MPV 9.0 8.9 - 12.7 fL   Comprehensive metabolic panel    Collection Time: 01/03/24  2:20 AM   Result Value Ref Range    Sodium 132 (L) 135 - 147 mmol/L    Potassium 4.3 3.5 - 5.3 mmol/L    Chloride 97 96 - 108 mmol/L    CO2 21 21 - 32 mmol/L    ANION GAP 14 mmol/L    BUN 11 5 - 25 mg/dL    Creatinine 0.62 0.60 - 1.30 mg/dL    Glucose 155 (H) 65 - 140 mg/dL    Calcium 8.8 8.4 - 10.2 mg/dL    AST 16 13 - 39 U/L    ALT 9 7 - 52 U/L    Alkaline Phosphatase 61 34 - 104 U/L    Total Protein 7.2 6.4 - 8.4 g/dL    Albumin 3.7 3.5 - 5.0 g/dL    Total Bilirubin 1.20 (H) 0.20 - 1.00 mg/dL    eGFR 103 ml/min/1.73sq m   Fingerstick Glucose (POCT)    Collection Time: 01/03/24  7:27 AM   Result Value Ref Range    POC Glucose 152 (H) 65 - 140 mg/dl       Current Medications:  Scheduled Meds:  Current Facility-Administered Medications   Medication Dose Route Frequency  Provider Last Rate    acetaminophen  650 mg Oral Q6H PRN Helena Bernal, DO      enoxaparin  40 mg Subcutaneous Daily Helena Bernal, DO      insulin lispro  1-5 Units Subcutaneous TID AC Helena Bernal, DO      insulin lispro  1-5 Units Subcutaneous HS Helena Bernal, DO      lactated ringers  125 mL/hr Intravenous Continuous Helena Bernal,  mL/hr (01/03/24 0642)    lisinopril  2.5 mg Oral Daily Helena Bernal, DO      morphine injection  4 mg Intravenous Q4H PRN Helena Bernal, DO      ondansetron  4 mg Intravenous Q8H PRN Helena Bernal, DO      oxyCODONE  5 mg Oral Q4H PRN Helena Bernal, DO      pravastatin  20 mg Oral Daily Helena Bernal, DO       Continuous Infusions:lactated ringers, 125 mL/hr, Last Rate: 125 mL/hr (01/03/24 0642)      PRN Meds:    acetaminophen    morphine injection    ondansetron    oxyCODONE      Jessa Caldwell PA-C  1/3/2024

## 2024-01-04 NOTE — UTILIZATION REVIEW
NOTIFICATION OF ADMISSION DISCHARGE   This is a Notification of Discharge from [facility]. Please be advised that this patient has been discharge from our facility. Below you will find the admission and discharge date and time including the patient’s disposition.   UTILIZATION REVIEW CONTACT:  Bernarda Cobian  Utilization   Network Utilization Review Department  Phone: [phone] carefully listen to the prompts. All voicemails are confidential.  Email: [email or web address]     ADMISSION INFORMATION  PRESENTATION DATE: 1/1/2024 10:36 AM  OBERVATION ADMISSION DATE:   INPATIENT ADMISSION DATE: 1/1/24  2:32 PM   DISCHARGE DATE: 1/3/2024  2:52 PM   DISPOSITION:Home/Self Care    Network Utilization Review Department  ATTENTION: Please call with any questions or concerns to [phone] and carefully listen to the prompts so that you are directed to the right person. All voicemails are confidential.   For Discharge needs, contact Care Management DC Support Team at [phone] opt. [phone]  Send all requests for admission clinical reviews, approved or denied determinations and any other requests to dedicated fax number below belonging to the campus where the patient is receiving treatment. List of dedicated fax numbers for the Facilities:  FACILITY NAME UR FAX NUMBER   ADMISSION DENIALS (Administrative/Medical Necessity) [phone]   DISCHARGE SUPPORT TEAM ([facility]) [phone]   PARENT CHILD HEALTH (Maternity/NICU/Pediatrics) [phone]   [facility] [phone]   [facility] [phone]   [facility] [phone]   [facility] [phone]   [facility] [phone]   [facility] [phone]   [facility] [phone]   [facility] [phone]   [facility]  [facility] [phone]   [facility] [phone]   [facility] [phone]

## 2024-01-05 ENCOUNTER — TRANSITIONAL CARE MANAGEMENT (OUTPATIENT)
Dept: INTERNAL MEDICINE CLINIC | Facility: CLINIC | Age: 69
End: 2024-01-05

## 2024-01-15 ENCOUNTER — CONSULT (OUTPATIENT)
Dept: SURGERY | Facility: CLINIC | Age: 69
End: 2024-01-15
Payer: COMMERCIAL

## 2024-01-15 VITALS
OXYGEN SATURATION: 97 % | DIASTOLIC BLOOD PRESSURE: 78 MMHG | BODY MASS INDEX: 24.02 KG/M2 | HEART RATE: 115 BPM | SYSTOLIC BLOOD PRESSURE: 124 MMHG | TEMPERATURE: 96.6 F | WEIGHT: 158 LBS | RESPIRATION RATE: 18 BRPM

## 2024-01-15 DIAGNOSIS — R19.7 DIARRHEA, UNSPECIFIED TYPE: ICD-10-CM

## 2024-01-15 DIAGNOSIS — K85.90 ACUTE PANCREATITIS WITHOUT INFECTION OR NECROSIS, UNSPECIFIED PANCREATITIS TYPE: Primary | ICD-10-CM

## 2024-01-15 DIAGNOSIS — Z80.0 FAMILY HISTORY OF PANCREATIC CANCER: ICD-10-CM

## 2024-01-15 DIAGNOSIS — K82.8 GALLBLADDER SLUDGE: ICD-10-CM

## 2024-01-15 DIAGNOSIS — K42.9 UMBILICAL HERNIA WITHOUT OBSTRUCTION AND WITHOUT GANGRENE: ICD-10-CM

## 2024-01-15 PROCEDURE — 99213 OFFICE O/P EST LOW 20 MIN: CPT | Performed by: SURGERY

## 2024-01-15 NOTE — PROGRESS NOTES
Progress Note - General Surgery   Toro Adames Jr. 68 y.o. male MRN: [id number]  Encounter: [id number]    Assessment/Plan     68M with prior ETOH pancreatitis, family history of pancreas cancer, now with a recent second episode of pancreatitis warranting hospital admission and progressive diarrhea. No ETOH intake in recent years, US suggests possible gallbladder sludge and consideration for possible biliary etiology of the pancreatitis. CT imaging showed some acute inflammation in the uncinate and pancreatoduodenal groove and associated thickening of the distal common bile duct and some changes in the pancreas consistent with chronic pancreatitis with calcifications but no pancreatic ductal changes or obvious mass lesion. He improved with conservative management.    Working plan is for repeat cross sectional imaging about 6-8 weeks after his resolution for further evaluation. Will discuss with radiology in terms of proceeding with a CT pancreas protocol versus an MRI/MRCP. We will order and arrange imaging and update the patient.     The patient has an upcoming visit with Dr. Olvera from GI. I have reached out to him to discuss the patient's case as well. Of note, the patient describes a year or more of worsening and persistent diarrhea with frequency and urgency that was not present in the past. He may warrant workup for pancreatic insufficiency or other causes of diarrhea.     The patient and I discussed the possible plan for interval cholecystectomy if the consensus after his repeat imaging and GI evaluation is that the gallbladder sludge is likely related to his recent pancreatitis and that he would benefit from elective surgery to reduce the chances of future recurrence. For now, the patient is doing well, other than his ongoing complaints of diarrhea. His exam is benign today. We will stay in touch with him as we obtain radiology and GI input on next steps.    Subjective       Chief Complaint   Patient  presents with    Consult     sludge in the gallbladder      Doing well since discharge, no recurrent abdominal or back pain, no fevers, no nausea/vomiting. He is back to work and he is eating. Continues to have diarrhea that he reports has been an issue for the past year or more but not in the past. He has frequency and urgency and irregular stools. He did have a colonoscopy last fall that showed some polyps requiring 5 year follow up. Patient does not recall discussing his diarrheal complaints with the GI team prior to that scope. He does have diabetes as well and his on oral agents to manage that. Has not been worked up for pancreatic insufficiency. As per assessment and plan, he did have calcifications in his pancreas suggesting an element of chronic pancreatitis although per the patient this is his second documented episode. First episode in 2015 in relation to binge drinking. Now no longer drinking at all, no smoking.  He mentions a 20-30 pound weight loss over 2 years and he says that he was trying for weight loss for the most part and since that weight loss his weight has stabilized at his current weight without additional weight loss. Reminds me of his aunt and uncle with history of pancreas cancer and his anxiety about the possibility. He is willing to undergo additional testing to rule this out. No prior abdominal surgeries. Does have a soft, asymptomatic, reducible umbilical hernia on exam.        Review of Systems   Constitutional: Negative.    Gastrointestinal:  Positive for diarrhea.   Psychiatric/Behavioral:  The patient is nervous/anxious.    All other systems reviewed and are negative.      The following portions of the patient's history were reviewed and updated as appropriate: allergies, current medications, past family history, past medical history, past social history, past surgical history, and problem list.    Objective      Blood pressure 124/78, pulse (!) 115, temperature (!) 96.6 °F (35.9  °C), temperature source Temporal, resp. rate 18, weight 71.7 kg (158 lb), SpO2 97%.   Physical Exam  Vitals reviewed.   Constitutional:       General: He is not in acute distress.     Appearance: He is not toxic-appearing.   HENT:      Head: Normocephalic.      Mouth/Throat:      Mouth: Mucous membranes are moist.   Eyes:      Pupils: Pupils are equal, round, and reactive to light.   Cardiovascular:      Rate and Rhythm: Tachycardia present.   Pulmonary:      Effort: Pulmonary effort is normal.   Abdominal:      General: Abdomen is flat. There is no distension.      Palpations: Abdomen is soft. There is no mass.      Tenderness: There is no abdominal tenderness. There is no guarding or rebound.      Hernia: A hernia is present.      Comments: Soft reducible umbilical hernia, nontender   Musculoskeletal:         General: Normal range of motion.      Cervical back: Normal range of motion.   Skin:     General: Skin is warm.      Capillary Refill: Capillary refill takes less than 2 seconds.      Coloration: Skin is not jaundiced.   Neurological:      General: No focal deficit present.      Mental Status: He is alert.   Psychiatric:         Mood and Affect: Mood normal.         Signature:  Ike Barker MD  Date: 1/15/2024 Time: 1:28 PM

## 2024-01-18 DIAGNOSIS — E11.65 TYPE 2 DIABETES MELLITUS WITH HYPERGLYCEMIA, WITH LONG-TERM CURRENT USE OF INSULIN (HCC): ICD-10-CM

## 2024-01-18 DIAGNOSIS — Z79.4 TYPE 2 DIABETES MELLITUS WITH HYPERGLYCEMIA, WITH LONG-TERM CURRENT USE OF INSULIN (HCC): ICD-10-CM

## 2024-01-18 DIAGNOSIS — E78.2 MIXED HYPERLIPIDEMIA: ICD-10-CM

## 2024-01-19 ENCOUNTER — OFFICE VISIT (OUTPATIENT)
Age: 69
End: 2024-01-19
Payer: COMMERCIAL

## 2024-01-19 VITALS
SYSTOLIC BLOOD PRESSURE: 121 MMHG | HEIGHT: 68 IN | TEMPERATURE: 98.3 F | DIASTOLIC BLOOD PRESSURE: 64 MMHG | BODY MASS INDEX: 24.46 KG/M2 | HEART RATE: 78 BPM | WEIGHT: 161.4 LBS | OXYGEN SATURATION: 99 %

## 2024-01-19 DIAGNOSIS — K82.8 GALLBLADDER SLUDGE: ICD-10-CM

## 2024-01-19 DIAGNOSIS — K85.10 ACUTE BILIARY PANCREATITIS, UNSPECIFIED COMPLICATION STATUS: Primary | ICD-10-CM

## 2024-01-19 DIAGNOSIS — R19.7 DIARRHEA, UNSPECIFIED TYPE: ICD-10-CM

## 2024-01-19 PROBLEM — K83.09 ACUTE CHOLANGITIS: Status: RESOLVED | Noted: 2024-01-01 | Resolved: 2024-01-19

## 2024-01-19 PROBLEM — Z80.0 FAMILY HISTORY OF PANCREATIC CANCER: Status: RESOLVED | Noted: 2024-01-15 | Resolved: 2024-01-19

## 2024-01-19 PROCEDURE — 99214 OFFICE O/P EST MOD 30 MIN: CPT | Performed by: STUDENT IN AN ORGANIZED HEALTH CARE EDUCATION/TRAINING PROGRAM

## 2024-01-19 RX ORDER — PRAVASTATIN SODIUM 20 MG
TABLET ORAL
Qty: 90 TABLET | Refills: 1 | Status: SHIPPED | OUTPATIENT
Start: 2024-01-19

## 2024-01-19 RX ORDER — GLIMEPIRIDE 2 MG/1
2 TABLET ORAL 2 TIMES DAILY
Qty: 180 TABLET | Refills: 1 | Status: SHIPPED | OUTPATIENT
Start: 2024-01-19

## 2024-01-19 RX ORDER — CANAGLIFLOZIN 100 MG/1
100 TABLET, FILM COATED ORAL
Qty: 90 TABLET | Refills: 1 | OUTPATIENT
Start: 2024-01-19

## 2024-01-19 NOTE — PROGRESS NOTES
[facility]  Outpatient Follow-up  Encounter: [id number]    PATIENT INFO     Name: Toro Adames Jr.  YOB: 1955   Age: 68 y.o.   Sex: male   MRN: [id number]    ASSESSMENT & PLAN     Toro Adames Jr. is a 68 y.o. male with recent acute uncomplicated pancreatitis suspected to be biliary in etiology, evidence of gallbladder sludge, and diarrhea.  While he does have second-degree family members who have had pancreatitis as well, he has no first-degree family members or other risk factors.  He does not consume alcohol.  Evidence of gallbladder sludge seen on recent CT which is the likely cause.  Fortunately, acute episode appears to have resolved.  He does have some complaints of diarrhea which may be indicative of exocrine pancreatic insufficiency.  There was evidence of calcification seen on CT which may be suggestive of chronic pancreatitis.  Other potential etiologies include IBD versus microscopic colitis versus medication side effect as he is on metformin for diabetes.  Low suspicion for celiac sprue or infectious etiologies.    We will repeat CT scan of the abdomen with and without contrast in 2 weeks to assess resolution of acute pancreatitis and evaluate for any other abnormalities that may explain the cause for acute pancreatitis  While he may benefit from MRI/MRCP, we will hold off for now as he does have metal in his left lower extremity  We could also consider EUS but will await results of CT scan to determine if this is indicated  We will also check stool studies including fecal calprotectin and fecal pancreatic elastase level  Follow-up with surgery once imaging has been completed and other etiologies have been ruled out at which time he could be considered for cholecystectomy    1. Acute biliary pancreatitis, unspecified complication status    2. Gallbladder sludge    3. Diarrhea, unspecified type      Orders Placed This Encounter   Procedures     Pancreatic elastase, fecal    Calprotectin,Fecal    CT abdomen pelvis w wo contrast       FOLLOW-UP: 3 months    HISTORY OF PRESENT ILLNESS       Toro Adames Jr. is a 68 y.o. male who presents to GI office for follow-up.  Patient was recently hospitalized for acute pancreatitis which was uncomplicated.  It was believed to be biliary in pathology.  Fortunately, he improved with conservative management.  He is currently asymptomatic with the exception of diarrhea which has been ongoing for the last year or so.  Denies any alcohol consumption.  He had 1 prior episode of acute pancreatitis in 2018.  He reports paternal aunt and uncle who also had episodes of acute pancreatitis.  Denies any other family members who had pancreatitis.  No clear family history of pancreatic cancer.  Imaging in the hospital showed findings of acute pancreatitis and evidence of gallbladder sludge.  Cause for his pancreatitis was believed to be biliary.    In regards to his diarrhea, he reports alternating bowel habits for the last year or so.  When he does have bowel movements, they tend to be more loose and sometimes float to the surface.  Denies liquid diarrhea.  He does report some urgency with coffee intake.  There are days that he may not have any bowel movements.  Denies any hematochezia or melena.  He had recent colonoscopy on 9/11/2023 with total of 3 subcentimeter polyps removed and moderate diverticulosis and hemorrhoids.     ENDOSCOPIC HISTORY     UPPER ENDOSCOPY: None    COLONOSCOPY: 9/11/2023 with 3 subcentimeter polyps removed, moderate diverticulosis, hemorrhoids; recommended repeat in 5 years    REVIEW OF SYSTEMS     CONSTITUTIONAL: Denies any fever, chills, rigors, and weight loss  HEENT: No earache or tinnitus, denies hearing loss or visual disturbances  CARDIOVASCULAR: No chest pain or palpitations  RESPIRATORY: Denies any cough, hemoptysis, shortness of breath or dyspnea on exertion  GASTROINTESTINAL: As noted in the  History of Present Illness  GENITOURINARY: No problems with urination, denies any hematuria or dysuria  NEUROLOGIC: No dizziness or vertigo, denies headaches   MUSCULOSKELETAL: Denies any muscle or joint pain   SKIN: Denies skin rashes or itching  ENDOCRINE: Denies excessive thirst, denies intolerance to heat or cold  PSYCHOSOCIAL: Denies depression or anxiety, denies any recent memory loss     Historical Information   Past Medical History:   Diagnosis Date    Diabetes mellitus (HCC)     Hyperlipidemia     Hypertension     Pancreatitis     Pancreatitis     Tobacco abuse 2016     Past Surgical History:   Procedure Laterality Date    ANKLE SURGERY      INCISION AND DRAINAGE OF WOUND Right 6/15/2016    Procedure: INCISION AND DRAINAGE (I&D) EXTREMITY;  Surgeon: Andrew Israel MD;  Location: [facility];  Service:      Social History   Social History     Substance and Sexual Activity   Alcohol Use No     Social History     Substance and Sexual Activity   Drug Use No     Social History     Tobacco Use   Smoking Status Former    Current packs/day: 0.00    Average packs/day: 2.0 packs/day for 20.0 years (40.0 ttl pk-yrs)    Types: Cigarettes    Start date:     Quit date:     Years since quittin.0   Smokeless Tobacco Never     Family History   Problem Relation Age of Onset    Diabetes Mother     Dementia Father        MEDICATIONS & ALLERGIES     Current Outpatient Medications   Medication Instructions    Canagliflozin (INVOKANA) 300 mg, Oral, Daily before breakfast    ergocalciferol (VITAMIN D2) 50,000 Units, Oral, Every 14 days    glimepiride (AMARYL) 2 mg, Oral, 2 times daily    lisinopril (ZESTRIL) 2.5 mg tablet TAKE 1 TABLET BY MOUTH EVERY DAY    metFORMIN (GLUCOPHAGE) 850 mg, Oral, 3 times daily    pravastatin (PRAVACHOL) 20 mg tablet TAKE 1 TABLET BY MOUTH EVERY DAY    tadalafil (CIALIS) 5 mg, Oral, Daily PRN     Allergies   Allergen Reactions    Penicillins        PHYSICAL EXAM      Objective   Blood  "pressure 121/64, pulse 78, temperature 98.3 °F (36.8 °C), temperature source Temporal, height 5' 8\" (1.727 m), weight 73.2 kg (161 lb 6.4 oz), SpO2 99%. Body mass index is 24.54 kg/m².    General Appearance:   Alert, cooperative, no distress   HEENT:   Normocephalic, atraumatic, anicteric     Neck:   Supple, symmetrical, trachea midline   Lungs:   Equal chest rise, respirations unlabored    Heart:   Regular rate and rhythm   Abdomen:   Soft, non-tender, non-distended; normal bowel sounds; no masses, no organomegaly    Rectal:   Deferred    Extremities:   No cyanosis, clubbing or edema    Neuro:   Moves all 4 extremities    Skin:   No jaundice, rashes, or lesions      LABORATORY RESULTS     No visits with results within 1 Day(s) from this visit.   Latest known visit with results is:   Admission on 01/01/2024, Discharged on 01/03/2024   Component Date Value    WBC 01/01/2024 12.15 (H)     RBC 01/01/2024 5.98 (H)     Hemoglobin 01/01/2024 18.0 (H)     Hematocrit 01/01/2024 52.6 (H)     MCV 01/01/2024 88     MCH 01/01/2024 30.1     MCHC 01/01/2024 34.2     RDW 01/01/2024 11.5 (L)     MPV 01/01/2024 9.2     Platelets 01/01/2024 290     nRBC 01/01/2024 0     Neutrophils Relative 01/01/2024 70     Immat GRANS % 01/01/2024 0     Lymphocytes Relative 01/01/2024 22     Monocytes Relative 01/01/2024 7     Eosinophils Relative 01/01/2024 1     Basophils Relative 01/01/2024 0     Neutrophils Absolute 01/01/2024 8.51 (H)     Immature Grans Absolute 01/01/2024 0.03     Lymphocytes Absolute 01/01/2024 2.61     Monocytes Absolute 01/01/2024 0.84     Eosinophils Absolute 01/01/2024 0.12     Basophils Absolute 01/01/2024 0.04     Sodium 01/01/2024 134 (L)     Potassium 01/01/2024 4.2     Chloride 01/01/2024 97     CO2 01/01/2024 23     ANION GAP 01/01/2024 14     BUN 01/01/2024 12     Creatinine 01/01/2024 0.66     Glucose 01/01/2024 225 (H)     Calcium 01/01/2024 9.3     AST 01/01/2024 11 (L)     ALT 01/01/2024 10     Alkaline " Phosphatase 01/01/2024 76     Total Protein 01/01/2024 7.8     Albumin 01/01/2024 4.0     Total Bilirubin 01/01/2024 1.47 (H)     eGFR 01/01/2024 100     Lipase 01/01/2024 406 (H)     SARS-CoV-2 01/01/2024 Negative     INFLUENZA A PCR 01/01/2024 Negative     INFLUENZA B PCR 01/01/2024 Negative     RSV PCR 01/01/2024 Negative     Cholesterol 01/01/2024 210 (H)     Triglycerides 01/01/2024 277 (H)     HDL, Direct 01/01/2024 49     LDL Calculated 01/01/2024 106 (H)     Non-HDL-Chol (CHOL-HDL) 01/01/2024 161     POC Glucose 01/01/2024 151 (H)     POC Glucose 01/01/2024 164 (H)     Sodium 01/02/2024 137     Potassium 01/02/2024 4.8     Chloride 01/02/2024 97     CO2 01/02/2024 27     ANION GAP 01/02/2024 13     BUN 01/02/2024 13     Creatinine 01/02/2024 0.67     Glucose 01/02/2024 150 (H)     Calcium 01/02/2024 9.2     AST 01/02/2024 12 (L)     ALT 01/02/2024 8     Alkaline Phosphatase 01/02/2024 71     Total Protein 01/02/2024 7.4     Albumin 01/02/2024 3.9     Total Bilirubin 01/02/2024 1.32 (H)     eGFR 01/02/2024 100     Magnesium 01/02/2024 1.8 (L)     WBC 01/02/2024 9.17     RBC 01/02/2024 5.75 (H)     Hemoglobin 01/02/2024 17.2 (H)     Hematocrit 01/02/2024 50.2 (H)     MCV 01/02/2024 87     MCH 01/02/2024 29.9     MCHC 01/02/2024 34.3     RDW 01/02/2024 11.5 (L)     Platelets 01/02/2024 276     MPV 01/02/2024 9.1     Hemoglobin A1C 01/02/2024 9.7 (H)     EAG 01/02/2024 232     POC Glucose 01/02/2024 155 (H)     POC Glucose 01/02/2024 129     POC Glucose 01/02/2024 135     WBC 01/03/2024 8.91     RBC 01/03/2024 5.76 (H)     Hemoglobin 01/03/2024 17.3 (H)     Hematocrit 01/03/2024 49.7 (H)     MCV 01/03/2024 86     MCH 01/03/2024 30.0     MCHC 01/03/2024 34.8     RDW 01/03/2024 11.3 (L)     Platelets 01/03/2024 262     MPV 01/03/2024 9.0     Sodium 01/03/2024 132 (L)     Potassium 01/03/2024 4.3     Chloride 01/03/2024 97     CO2 01/03/2024 21     ANION GAP 01/03/2024 14     BUN 01/03/2024 11     Creatinine  01/03/2024 0.62     Glucose 01/03/2024 155 (H)     Calcium 01/03/2024 8.8     AST 01/03/2024 16     ALT 01/03/2024 9     Alkaline Phosphatase 01/03/2024 61     Total Protein 01/03/2024 7.2     Albumin 01/03/2024 3.7     Total Bilirubin 01/03/2024 1.20 (H)     eGFR 01/03/2024 103     POC Glucose 01/03/2024 152 (H)     POC Glucose 01/03/2024 194 (H)         IMAGING RESULTS     US right upper quadrant    Result Date: 1/2/2024  Narrative: RIGHT UPPER QUADRANT ULTRASOUND INDICATION:     r/o gallstone pancreatitis. COMPARISON:  None TECHNIQUE:   Real-time ultrasound of the right upper quadrant was performed with a curvilinear transducer with both volumetric sweeps and still imaging techniques. FINDINGS: PANCREAS: Obscured by overlying bowel gas. AORTA AND IVC:  Visualized portions are normal for patient age. LIVER: Size: Enlarged.  The liver measures 16.8 cm in the midclavicular line. Contour:  Surface contour is smooth. Parenchyma:  There is mild diffuse increased echogenicity with smooth echotexture, without significant beam attenuation or loss of periportal echogenicity.  Most consistent with mild hepatic steatosis. No liver mass identified. Limited imaging of the main portal vein shows it to be patent and hepatopetal. BILIARY: Small sludge in the gallbladder. No gallbladder wall thickening, pericholecystic fluid, or sonographic Gaviria sign elicited. No intrahepatic biliary dilatation. CBD measures 4.0 mm. No choledocholithiasis. KIDNEY: Right kidney measures 11.9 x 7.0 x 6.6 cm. Volume 288.5 mL Kidney within normal limits. ASCITES:   None.     Impression: Small sludge in the gallbladder. No sonographic evidence for acute cholecystitis. Hepatomegaly/hepatic steatosis. Pancreas obscured by underlying bowel gas. Workstation performed: [id number]     CT chest wo contrast    Result Date: 1/2/2024  Narrative: CT CHEST WITHOUT IV CONTRAST INDICATION:   abnormal finding on ct ABD/PELVIS. Per my review of the medical record,  patient presented with abdominal pain with history of pancreatitis in 2015. Mild opacity in the left lower lobe on abdomen CT. COMPARISON: CXR 3/5/2015, abdomen CT 1/1/2024 and 12/7/2022. TECHNIQUE: Chest CT without intravenous contrast.  Axial, sagittal, coronal 2D reformats and coronal MIPS from source data. Radiation dose length product (DLP):  299 mGy-cm . Radiation dose exposure minimized using iterative reconstruction and automated exposure control. FINDINGS: LUNGS: 2 small foci of benign linear atelectasis/scar in the juxtapleural lateral basal left lower lobe with a few tiny inflammatory nodules/intrapulmonary lymph nodes (3/150, 161). No acute disease. Moderate emphysema. AIRWAYS: Mild diffuse bronchial wall thickening with scattered mucous in the airways. PLEURA:  Unremarkable. HEART/GREAT VESSELS: Normal heart size. MEDIASTINUM AND YUNIER:  Unremarkable. CHEST WALL AND LOWER NECK: Mild gynecomastia. UPPER ABDOMEN: Colonic diverticuli. Right renal cysts. Punctate calcification in the pancreas. Mild inflammation of the peripancreatic fat adjacent to the uncinate process, better shown on the abdomen CT. OSSEOUS STRUCTURES: Mild degenerative disease in the spine.     Impression: 2 small benign foci of atelectasis/scar in the left lower lobe with a few tiny inflammatory nodules/benign intrapulmonary lymph nodes, of no clinical significance. Mild diffuse bronchial wall thickening with scattered mild endobronchial debris, compatible with bronchitis. Workstation performed: [id number]     CT abdomen pelvis with contrast    Result Date: 1/1/2024  Narrative: CT ABDOMEN AND PELVIS WITH IV CONTRAST INDICATION:   Abdominal pain. COMPARISON: Multiple prior abdomen/pelvis CTs with the most recent being obtained 12/7/2022. TECHNIQUE:  CT examination of the abdomen and pelvis was performed. Multiplanar 2D reformatted images were created from the source data. This examination, like all CT scans performed in the [facility]  [facility], was performed utilizing techniques to minimize radiation dose exposure, including the use of iterative reconstruction and automated exposure control. Radiation dose length product (DLP) for this visit:  656.33 mGy-cm IV Contrast:  100 mL of iohexol (OMNIPAQUE) Enteric Contrast:  Enteric contrast was not administered. FINDINGS: ABDOMEN LOWER CHEST: Linear densities within the periphery of the left lower lobe likely reflect scarring/atelectasis. Mild inflammatory process may produce a similar appearance. This can be reassessed on upcoming lung cancer screening CT. LIVER/BILIARY TREE: Liver appears mildly enlarged, however, contour suggest this may be secondary to a Riedel's lobe which is a normal variant. GALLBLADDER: No calcified gallstones. No CT evidence of pericholecystic inflammation. The wall of the distal common bile duct is mildly thickened. There are adjacent inflammatory changes. Findings are likely reactive secondary to pancreatitis described below. Cholangitis is not entirely excluded, however, there is no biliary dilatation. SPLEEN:  Unremarkable. PANCREAS: Scattered punctate calcifications likely reflecting sequelae of prior pancreatitis. No evidence of pancreatic ductal dilatation. Peripancreatic inflammatory changes are seen primarily overlying the uncinate process and extending into the pancreaticoduodenal groove. No organized peripancreatic fluid collections. ADRENAL GLANDS:  Unremarkable. KIDNEYS/URETERS:  No hydronephrosis. Possible punctate nonobstructing calculus is seen within the upper pole the right kidney (601:98). One or more sharply circumscribed subcentimeter renal hypodensities are present, too small to accurately characterize,  and statistically most likely benign findings. According to recent literature (Radiology 2019) no further workup of these findings is recommended. STOMACH AND BOWEL: No evidence of small bowel obstruction. There is colonic diverticulosis without  evidence of acute diverticulitis. APPENDIX:  No findings to suggest appendicitis. ABDOMINOPELVIC CAVITY:  No ascites.  No pneumoperitoneum. A few prominent periportal lymph nodes are noted, likely reactive. VESSELS: Atherosclerotic calcifications are noted within the abdominal aorta and branching vessels. PELVIS REPRODUCTIVE ORGANS:  Unremarkable for patient's age. URINARY BLADDER:  Unremarkable. ABDOMINAL WALL/INGUINAL REGIONS:  Unremarkable. OSSEOUS STRUCTURES:  No acute fracture or destructive osseous lesion. Grade 1 anterolisthesis of L4 on L5.     Impression: 1. Acute interstitial pancreatitis primarily involving the uncinate process with extension of inflammatory changes into the pancreaticoduodenal groove. No evidence of pancreatic ductal dilatation or organized peripancreatic fluid collection. Given that these inflammatory changes are somewhat focal, a short-term follow-up CT is recommended following improvement of symptoms to ensure resolution. Scattered parenchymal calcifications of the pancreas are in keeping with prior episodes of pancreatitis. 2. Wall thickening and inflammatory changes along the distal common bile duct, favored to be reactive to pancreatitis described above. Infectious cholangitis may demonstrate an identical appearance on CT, however, there is no biliary dilatation. Clinical/laboratory correlation recommended. 3. Prominent liver, similar in appearance to prior exams. This is favored to be due in part to a Riedel's lobe (normal variant). 4. Mild linear opacities within the left lower lobe consistent with mild infectious/inflammatory process versus scarring. These are new from CT of December 2022. This can be reassessed on upcoming lung cancer screening CT. 5. Possible punctate nonobstructing calculus within the right kidney. The study was marked in EPIC for immediate notification. Workstation performed: [id number]     I have personally reviewed any available and pertinent imaging study  reports.      Gerardo Olvera D.O.  [facility]  Division of Gastroenterology & Hepatology  Available on [employer]  [email or web address]    ** Please Note: This note is constructed using a voice recognition dictation system. **

## 2024-01-19 NOTE — PATIENT INSTRUCTIONS
We will check a CT scan of your pancreas in 2 weeks  Depending on the findings, we will consider ordering MRI  We will also check a stool test  Please get this done at your earliest convenience  Please keep your appointment with the surgeon

## 2024-01-23 ENCOUNTER — APPOINTMENT (OUTPATIENT)
Age: 69
End: 2024-01-23
Payer: COMMERCIAL

## 2024-01-23 DIAGNOSIS — R19.7 DIARRHEA, UNSPECIFIED TYPE: ICD-10-CM

## 2024-01-23 DIAGNOSIS — K85.10 ACUTE BILIARY PANCREATITIS, UNSPECIFIED COMPLICATION STATUS: ICD-10-CM

## 2024-01-23 PROCEDURE — 82653 EL-1 FECAL QUANTITATIVE: CPT

## 2024-01-23 PROCEDURE — 83993 ASSAY FOR CALPROTECTIN FECAL: CPT

## 2024-01-26 LAB
CALPROTECTIN STL-MCNT: 12 UG/G (ref 0–120)
ELASTASE PANC STL-MCNT: 128 UG ELAST./G

## 2024-01-30 ENCOUNTER — TELEPHONE (OUTPATIENT)
Dept: INTERNAL MEDICINE CLINIC | Facility: CLINIC | Age: 69
End: 2024-01-30

## 2024-01-30 DIAGNOSIS — E11.65 TYPE 2 DIABETES MELLITUS WITH HYPERGLYCEMIA, WITH LONG-TERM CURRENT USE OF INSULIN (HCC): ICD-10-CM

## 2024-01-30 DIAGNOSIS — Z79.4 TYPE 2 DIABETES MELLITUS WITH HYPERGLYCEMIA, WITH LONG-TERM CURRENT USE OF INSULIN (HCC): ICD-10-CM

## 2024-02-02 ENCOUNTER — HOSPITAL ENCOUNTER (OUTPATIENT)
Dept: CT IMAGING | Facility: HOSPITAL | Age: 69
End: 2024-02-02
Attending: STUDENT IN AN ORGANIZED HEALTH CARE EDUCATION/TRAINING PROGRAM
Payer: COMMERCIAL

## 2024-02-02 DIAGNOSIS — K85.10 ACUTE BILIARY PANCREATITIS, UNSPECIFIED COMPLICATION STATUS: ICD-10-CM

## 2024-02-02 PROCEDURE — G1004 CDSM NDSC: HCPCS

## 2024-02-02 PROCEDURE — 74178 CT ABD&PLV WO CNTR FLWD CNTR: CPT

## 2024-02-02 RX ADMIN — IOHEXOL 100 ML: 350 INJECTION, SOLUTION INTRAVENOUS at 07:30

## 2024-02-05 DIAGNOSIS — R19.7 DIARRHEA DUE TO MALABSORPTION: ICD-10-CM

## 2024-02-05 DIAGNOSIS — K90.9 DIARRHEA DUE TO MALABSORPTION: ICD-10-CM

## 2024-02-05 DIAGNOSIS — K86.81 EXOCRINE PANCREATIC INSUFFICIENCY: Primary | ICD-10-CM

## 2024-02-06 ENCOUNTER — TELEPHONE (OUTPATIENT)
Age: 69
End: 2024-02-06

## 2024-02-06 NOTE — TELEPHONE ENCOUNTER
Patients GI provider:  Dr. Olvera    Number to return call: [phone]    Reason for call: Pt calling to inform the cost of the pancreatic medication paired with the cost of the other medications he takes will quickly put him into a spot of his insurance no longer covering the medications. Pt does not wish to take the prescribed medication because of this. Contact pt directly with suggestions.    Scheduled procedure/appointment date if applicable: 5/7/2024 - Follow Up -

## 2024-02-28 ENCOUNTER — RA CDI HCC (OUTPATIENT)
Dept: OTHER | Facility: HOSPITAL | Age: 69
End: 2024-02-28

## 2024-02-28 ENCOUNTER — OFFICE VISIT (OUTPATIENT)
Dept: SURGERY | Facility: CLINIC | Age: 69
End: 2024-02-28
Payer: COMMERCIAL

## 2024-02-28 VITALS
BODY MASS INDEX: 24.13 KG/M2 | SYSTOLIC BLOOD PRESSURE: 120 MMHG | TEMPERATURE: 97.5 F | DIASTOLIC BLOOD PRESSURE: 78 MMHG | RESPIRATION RATE: 16 BRPM | HEART RATE: 98 BPM | HEIGHT: 68 IN | OXYGEN SATURATION: 98 % | WEIGHT: 159.2 LBS

## 2024-02-28 DIAGNOSIS — K85.10 GALLSTONE PANCREATITIS: Primary | ICD-10-CM

## 2024-02-28 PROCEDURE — 99214 OFFICE O/P EST MOD 30 MIN: CPT | Performed by: SURGERY

## 2024-02-28 RX ORDER — SODIUM CHLORIDE, SODIUM LACTATE, POTASSIUM CHLORIDE, CALCIUM CHLORIDE 600; 310; 30; 20 MG/100ML; MG/100ML; MG/100ML; MG/100ML
125 INJECTION, SOLUTION INTRAVENOUS CONTINUOUS
OUTPATIENT
Start: 2024-02-28

## 2024-02-28 RX ORDER — HEPARIN SODIUM 5000 [USP'U]/ML
5000 INJECTION, SOLUTION INTRAVENOUS; SUBCUTANEOUS ONCE
OUTPATIENT
Start: 2024-02-28 | End: 2024-02-28

## 2024-02-28 RX ORDER — CHLORHEXIDINE GLUCONATE 4 G/100ML
SOLUTION TOPICAL DAILY PRN
OUTPATIENT
Start: 2024-02-28

## 2024-02-29 PROBLEM — K85.10 GALLSTONE PANCREATITIS: Status: ACTIVE | Noted: 2024-02-29

## 2024-03-01 NOTE — PROGRESS NOTES
Progress Note - General Surgery   Toro Adames Jr. 68 y.o. male MRN: [id number]  Encounter: [id number]    Assessment/Plan     68M with prior ETOH pancreatitis, family history of pancreas cancer, now with a recent second episode of pancreatitis warranting hospital admission and progressive diarrhea. No ETOH intake in recent years, US suggests possible gallbladder sludge and consideration for possible biliary etiology of the pancreatitis. He improved with conservative management.     Interval CT pancreas protocol with resolution of pancreatitis and no evidence of a pancreatic malignancy. Cholelithiasis was noted. The patient continues to follow with Dr. Olvera regarding his recent diarrhea.    Plan is for interval cholecystectomy in order to prevent recurrent biliary pancreatitis. The patient would like to proceed. We discussed risks and benefits, typical intraoperative and perioperative course. All questions answered. Informed consent obtained, booking orders placed, updated labs and EKG ordered.     Patient would like to consider an endocrine consult postoperatively regarding his diabetes management, particularly with his goal to address his diarrhea and potentially transition off of metformin, and to avoid any medications that could lead to recurrent pancreatitis.     Subjective       Chief Complaint   Patient presents with    Pre-op Exam      Diarrhea slowly improving. Patient feels reassured that his CT did not show a pancreas lesion. CT imaging reviewed showing cholelithiasis and resolved pancreatitis. Feels a bit torn about proceeding with cholecystectomy but after extended discussion, we decided to proceed.       Review of Systems   Constitutional:  Positive for unexpected weight change.   Gastrointestinal:  Positive for diarrhea.   Psychiatric/Behavioral:  The patient is nervous/anxious.    All other systems reviewed and are negative.      The following portions of the patient's history were reviewed and  "updated as appropriate: allergies, current medications, past family history, past medical history, past social history, past surgical history, and problem list.    Objective      Blood pressure 120/78, pulse 98, temperature 97.5 °F (36.4 °C), temperature source Temporal, resp. rate 16, height 5' 8\" (1.727 m), weight 72.2 kg (159 lb 3.2 oz), SpO2 98%.   Physical Exam  Vitals reviewed.   Constitutional:       General: He is not in acute distress.     Appearance: He is not toxic-appearing.   HENT:      Head: Normocephalic.      Mouth/Throat:      Mouth: Mucous membranes are moist.   Eyes:      Pupils: Pupils are equal, round, and reactive to light.   Cardiovascular:      Rate and Rhythm: Normal rate.   Pulmonary:      Effort: Pulmonary effort is normal. No respiratory distress.   Abdominal:      General: Abdomen is flat. There is no distension.      Palpations: Abdomen is soft.      Tenderness: There is no guarding or rebound.      Hernia: A hernia is present.      Comments: Umbilical hernia, soft, reducible, nontender   Musculoskeletal:         General: Normal range of motion.      Cervical back: Normal range of motion.   Skin:     General: Skin is warm.      Capillary Refill: Capillary refill takes less than 2 seconds.      Coloration: Skin is not jaundiced.   Neurological:      General: No focal deficit present.      Mental Status: He is alert.   Psychiatric:         Mood and Affect: Mood normal.         Signature:  Ike Barker MD  Date: 2/29/2024 Time: 8:40 PM   "

## 2024-03-05 ENCOUNTER — TELEPHONE (OUTPATIENT)
Dept: SURGERY | Facility: CLINIC | Age: 69
End: 2024-03-05

## 2024-03-05 ENCOUNTER — HOSPITAL ENCOUNTER (OUTPATIENT)
Facility: HOSPITAL | Age: 69
Setting detail: OUTPATIENT SURGERY
End: 2024-03-05
Attending: SURGERY | Admitting: SURGERY
Payer: COMMERCIAL

## 2024-03-05 NOTE — TELEPHONE ENCOUNTER
Called patient in regards to scheduling his lap cate surgery and several dates were given. Patient stated that he wanted to talk to his wife first and see what is best for them.   He stated that it no rush .  Patient was provided my number.

## 2024-03-07 ENCOUNTER — APPOINTMENT (OUTPATIENT)
Dept: LAB | Facility: CLINIC | Age: 69
End: 2024-03-07
Payer: COMMERCIAL

## 2024-03-07 DIAGNOSIS — E11.65 TYPE 2 DIABETES MELLITUS WITH HYPERGLYCEMIA, WITHOUT LONG-TERM CURRENT USE OF INSULIN (HCC): ICD-10-CM

## 2024-03-07 DIAGNOSIS — Z12.5 SCREENING FOR PROSTATE CANCER: ICD-10-CM

## 2024-03-07 DIAGNOSIS — E78.2 MIXED HYPERLIPIDEMIA: ICD-10-CM

## 2024-03-07 DIAGNOSIS — K85.10 GALLSTONE PANCREATITIS: ICD-10-CM

## 2024-03-07 DIAGNOSIS — M15.9 PRIMARY OSTEOARTHRITIS INVOLVING MULTIPLE JOINTS: ICD-10-CM

## 2024-03-07 DIAGNOSIS — D75.1 ERYTHROCYTOSIS: ICD-10-CM

## 2024-03-07 DIAGNOSIS — E55.9 VITAMIN D DEFICIENCY: ICD-10-CM

## 2024-03-07 LAB
ALBUMIN SERPL BCP-MCNC: 4 G/DL (ref 3.5–5)
ALP SERPL-CCNC: 69 U/L (ref 34–104)
ALT SERPL W P-5'-P-CCNC: 13 U/L (ref 7–52)
ANION GAP SERPL CALCULATED.3IONS-SCNC: 12 MMOL/L
AST SERPL W P-5'-P-CCNC: 15 U/L (ref 13–39)
BASOPHILS # BLD AUTO: 0.08 THOUSANDS/ÂΜL (ref 0–0.1)
BASOPHILS NFR BLD AUTO: 1 % (ref 0–1)
BILIRUB DIRECT SERPL-MCNC: 0.16 MG/DL (ref 0–0.2)
BILIRUB SERPL-MCNC: 1.04 MG/DL (ref 0.2–1)
BUN SERPL-MCNC: 12 MG/DL (ref 5–25)
CALCIUM SERPL-MCNC: 9.1 MG/DL (ref 8.4–10.2)
CHLORIDE SERPL-SCNC: 97 MMOL/L (ref 96–108)
CHOLEST SERPL-MCNC: 196 MG/DL
CO2 SERPL-SCNC: 26 MMOL/L (ref 21–32)
CREAT SERPL-MCNC: 0.71 MG/DL (ref 0.6–1.3)
EOSINOPHIL # BLD AUTO: 0.5 THOUSAND/ÂΜL (ref 0–0.61)
EOSINOPHIL NFR BLD AUTO: 5 % (ref 0–6)
ERYTHROCYTE [DISTWIDTH] IN BLOOD BY AUTOMATED COUNT: 11.9 % (ref 11.6–15.1)
EST. AVERAGE GLUCOSE BLD GHB EST-MCNC: 240 MG/DL
GFR SERPL CREATININE-BSD FRML MDRD: 97 ML/MIN/1.73SQ M
GLUCOSE P FAST SERPL-MCNC: 264 MG/DL (ref 65–99)
HBA1C MFR BLD: 10 %
HCT VFR BLD AUTO: 51.5 % (ref 36.5–49.3)
HDLC SERPL-MCNC: 54 MG/DL
HGB BLD-MCNC: 17.8 G/DL (ref 12–17)
IMM GRANULOCYTES # BLD AUTO: 0.04 THOUSAND/UL (ref 0–0.2)
IMM GRANULOCYTES NFR BLD AUTO: 0 % (ref 0–2)
LDLC SERPL CALC-MCNC: 111 MG/DL (ref 0–100)
LIPASE SERPL-CCNC: 50 U/L (ref 11–82)
LYMPHOCYTES # BLD AUTO: 2.93 THOUSANDS/ÂΜL (ref 0.6–4.47)
LYMPHOCYTES NFR BLD AUTO: 31 % (ref 14–44)
MCH RBC QN AUTO: 29.7 PG (ref 26.8–34.3)
MCHC RBC AUTO-ENTMCNC: 34.6 G/DL (ref 31.4–37.4)
MCV RBC AUTO: 86 FL (ref 82–98)
MONOCYTES # BLD AUTO: 0.72 THOUSAND/ÂΜL (ref 0.17–1.22)
MONOCYTES NFR BLD AUTO: 8 % (ref 4–12)
NEUTROPHILS # BLD AUTO: 5.14 THOUSANDS/ÂΜL (ref 1.85–7.62)
NEUTS SEG NFR BLD AUTO: 55 % (ref 43–75)
NONHDLC SERPL-MCNC: 142 MG/DL
NRBC BLD AUTO-RTO: 0 /100 WBCS
PLATELET # BLD AUTO: 298 THOUSANDS/UL (ref 149–390)
PMV BLD AUTO: 10.3 FL (ref 8.9–12.7)
POTASSIUM SERPL-SCNC: 4.9 MMOL/L (ref 3.5–5.3)
PROT SERPL-MCNC: 7.3 G/DL (ref 6.4–8.4)
PSA SERPL-MCNC: 0.84 NG/ML (ref 0–4)
RBC # BLD AUTO: 6 MILLION/UL (ref 3.88–5.62)
SODIUM SERPL-SCNC: 135 MMOL/L (ref 135–147)
TRIGL SERPL-MCNC: 157 MG/DL
TSH SERPL DL<=0.05 MIU/L-ACNC: 0.83 UIU/ML (ref 0.45–4.5)
WBC # BLD AUTO: 9.41 THOUSAND/UL (ref 4.31–10.16)

## 2024-03-07 PROCEDURE — 36415 COLL VENOUS BLD VENIPUNCTURE: CPT

## 2024-03-07 PROCEDURE — 83690 ASSAY OF LIPASE: CPT

## 2024-03-07 PROCEDURE — 84443 ASSAY THYROID STIM HORMONE: CPT

## 2024-03-07 PROCEDURE — G0103 PSA SCREENING: HCPCS

## 2024-03-07 PROCEDURE — 82248 BILIRUBIN DIRECT: CPT

## 2024-03-07 PROCEDURE — 83036 HEMOGLOBIN GLYCOSYLATED A1C: CPT

## 2024-03-07 PROCEDURE — 85025 COMPLETE CBC W/AUTO DIFF WBC: CPT

## 2024-03-07 PROCEDURE — 80061 LIPID PANEL: CPT

## 2024-03-07 PROCEDURE — 80053 COMPREHEN METABOLIC PANEL: CPT

## 2024-03-14 ENCOUNTER — TELEPHONE (OUTPATIENT)
Dept: SURGERY | Facility: CLINIC | Age: 69
End: 2024-03-14

## 2024-03-14 NOTE — TELEPHONE ENCOUNTER
Left message for the patient to call the office in regards to rescheduling his upcoming surgery for 4/22/2024.

## 2024-03-21 ENCOUNTER — OFFICE VISIT (OUTPATIENT)
Dept: INTERNAL MEDICINE CLINIC | Facility: CLINIC | Age: 69
End: 2024-03-21
Payer: COMMERCIAL

## 2024-03-21 VITALS
SYSTOLIC BLOOD PRESSURE: 118 MMHG | BODY MASS INDEX: 24.23 KG/M2 | WEIGHT: 159.9 LBS | TEMPERATURE: 98 F | DIASTOLIC BLOOD PRESSURE: 76 MMHG | HEART RATE: 100 BPM | HEIGHT: 68 IN | OXYGEN SATURATION: 97 %

## 2024-03-21 DIAGNOSIS — E11.65 TYPE 2 DIABETES MELLITUS WITH HYPERGLYCEMIA, WITH LONG-TERM CURRENT USE OF INSULIN (HCC): Primary | ICD-10-CM

## 2024-03-21 DIAGNOSIS — K86.81 EXOCRINE PANCREATIC INSUFFICIENCY: ICD-10-CM

## 2024-03-21 DIAGNOSIS — E13.69 OTHER SPECIFIED DIABETES MELLITUS WITH OTHER SPECIFIED COMPLICATION, UNSPECIFIED WHETHER LONG TERM INSULIN USE (HCC): Primary | ICD-10-CM

## 2024-03-21 DIAGNOSIS — R19.7 DIARRHEA, UNSPECIFIED TYPE: ICD-10-CM

## 2024-03-21 DIAGNOSIS — E11.42 DIABETIC POLYNEUROPATHY ASSOCIATED WITH TYPE 2 DIABETES MELLITUS (HCC): ICD-10-CM

## 2024-03-21 DIAGNOSIS — K85.10 GALLSTONE PANCREATITIS: ICD-10-CM

## 2024-03-21 DIAGNOSIS — Z79.4 TYPE 2 DIABETES MELLITUS WITH HYPERGLYCEMIA, WITH LONG-TERM CURRENT USE OF INSULIN (HCC): Primary | ICD-10-CM

## 2024-03-21 DIAGNOSIS — K82.8 GALLBLADDER SLUDGE: ICD-10-CM

## 2024-03-21 DIAGNOSIS — E11.65 TYPE 2 DIABETES MELLITUS WITH HYPERGLYCEMIA, UNSPECIFIED WHETHER LONG TERM INSULIN USE (HCC): ICD-10-CM

## 2024-03-21 DIAGNOSIS — E11.65 TYPE 2 DIABETES MELLITUS WITH HYPERGLYCEMIA, WITH LONG-TERM CURRENT USE OF INSULIN (HCC): ICD-10-CM

## 2024-03-21 DIAGNOSIS — Z79.4 TYPE 2 DIABETES MELLITUS WITH HYPERGLYCEMIA, WITH LONG-TERM CURRENT USE OF INSULIN (HCC): ICD-10-CM

## 2024-03-21 PROBLEM — K85.90 ACUTE PANCREATITIS: Status: RESOLVED | Noted: 2024-01-01 | Resolved: 2024-03-21

## 2024-03-21 PROBLEM — E11.40 DIABETIC NEUROPATHY (HCC): Status: ACTIVE | Noted: 2024-03-21

## 2024-03-21 PROCEDURE — 99214 OFFICE O/P EST MOD 30 MIN: CPT | Performed by: FAMILY MEDICINE

## 2024-03-21 PROCEDURE — G0439 PPPS, SUBSEQ VISIT: HCPCS | Performed by: FAMILY MEDICINE

## 2024-03-21 RX ORDER — INSULIN GLARGINE 100 [IU]/ML
10 INJECTION, SOLUTION SUBCUTANEOUS DAILY
Qty: 15 ML | Refills: 1 | Status: SHIPPED | OUTPATIENT
Start: 2024-03-21

## 2024-03-21 RX ORDER — PEN NEEDLE, DIABETIC 32GX 5/32"
NEEDLE, DISPOSABLE MISCELLANEOUS 3 TIMES DAILY
Qty: 100 EACH | Refills: 0 | Status: SHIPPED | OUTPATIENT
Start: 2024-03-21 | End: 2024-03-22 | Stop reason: SDUPTHER

## 2024-03-21 RX ORDER — GLIMEPIRIDE 4 MG/1
4 TABLET ORAL 2 TIMES DAILY
Qty: 180 TABLET | Refills: 1 | Status: SHIPPED | OUTPATIENT
Start: 2024-03-21

## 2024-03-21 NOTE — PROGRESS NOTES
Assessment and Plan:     1. Type 2 diabetes mellitus with hyperglycemia, with long-term current use of insulin (Formerly [facility])  Assessment & Plan:    Lab Results   Component Value Date    HGBA1C 10.0 (H) 03/07/2024     His hemoglobin A1c is at 10%. He is currently on the maximum dosage of metformin, which is likely contributing to his symptoms. The Amaryl dosage will be increased to 4 mg, administered twice daily. Lantus will be added to his regimen at 10 units, to be taken once daily. Invokana will be discontinued, but the patient will continue taking metformin twice daily which she has tolerated much better than the 3 times a day.  Discussed watching carbohydrate intake more closely.  Discussed following up with ophthalmology.  Check feet daily especially with his history of the neuropathy.    Orders:  -     Ambulatory Referral to Ophthalmology; Future  -     glimepiride (AMARYL) 4 mg tablet; Take 1 tablet (4 mg total) by mouth 2 (two) times a day  -     Insulin Glargine Solostar (Basaglar KwikPen) 100 UNIT/ML SOPN; Inject 0.1 mL (10 Units total) under the skin in the morning  -     CBC and differential; Future  -     Comprehensive metabolic panel; Future    2. Diabetic polyneuropathy associated with type 2 diabetes mellitus (HCC)    3. Gallstone pancreatitis  Assessment & Plan:  Discussed his recent hospitalization with him.  Discussed that the gallbladder issues are likely the cause.  Discussed that recurrent pancreatitis can worsen the exocrine as well as endocrine function of the pancreas.  Watch for any recurrent symptoms.  Continue with his long-term avoidance of alcohol.      4. Gallbladder sludge  Assessment & Plan:  Recent surgeon note reviewed.  Discussed the recommendations for the removal of his gallbladder with him.  Discussed with him that this is indicated to prevent further issues with pancreatitis related to the gallbladder.  He does plan on proceeding with the surgery unless his wife develops worsening  health issues.      5. Diarrhea, unspecified type  Assessment & Plan:  Likely related to the metformin along with the pancreatic insufficiency.  Discussed with him decreasing the metformin to twice a day which she is tolerated better.  Discussed with him that the diarrhea may worsen somewhat after gallbladder removal.  Watch for dietary triggers.      6. Exocrine pancreatic insufficiency  Assessment & Plan:  Discussed GIs findings.  Discussed the addition of pancreatic enzymes but this is cost prohibitive for him at present.      7. Type 2 diabetes mellitus with hyperglycemia, unspecified whether long term insulin use (HCC)  -     metFORMIN (GLUCOPHAGE) 850 mg tablet; Take 1 tablet (850 mg total) by mouth 2 (two) times a day with meals    Orders and recommendations as noted above.  Reviewed recent hospitalization with him.  Reviewed the laboratory testing with him.  Will gradually increase the dose of the Lantus.  Hopefully will be able to streamline his diabetes medications in the future.  Discussed continued follow-up with GI.  Discussed upcoming gallbladder removal.  Will have him follow-up in about 3 to 4 weeks or sooner if needed.  Discussed trying to check his blood sugars at least twice a week since he does not feel able to check these daily.    Preventive health issues were discussed with patient, and age-appropriate screening tests were ordered as noted in patient's After Visit Summary.  Personalized health advice and appropriate referrals for health education or preventive services given if needed, as noted in patient's After Visit Summary.       Depression Screening and Follow-up Plan: Patient was screened for depression during today's encounter. They screened negative with a PHQ-2 score of 0.         History of Present Illness:     Toro Adames is a 68-year-old male who presents for routine follow-up visit, as well as Medicare wellness visit.    He was recently hospitalized for pancreatitis.  This was  thought to be related to likely gallbladder issues.  He had previous episode of pancreatitis many years ago related to alcohol use but has been abstaining long-term from alcohol.  This episode was not as severe as his last.  He did follow-up with GI as well as surgery.  Creon was recommended but this was cost prohibitive.  Specialists feel that the metformin is likely worsening his symptoms.  Does not follow his blood sugars regularly and is unwilling to do so.  Has decreased his metformin to only twice daily which she tolerates better from a diarrhea standpoint.    He has been on metformin for several years, but it has been causing gastrointestinal distress. He has been skipping the daytime dose of metformin on the 3rd day due to his habit of not eating lunch. He was previously on 2 tablets, which led to diarrhea, albeit not severe. He is resistant to insulin therapy and does not want to self-administer injections but is willing to consider this if it streamlined his medications. He reports feeling well when his blood glucose levels are elevated, but experiences discomfort when they drop. His blood glucose level was 300 mg/dL while on 2 tablets of glimepiride. He has been halving these tablets. He reports frequent urination and consumes diet iced tea. He does not regularly monitor his blood glucose levels. He consistently takes Invokana, finding it similar to Farxiga. He experienced dehydration when he first discontinued Farxiga years ago.  He wants to discontinue the Invokana if possible because of his concerns regarding potential side effects as well as the cost.    His gallbladder condition is uncontrolled. His surgery is scheduled for 04/29/2024. Preoperative instructions include discontinuation of Invokana several days prior to the procedure. A second CAT scan was performed, and the results were reassessed. He had pancreatitis 9 years ago.with most recent episode, he was hospitalized for 3 days. Upon  resolution of the inflammation, a follow-up examination revealed no concerning findings such as spots or scars.     He has cataract. His hands are cold all the time. He has not been taking Cialis since this has been an effective.     His niece had pancreatitis. His aunt had a spot on her pancreas by the time she . His paternal uncle had pancreatitis 3 times.    Patient Care Team:  Alissa Chowdhury MD as PCP - General (Family Medicine)  MD Ike Griffin MD (General Surgery)   Problem List:     Patient Active Problem List   Diagnosis   • Type 2 diabetes mellitus with hyperglycemia, with long-term current use of insulin (HCC)   • Hyperlipidemia   • Hyponatremia   • Vitamin D deficiency   • Muscle weakness   • Erectile dysfunction   • Primary osteoarthritis involving multiple joints   • Chronic sinusitis   • Leukocytosis   • Allergic rhinitis   • Erythrocytosis   • Scarring of lung   • Diarrhea   • Gallbladder sludge   • Umbilical hernia without obstruction and without gangrene   • Gallstone pancreatitis   • Diabetic neuropathy (HCC)   • Exocrine pancreatic insufficiency      Past Medical and Surgical History:     Past Medical History:   Diagnosis Date   • Acute pancreatitis 2024   • Diabetes mellitus (HCC)    • Hyperlipidemia    • Hypertension    • Pancreatitis    • Pancreatitis    • Tobacco abuse 2016     Past Surgical History:   Procedure Laterality Date   • ANKLE SURGERY     • INCISION AND DRAINAGE OF WOUND Right 6/15/2016    Procedure: INCISION AND DRAINAGE (I&D) EXTREMITY;  Surgeon: Andrew Israel MD;  Location: [facility];  Service:       Family History:     Family History   Problem Relation Age of Onset   • Diabetes Mother    • Dementia Father       Social History:     Social History     Socioeconomic History   • Marital status: /Civil Union     Spouse name: None   • Number of children: None   • Years of education: None   • Highest education level: None   Occupational  History   • None   Tobacco Use   • Smoking status: Former     Current packs/day: 0.00     Average packs/day: 2.0 packs/day for 20.0 years (40.0 ttl pk-yrs)     Types: Cigarettes     Start date:      Quit date:      Years since quittin.2   • Smokeless tobacco: Never   Vaping Use   • Vaping status: Never Used   Substance and Sexual Activity   • Alcohol use: No   • Drug use: No   • Sexual activity: Yes     Partners: Female   Other Topics Concern   • None   Social History Narrative   • None     Social Determinants of Health     Financial Resource Strain: Low Risk  (2023)    Overall Financial Resource Strain (CARDIA)    • Difficulty of Paying Living Expenses: Not hard at all   Food Insecurity: No Food Insecurity (3/21/2024)    Hunger Vital Sign    • Worried About Running Out of Food in the Last Year: Never true    • Ran Out of Food in the Last Year: Never true   Transportation Needs: No Transportation Needs (3/21/2024)    PRAPARE - Transportation    • Lack of Transportation (Medical): No    • Lack of Transportation (Non-Medical): No   Physical Activity: Not on file   Stress: Not on file   Social Connections: Not on file   Intimate Partner Violence: Not on file   Housing Stability: Low Risk  (3/21/2024)    Housing Stability Vital Sign    • Unable to Pay for Housing in the Last Year: No    • Number of Places Lived in the Last Year: 1    • Unstable Housing in the Last Year: No      Medications and Allergies:     Current Outpatient Medications   Medication Sig Dispense Refill   • ergocalciferol (VITAMIN D2) 50,000 units Take 1 capsule (50,000 Units total) by mouth every 14 (fourteen) days 6 capsule 3   • glimepiride (AMARYL) 4 mg tablet Take 1 tablet (4 mg total) by mouth 2 (two) times a day 180 tablet 1   • Insulin Glargine Solostar (Basaglar KwikPen) 100 UNIT/ML SOPN Inject 0.1 mL (10 Units total) under the skin in the morning 15 mL 1   • lisinopril (ZESTRIL) 2.5 mg tablet TAKE 1 TABLET BY MOUTH EVERY DAY  90 tablet 1   • metFORMIN (GLUCOPHAGE) 850 mg tablet Take 1 tablet (850 mg total) by mouth 2 (two) times a day with meals 270 tablet 1   • pravastatin (PRAVACHOL) 20 mg tablet TAKE 1 TABLET BY MOUTH EVERY DAY 90 tablet 1   • tadalafil (CIALIS) 5 MG tablet Take 1 tablet (5 mg total) by mouth daily as needed for erectile dysfunction 10 tablet 3   • BD Pen Needle Tran 2nd Gen 32G X 4 MM MISC USE 3 (THREE) TIMES A  each 0   • Insulin Pen Needle 32G X 4 MM MISC Use 3 (three) times a day 100 each 3     No current facility-administered medications for this visit.     Allergies   Allergen Reactions   • Penicillins       Immunizations:     Immunization History   Administered Date(s) Administered   • COVID-19 MODERNA VACC 0.5 ML IM 05/16/2021, 06/12/2021   • Influenza, high dose seasonal 0.7 mL 10/04/2023   • Influenza, recombinant, quadrivalent,injectable, preservative free 12/10/2019   • Pneumococcal Conjugate 13-Valent 04/02/2019   • Pneumococcal Polysaccharide PPV23 01/05/2023   • Tdap 06/12/2016, 06/12/2016      Health Maintenance:         Topic Date Due   • Lung Cancer Screening  01/01/2025   • Colorectal Cancer Screening  09/09/2028   • Hepatitis C Screening  Completed         Topic Date Due   • COVID-19 Vaccine (3 - 2023-24 season) 09/01/2023      Review of Systems:     Review of Systems   Constitutional:  Negative for activity change, appetite change, chills and fever.   HENT:  Negative for hearing loss.    Eyes:  Negative for pain and visual disturbance.   Respiratory:  Negative for cough, chest tightness and shortness of breath.    Cardiovascular:  Negative for chest pain and palpitations.   Gastrointestinal:  Positive for diarrhea. Negative for abdominal pain, blood in stool, nausea and vomiting.   Endocrine: Positive for polydipsia and polyuria. Negative for polyphagia.   Genitourinary:  Negative for dysuria and flank pain.   Musculoskeletal:  Negative for arthralgias and gait problem.   Skin:  Negative  "for color change.   Neurological:  Negative for dizziness and headaches.   Hematological:  Does not bruise/bleed easily.   Psychiatric/Behavioral:  Negative for behavioral problems and decreased concentration. The patient is not nervous/anxious.      Constitutional: Negative for activity change, appetite change, chills and fever.   HENT: Negative for hearing loss.    Eyes: Negative for pain and visual disturbance.   Respiratory: Negative for chest tightness and shortness of breath.    Cardiovascular: Negative for chest pain and palpitations.   Gastrointestinal: Negative for abdominal pain, blood in stool, nausea and vomiting.   Endocrine: Negative for polydipsia, polyphagia and polyuria.   Genitourinary: Negative for dysuria.   Musculoskeletal: Negative for arthralgias and gait problem.   Skin: Negative for color change.   Neurological: Negative for dizziness and headaches.   Hematological: Does not bruise/bleed easily.   Psychiatric/Behavioral: Negative for behavioral problems. The patient is not nervous/anxious.   Physical Exam:     /76 (BP Location: Left arm, Patient Position: Sitting, Cuff Size: Large)   Pulse 100   Temp 98 °F (36.7 °C)   Ht 5' 8\" (1.727 m)   Wt 72.5 kg (159 lb 14.4 oz)   SpO2 97%   BMI 24.31 kg/m²     Physical Exam  Vitals and nursing note reviewed.   Constitutional:       General: He is not in acute distress.     Appearance: He is well-developed and well-groomed.   HENT:      Head: Normocephalic and atraumatic.   Eyes:      Conjunctiva/sclera: Conjunctivae normal.   Cardiovascular:      Rate and Rhythm: Regular rhythm. Tachycardia present.      Pulses: no weak pulses.           Dorsalis pedis pulses are 1+ on the right side and 1+ on the left side.        Posterior tibial pulses are 1+ on the right side and 1+ on the left side.      Heart sounds: No murmur heard.     Comments: His heart rate is slightly elevated.  Pulmonary:      Effort: Pulmonary effort is normal. No respiratory " distress.      Breath sounds: Normal breath sounds.   Abdominal:      Palpations: Abdomen is soft.      Tenderness: There is no abdominal tenderness.   Musculoskeletal:         General: No swelling.      Cervical back: Neck supple.   Feet:      Right foot:      Skin integrity: No ulcer, skin breakdown, erythema, warmth, callus or dry skin.      Left foot:      Skin integrity: No ulcer, skin breakdown, erythema, warmth, callus or dry skin.   Skin:     General: Skin is warm and dry.      Capillary Refill: Capillary refill takes less than 2 seconds.   Neurological:      Mental Status: He is alert.   Psychiatric:         Mood and Affect: Mood and affect normal.         Speech: Speech normal.         Behavior: Behavior is cooperative.         Cognition and Memory: Cognition and memory normal.     Diabetic Foot Exam    Patient's shoes and socks removed.    Right Foot/Ankle   Right Foot Inspection  Skin Exam: skin normal and skin intact. No dry skin, no warmth, no callus, no erythema, no maceration, no abnormal color, no pre-ulcer, no ulcer and no callus.     Toe Exam: ROM and strength within normal limits.     Sensory   Monofilament testing: diminished    Vascular  Capillary refills: < 3 seconds  The right DP pulse is 1+. The right PT pulse is 1+.     Left Foot/Ankle  Left Foot Inspection  Skin Exam: skin normal and skin intact. No dry skin, no warmth, no erythema, no maceration, normal color, no pre-ulcer, no ulcer and no callus.     Toe Exam: ROM and strength within normal limits.     Sensory   Monofilament testing: diminished    Vascular  Capillary refills: < 3 seconds  The left DP pulse is 1+. The left PT pulse is 1+.     Assign Risk Category  No deformity present  Loss of protective sensation  No weak pulses  Risk: 1    Below is the patient's most recent value for Albumin, ALT, AST, BUN, Calcium, Chloride, Cholesterol, CO2, Creatinine, GFR, Glucose, HDL, Hematocrit, Hemoglobin, Hemoglobin A1C, LDL, Magnesium,  Phosphorus, Platelets, Potassium, PSA, Sodium, Triglycerides, and WBC.   Lab Results   Component Value Date    ALT 13 03/07/2024    AST 15 03/07/2024    BUN 12 03/07/2024    CALCIUM 9.1 03/07/2024    CL 97 03/07/2024    CHOL 138 03/11/2015    CO2 26 03/07/2024    CREATININE 0.71 03/07/2024    HDL 54 03/07/2024    HCT 51.5 (H) 03/07/2024    HGB 17.8 (H) 03/07/2024    HGBA1C 10.0 (H) 03/07/2024    MG 1.8 (L) 01/02/2024    PHOS 2.7 06/16/2016     03/07/2024    K 4.9 03/07/2024    PSA 0.84 03/07/2024     08/23/2015    TRIG 157 (H) 03/07/2024    WBC 9.41 03/07/2024     Note: for a comprehensive list of the patient's lab results, access the Results Review activity.      Medicare Screening Tests and Risk Assessments:     Toro is here for his Subsequent Wellness visit. Last Medicare Wellness visit information reviewed, patient interviewed and updates made to the record today.      Health Risk Assessment:   Patient rates overall health as good. Patient feels that their physical health rating is same. Patient is satisfied with their life. Eyesight was rated as same. Hearing was rated as same. Patient feels that their emotional and mental health rating is same. Patients states they are never, rarely angry. Patient states they are sometimes unusually tired/fatigued. Pain experienced in the last 7 days has been none. Patient states that he has experienced no weight loss or gain in last 6 months.     Depression Screening:   PHQ-2 Score: 0      Fall Risk Screening:   In the past year, patient has experienced: no history of falling in past year      Home Safety:  Patient does not have trouble with stairs inside or outside of their home. Patient has working smoke alarms and has working carbon monoxide detector. Home safety hazards include: none.     Nutrition:   Current diet is Regular.     Medications:   Patient is currently taking over-the-counter supplements. OTC medications include: see medication list. Patient is  able to manage medications.     Activities of Daily Living (ADLs)/Instrumental Activities of Daily Living (IADLs):   Walk and transfer into and out of bed and chair?: Yes  Dress and groom yourself?: Yes    Bathe or shower yourself?: Yes    Feed yourself? Yes  Do your laundry/housekeeping?: Yes  Manage your money, pay your bills and track your expenses?: Yes  Make your own meals?: Yes    Do your own shopping?: Yes    Previous Hospitalizations:   Any hospitalizations or ED visits within the last 12 months?: Yes    How many hospitalizations have you had in the last year?: 1-2    Advance Care Planning:   Living will: Yes    Durable POA for healthcare: Yes    Advanced directive: Yes    Provider agrees with end of life decisions: Yes      Cognitive Screening:   Provider or family/friend/caregiver concerned regarding cognition?: No    PREVENTIVE SCREENINGS      Cardiovascular Screening:    General: Screening Not Indicated and History Lipid Disorder      Diabetes Screening:     General: Screening Not Indicated and History Diabetes      Colorectal Cancer Screening:     General: Screening Current      Prostate Cancer Screening:    General: Screening Current      Osteoporosis Screening:    General: Screening Not Indicated      Abdominal Aortic Aneurysm (AAA) Screening:    Risk factors include: age between 65-74 yo and tobacco use        General: Screening Current      Lung Cancer Screening:     General: Screening Current      Hepatitis C Screening:    General: Screening Current    Screening, Brief Intervention, and Referral to Treatment (SBIRT)    Screening  Typical number of drinks in a day: 0  Typical number of drinks in a week: 0  Interpretation: Low risk drinking behavior.    Single Item Drug Screening:  How often have you used an illegal drug (including marijuana) or a prescription medication for non-medical reasons in the past year? never    Single Item Drug Screen Score: 0  Interpretation: Negative screen for possible  drug use disorder    Brief Intervention  Alcohol & drug use screenings were reviewed. No concerns regarding substance use disorder identified.         I have personally reviewed results with the patient.  Laboratory Studies  Labs were reviewed with the patient.  Hemoglobin A1c: 10%.  GFR: 97 ml/min.    Imaging  CT scan was reviewed with the patient.     Alissa Chowdhury MD    Transcribed for Alissa Cohwdhury MD, by Domi Shultz on 03/21/24 at 9:58 PM. Powered by [employer].

## 2024-03-21 NOTE — ASSESSMENT & PLAN NOTE
Discussed his recent hospitalization with him.  Discussed that the gallbladder issues are likely the cause.  Discussed that recurrent pancreatitis can worsen the exocrine as well as endocrine function of the pancreas.  Watch for any recurrent symptoms.  Continue with his long-term avoidance of alcohol.

## 2024-03-22 DIAGNOSIS — E11.65 TYPE 2 DIABETES MELLITUS WITH HYPERGLYCEMIA, WITH LONG-TERM CURRENT USE OF INSULIN (HCC): ICD-10-CM

## 2024-03-22 DIAGNOSIS — Z79.4 TYPE 2 DIABETES MELLITUS WITH HYPERGLYCEMIA, WITH LONG-TERM CURRENT USE OF INSULIN (HCC): ICD-10-CM

## 2024-03-22 RX ORDER — PEN NEEDLE, DIABETIC 32GX 5/32"
NEEDLE, DISPOSABLE MISCELLANEOUS 3 TIMES DAILY
Qty: 100 EACH | Refills: 3 | Status: SHIPPED | OUTPATIENT
Start: 2024-03-22

## 2024-03-22 NOTE — ASSESSMENT & PLAN NOTE
Lab Results   Component Value Date    HGBA1C 10.0 (H) 03/07/2024     His hemoglobin A1c is at 10%. He is currently on the maximum dosage of metformin, which is likely contributing to his symptoms. The Amaryl dosage will be increased to 4 mg, administered twice daily. Lantus will be added to his regimen at 10 units, to be taken once daily. Invokana will be discontinued, but the patient will continue taking metformin twice daily which she has tolerated much better than the 3 times a day.  Discussed watching carbohydrate intake more closely.  Discussed following up with ophthalmology.  Check feet daily especially with his history of the neuropathy.

## 2024-03-22 NOTE — ASSESSMENT & PLAN NOTE
Likely related to the metformin along with the pancreatic insufficiency.  Discussed with him decreasing the metformin to twice a day which she is tolerated better.  Discussed with him that the diarrhea may worsen somewhat after gallbladder removal.  Watch for dietary triggers.

## 2024-03-22 NOTE — ASSESSMENT & PLAN NOTE
Recent surgeon note reviewed.  Discussed the recommendations for the removal of his gallbladder with him.  Discussed with him that this is indicated to prevent further issues with pancreatitis related to the gallbladder.  He does plan on proceeding with the surgery unless his wife develops worsening health issues.

## 2024-03-22 NOTE — ASSESSMENT & PLAN NOTE
Discussed GIs findings.  Discussed the addition of pancreatic enzymes but this is cost prohibitive for him at present.

## 2024-04-10 ENCOUNTER — TELEPHONE (OUTPATIENT)
Dept: SURGERY | Facility: CLINIC | Age: 69
End: 2024-04-10

## 2024-04-10 ENCOUNTER — TELEPHONE (OUTPATIENT)
Dept: INTERNAL MEDICINE CLINIC | Facility: CLINIC | Age: 69
End: 2024-04-10

## 2024-04-10 NOTE — TELEPHONE ENCOUNTER
Dr Barker's office called to find out how many days to hold [place] Invokana . I looked into patients chart and that medication was discintinued on 03/07/2024

## 2024-04-10 NOTE — TELEPHONE ENCOUNTER
Spoke with the patient to check if he is still taking the Invokana.  Patient stated that he was taken off of that medication and has an upcoming surgical clearance on 4/23/2024. Kecia and Dr. Barker are aware

## 2024-04-12 NOTE — TELEPHONE ENCOUNTER
Please contact the office and tell them that this was discontinued because of cost and he was initiated on insulin therapy.

## 2024-04-18 ENCOUNTER — APPOINTMENT (OUTPATIENT)
Dept: LAB | Facility: CLINIC | Age: 69
End: 2024-04-18
Payer: COMMERCIAL

## 2024-04-18 ENCOUNTER — OFFICE VISIT (OUTPATIENT)
Dept: LAB | Facility: HOSPITAL | Age: 69
End: 2024-04-18
Payer: COMMERCIAL

## 2024-04-18 DIAGNOSIS — Z79.4 TYPE 2 DIABETES MELLITUS WITH HYPERGLYCEMIA, WITH LONG-TERM CURRENT USE OF INSULIN (HCC): ICD-10-CM

## 2024-04-18 DIAGNOSIS — K85.10 GALLSTONE PANCREATITIS: ICD-10-CM

## 2024-04-18 DIAGNOSIS — E11.65 TYPE 2 DIABETES MELLITUS WITH HYPERGLYCEMIA, WITH LONG-TERM CURRENT USE OF INSULIN (HCC): ICD-10-CM

## 2024-04-18 LAB
ALBUMIN SERPL BCP-MCNC: 3.7 G/DL (ref 3.5–5)
ALP SERPL-CCNC: 55 U/L (ref 34–104)
ALT SERPL W P-5'-P-CCNC: 12 U/L (ref 7–52)
ANION GAP SERPL CALCULATED.3IONS-SCNC: 10 MMOL/L (ref 4–13)
AST SERPL W P-5'-P-CCNC: 15 U/L (ref 13–39)
ATRIAL RATE: 89 BPM
BASOPHILS # BLD AUTO: 0.07 THOUSANDS/ÂΜL (ref 0–0.1)
BASOPHILS NFR BLD AUTO: 1 % (ref 0–1)
BILIRUB SERPL-MCNC: 1.56 MG/DL (ref 0.2–1)
BUN SERPL-MCNC: 11 MG/DL (ref 5–25)
CALCIUM SERPL-MCNC: 8.8 MG/DL (ref 8.4–10.2)
CHLORIDE SERPL-SCNC: 97 MMOL/L (ref 96–108)
CO2 SERPL-SCNC: 28 MMOL/L (ref 21–32)
CREAT SERPL-MCNC: 0.73 MG/DL (ref 0.6–1.3)
EOSINOPHIL # BLD AUTO: 0.43 THOUSAND/ÂΜL (ref 0–0.61)
EOSINOPHIL NFR BLD AUTO: 4 % (ref 0–6)
ERYTHROCYTE [DISTWIDTH] IN BLOOD BY AUTOMATED COUNT: 11.5 % (ref 11.6–15.1)
GFR SERPL CREATININE-BSD FRML MDRD: 96 ML/MIN/1.73SQ M
GLUCOSE P FAST SERPL-MCNC: 228 MG/DL (ref 65–99)
HCT VFR BLD AUTO: 48.1 % (ref 36.5–49.3)
HGB BLD-MCNC: 16.9 G/DL (ref 12–17)
IMM GRANULOCYTES # BLD AUTO: 0.01 THOUSAND/UL (ref 0–0.2)
IMM GRANULOCYTES NFR BLD AUTO: 0 % (ref 0–2)
LYMPHOCYTES # BLD AUTO: 2.82 THOUSANDS/ÂΜL (ref 0.6–4.47)
LYMPHOCYTES NFR BLD AUTO: 29 % (ref 14–44)
MCH RBC QN AUTO: 30.1 PG (ref 26.8–34.3)
MCHC RBC AUTO-ENTMCNC: 35.1 G/DL (ref 31.4–37.4)
MCV RBC AUTO: 86 FL (ref 82–98)
MONOCYTES # BLD AUTO: 0.8 THOUSAND/ÂΜL (ref 0.17–1.22)
MONOCYTES NFR BLD AUTO: 8 % (ref 4–12)
NEUTROPHILS # BLD AUTO: 5.56 THOUSANDS/ÂΜL (ref 1.85–7.62)
NEUTS SEG NFR BLD AUTO: 58 % (ref 43–75)
NRBC BLD AUTO-RTO: 0 /100 WBCS
P AXIS: 47 DEGREES
PLATELET # BLD AUTO: 289 THOUSANDS/UL (ref 149–390)
PMV BLD AUTO: 10 FL (ref 8.9–12.7)
POTASSIUM SERPL-SCNC: 5.1 MMOL/L (ref 3.5–5.3)
PR INTERVAL: 142 MS
PROT SERPL-MCNC: 6.7 G/DL (ref 6.4–8.4)
QRS AXIS: 12 DEGREES
QRSD INTERVAL: 80 MS
QT INTERVAL: 370 MS
QTC INTERVAL: 450 MS
RBC # BLD AUTO: 5.61 MILLION/UL (ref 3.88–5.62)
SODIUM SERPL-SCNC: 135 MMOL/L (ref 135–147)
T WAVE AXIS: 65 DEGREES
VENTRICULAR RATE: 89 BPM
WBC # BLD AUTO: 9.69 THOUSAND/UL (ref 4.31–10.16)

## 2024-04-18 PROCEDURE — 36415 COLL VENOUS BLD VENIPUNCTURE: CPT

## 2024-04-18 PROCEDURE — 93005 ELECTROCARDIOGRAM TRACING: CPT

## 2024-04-18 PROCEDURE — 80053 COMPREHEN METABOLIC PANEL: CPT

## 2024-04-18 PROCEDURE — 85025 COMPLETE CBC W/AUTO DIFF WBC: CPT

## 2024-04-18 PROCEDURE — 93010 ELECTROCARDIOGRAM REPORT: CPT | Performed by: INTERNAL MEDICINE

## 2024-04-19 ENCOUNTER — TELEPHONE (OUTPATIENT)
Dept: ADMINISTRATIVE | Facility: OTHER | Age: 69
End: 2024-04-19

## 2024-04-19 VITALS — BODY MASS INDEX: 23.95 KG/M2 | WEIGHT: 158 LBS | HEIGHT: 68 IN

## 2024-04-19 NOTE — TELEPHONE ENCOUNTER
04/19/24 3:17 PM    Patient contacted (left message) to bring Advance Directive, POLST, or Living Will document to next scheduled pcp visit.    Thank you.  Samira Mary  [facility]

## 2024-04-19 NOTE — PRE-PROCEDURE INSTRUCTIONS
Pre-Surgery Instructions:   Medication Instructions    ergocalciferol (VITAMIN D2) 50,000 units Stop taking 7 days prior to surgery.    glimepiride (AMARYL) 4 mg tablet Hold day of surgery.    Insulin Glargine Solostar (Basaglar KwikPen) 100 UNIT/ML SOPN Hold day of surgery.    lisinopril (ZESTRIL) 2.5 mg tablet Hold day of surgery.    metFORMIN (GLUCOPHAGE) 850 mg tablet Hold day of surgery.    pravastatin (PRAVACHOL) 20 mg tablet Take night before surgery    tadalafil (CIALIS) 5 MG tablet Hold day of surgery.    Medication instructions for day surgery reviewed. Please use only a sip of water to take your instructed medications. Avoid all over the counter vitamins, supplements and NSAIDS for one week prior to surgery per anesthesia guidelines. Tylenol is ok to take as needed.     You will receive a call one business day prior to surgery with an arrival time and hospital directions. If your surgery is scheduled on a Monday, the hospital will be calling you on the Friday prior to your surgery. If you have not heard from anyone by 8pm, please call the hospital supervisor through the hospital  at [phone]. ([place] [phone] or [place] [phone]).    Do not eat or drink anything after midnight the night before your surgery, including candy, mints, lifesavers, or chewing gum. Do not drink alcohol 24hrs before your surgery. Try not to smoke at least 24hrs before your surgery.       Follow the pre surgery showering instructions as listed in the “My Surgical Experience Booklet” or otherwise provided by your surgeon's office. Do not use a blade to shave the surgical area 1 week before surgery. It is okay to use a clean electric clippers up to 24 hours before surgery. Do not apply any lotions, creams, including makeup, cologne, deodorant, or perfumes after showering on the day of your surgery. Do not use dry shampoo, hair spray, hair gel, or any type of hair products.     No contact lenses, eye make-up,  or artificial eyelashes. Remove nail polish, including gel polish, and any artificial, gel, or acrylic nails if possible. Remove all jewelry including rings and body piercing jewelry.     Wear causal clothing that is easy to take on and off. Consider your type of surgery.    Keep any valuables, jewelry, piercings at home. Please bring any specially ordered equipment (sling, braces) if indicated.    Arrange for a responsible person to drive you to and from the hospital on the day of your surgery. Please confirm the visitor policy for the day of your procedure when you receive your phone call with an arrival time.     Call the surgeon's office with any new illnesses, exposures, or additional questions prior to surgery.    Please reference your “My Surgical Experience Booklet” for additional information to prepare for your upcoming surgery.    Pt has surgical soap.

## 2024-04-23 ENCOUNTER — OFFICE VISIT (OUTPATIENT)
Dept: INTERNAL MEDICINE CLINIC | Facility: CLINIC | Age: 69
End: 2024-04-23
Payer: COMMERCIAL

## 2024-04-23 ENCOUNTER — TELEPHONE (OUTPATIENT)
Age: 69
End: 2024-04-23

## 2024-04-23 ENCOUNTER — TELEPHONE (OUTPATIENT)
Dept: INTERNAL MEDICINE CLINIC | Facility: CLINIC | Age: 69
End: 2024-04-23

## 2024-04-23 VITALS
DIASTOLIC BLOOD PRESSURE: 72 MMHG | TEMPERATURE: 98 F | HEIGHT: 68 IN | HEART RATE: 110 BPM | BODY MASS INDEX: 25.31 KG/M2 | WEIGHT: 167 LBS | SYSTOLIC BLOOD PRESSURE: 124 MMHG | OXYGEN SATURATION: 98 %

## 2024-04-23 DIAGNOSIS — K86.81 EXOCRINE PANCREATIC INSUFFICIENCY: ICD-10-CM

## 2024-04-23 DIAGNOSIS — E11.9 TYPE 2 DIABETES MELLITUS WITHOUT COMPLICATION, UNSPECIFIED WHETHER LONG TERM INSULIN USE (HCC): ICD-10-CM

## 2024-04-23 DIAGNOSIS — E78.2 MIXED HYPERLIPIDEMIA: ICD-10-CM

## 2024-04-23 DIAGNOSIS — E11.65 TYPE 2 DIABETES MELLITUS WITH HYPERGLYCEMIA, WITH LONG-TERM CURRENT USE OF INSULIN (HCC): ICD-10-CM

## 2024-04-23 DIAGNOSIS — Z01.818 PRE-OP EXAMINATION: Primary | ICD-10-CM

## 2024-04-23 DIAGNOSIS — K85.10 GALLSTONE PANCREATITIS: ICD-10-CM

## 2024-04-23 DIAGNOSIS — Z79.4 TYPE 2 DIABETES MELLITUS WITH HYPERGLYCEMIA, WITH LONG-TERM CURRENT USE OF INSULIN (HCC): ICD-10-CM

## 2024-04-23 LAB — SL AMB POCT HEMOGLOBIN AIC: 9.2 (ref ?–6.5)

## 2024-04-23 PROCEDURE — 3046F HEMOGLOBIN A1C LEVEL >9.0%: CPT | Performed by: FAMILY MEDICINE

## 2024-04-23 PROCEDURE — 99214 OFFICE O/P EST MOD 30 MIN: CPT | Performed by: FAMILY MEDICINE

## 2024-04-23 PROCEDURE — 83036 HEMOGLOBIN GLYCOSYLATED A1C: CPT | Performed by: FAMILY MEDICINE

## 2024-04-23 NOTE — TELEPHONE ENCOUNTER
Pt scheduled for cholecystectomy on 4/29 but most recent A1c was at 10. PAT was requesting to have pt seen for diabetic management and possible endo referral. Surgery date is looking to be pushed back to later in the year. PAT requesting call after pt is seen today with provider recommendations as far as surgery.      If call back is going to be made after today please call main PAT line at: [phone]

## 2024-04-23 NOTE — TELEPHONE ENCOUNTER
Pt scheduled for cholecystectomy on 4/29 but most recent A1c was at 10. PAT was requesting to have pt seen for diabetic management and possible endo referral. Surgery date is looking to be pushed back to later in the year. PAT requesting call after pt is seen today with provider recommendations as far as surgery.      If call back is going to be made after today please call main PAT line at: [phone]

## 2024-04-23 NOTE — PROGRESS NOTES
"Name: Toro Adames Jr.      : 1955      MRN: [id number]  Encounter Provider: Alissa Chowdhury MD  Encounter Date: 2024   Encounter department: [facility]    Assessment & Plan     Assessment & Plan       Orders Placed This Encounter   Procedures    POCT hemoglobin A1c       Subjective      History of Present Illness    Review of Systems   Constitutional:  Negative for activity change, appetite change, chills and fever.   HENT:  Negative for hearing loss.    Eyes:  Negative for pain and visual disturbance.   Respiratory:  Negative for chest tightness and shortness of breath.    Cardiovascular:  Negative for chest pain and palpitations.   Gastrointestinal:  Negative for abdominal pain, blood in stool, diarrhea, nausea and vomiting.   Endocrine: Negative for polydipsia, polyphagia and polyuria.   Genitourinary:  Negative for dysuria.   Musculoskeletal:  Negative for arthralgias and gait problem.   Skin:  Negative for color change.   Neurological:  Negative for dizziness and headaches.   Hematological:  Does not bruise/bleed easily.   Psychiatric/Behavioral:  Negative for behavioral problems. The patient is not nervous/anxious.          Objective     /72 (BP Location: Left arm, Patient Position: Sitting, Cuff Size: Standard)   Pulse (!) 110   Temp 98 °F (36.7 °C)   Ht 5' 8\" (1.727 m)   Wt 75.8 kg (167 lb)   SpO2 98%   BMI 25.39 kg/m²     Physical Exam    Physical Exam  Constitutional:       General: He is not in acute distress.  HENT:      Head: Normocephalic and atraumatic.      Nose: Nose normal.   Eyes:      Conjunctiva/sclera: Conjunctivae normal.      Pupils: Pupils are equal, round, and reactive to light.   Cardiovascular:      Rate and Rhythm: Normal rate and regular rhythm.      Heart sounds: Normal heart sounds. No murmur heard.     No friction rub. No gallop.   Pulmonary:      Breath sounds: No wheezing or rales.   Chest:      Chest wall: No " tenderness.   Abdominal:      General: There is no distension.      Tenderness: There is no abdominal tenderness. There is no guarding or rebound.   Lymphadenopathy:      Cervical: No cervical adenopathy.   Skin:     General: Skin is warm and dry.   Neurological:      Mental Status: He is alert and oriented to person, place, and time.   Psychiatric:         Behavior: Behavior normal.     Below is the patient's most recent value for Albumin, ALT, AST, BUN, Calcium, Chloride, Cholesterol, CO2, Creatinine, GFR, Glucose, HDL, Hematocrit, Hemoglobin, Hemoglobin A1C, LDL, Magnesium, Phosphorus, Platelets, Potassium, PSA, Sodium, Triglycerides, and WBC.   Lab Results   Component Value Date    ALT 12 04/18/2024    AST 15 04/18/2024    BUN 11 04/18/2024    CALCIUM 8.8 04/18/2024    CL 97 04/18/2024    CHOL 138 03/11/2015    CO2 28 04/18/2024    CREATININE 0.73 04/18/2024    HDL 54 03/07/2024    HCT 48.1 04/18/2024    HGB 16.9 04/18/2024    HGBA1C 9.2 (A) 04/23/2024    MG 1.8 (L) 01/02/2024    PHOS 2.7 06/16/2016     04/18/2024    K 5.1 04/18/2024    PSA 0.84 03/07/2024     08/23/2015    TRIG 157 (H) 03/07/2024    WBC 9.69 04/18/2024     Note: for a comprehensive list of the patient's lab results, access the Results Review activity.

## 2024-04-24 RX ORDER — INSULIN GLARGINE 100 [IU]/ML
20 INJECTION, SOLUTION SUBCUTANEOUS DAILY
Start: 2024-04-24

## 2024-04-24 NOTE — PROGRESS NOTES
Assessment/Plan:    No problem-specific Assessment & Plan notes found for this encounter.       Diagnoses and all orders for this visit:    Pre-op examination  -     POCT hemoglobin A1c    Type 2 diabetes mellitus without complication, unspecified whether long term insulin use (HCC)  -     POCT hemoglobin A1c    Type 2 diabetes mellitus with hyperglycemia, with long-term current use of insulin (HCC)    Gallstone pancreatitis    Exocrine pancreatic insufficiency    Mixed hyperlipidemia     Orders and recommendations as noted above.  Hemoglobin A1c has decreased somewhat despite it only being about 3 to 4 weeks since medication adjustments.  Discussed with him that his hemoglobin A1c still remains significantly above goal.  Discussed with him postponing the cholecystectomy at this point until his blood sugars are better controlled.  He understands the logic in this and agrees with the plan.  Discussed with him following up with endocrinology but he declines at present.  Discussed the need for better blood sugar control.  Discussed options for treatment.  Did not tolerate the Invokana or Farxiga.  Concerns regarding medication such as Ozempic especially with his history of the pancreatitis.  Continue with the Amaryl.  Continue with the metformin.  Will increase the dose of his Lantus.  Advised to increase the dose of the Basaglar to 20 units daily for the next 1 to 2 weeks and then contact the office with blood sugars.  Will gradually increase the dose of the Lantus by 5 to 10 units depending on his blood sugar readings.  Will have him get repeat laboratory testing in about 6 to 8 weeks.  Goal would be having his blood sugar significantly decreased and his hemoglobin A1c less than 8 prior to proceeding with surgery if possible.  Discussed watching for any recurrent symptoms of pancreatitis.  Continue with the pravastatin as previously.  Continue with lisinopril as previously.  Discussed dietary changes which have been  an issue for him in the past.  Will have him follow-up in about 8 weeks or sooner if needed.    Subjective:      Patient ID: Toro Adames Jr. is a 68 y.o. male.    He presents for follow-up.  Has generally been feeling well.  Has noticed an improvement in his energy level.  Tolerating the Amaryl, metformin, and Basaglar without difficulty.  Denies any hypoglycemic episodes.  Blood sugars have been significantly elevated at home, however.  Have been ranging in the low to mid 200s at times.  Rarely less than 200 when he checks this.  Admits that he does not check his blood sugars as regularly as he should.  Does not always follow his diet closely.  Does not drink beverages containing sugar but does admit to eating foods higher in carbohydrates.  He denies any abdominal pain or recurrent symptoms consistent with his previous pancreatitis.  He was planned to have surgery next week for cholecystectomy but understands that this may need to be delayed because of his ongoing elevated blood sugars.  Tolerating his pravastatin without difficulty.  Denies any muscle aches or weakness with this.  Tolerating his lisinopril well.  Denies any headaches or localized weakness.  Continues to work basically daily.        The following portions of the patient's history were reviewed and updated as appropriate: He  has a past medical history of Acute pancreatitis (01/01/2024), Arthritis, Diabetes mellitus (HCC), Hearing loss, Hyperlipidemia, Pancreatitis, Tobacco abuse (06/14/2016), and Wears partial dentures.  He   Patient Active Problem List    Diagnosis Date Noted    Diabetic neuropathy (HCC) 03/21/2024    Exocrine pancreatic insufficiency 03/21/2024    Gallstone pancreatitis 02/29/2024    Diarrhea 01/15/2024    Gallbladder sludge 01/15/2024    Umbilical hernia without obstruction and without gangrene 01/15/2024    Scarring of lung 01/01/2024    Erythrocytosis 10/04/2023    Allergic rhinitis 04/20/2020    Chronic sinusitis  12/10/2019    Leukocytosis 12/10/2019    Muscle weakness 04/02/2019    Erectile dysfunction 04/02/2019    Primary osteoarthritis involving multiple joints 04/02/2019    Vitamin D deficiency 04/01/2019    Hyponatremia 06/16/2016    Type 2 diabetes mellitus with hyperglycemia, with long-term current use of insulin (HCC) 06/14/2016    Hyperlipidemia 06/14/2016     He  has a past surgical history that includes Ankle surgery (Left); Incision and drainage of wound (Right, 06/15/2016); and Colonoscopy.  His family history includes Dementia in his father; Diabetes in his mother.  He  reports that he quit smoking about 4 years ago. His smoking use included cigarettes. He started smoking about 24 years ago. He has a 40 pack-year smoking history. He has never used smokeless tobacco. He reports that he does not drink alcohol and does not use drugs.  Current Outpatient Medications   Medication Sig Dispense Refill    ergocalciferol (VITAMIN D2) 50,000 units Take 1 capsule (50,000 Units total) by mouth every 14 (fourteen) days 6 capsule 3    glimepiride (AMARYL) 4 mg tablet Take 1 tablet (4 mg total) by mouth 2 (two) times a day 180 tablet 1    Insulin Glargine Solostar (Basaglar KwikPen) 100 UNIT/ML SOPN Inject 0.1 mL (10 Units total) under the skin in the morning 15 mL 1    Insulin Pen Needle (BD Pen Needle Tran 2nd Gen) 32G X 4 MM MISC Inject as directed 3 (three) times a day 100 each 3    Insulin Pen Needle 32G X 4 MM MISC Use 3 (three) times a day 100 each 3    lisinopril (ZESTRIL) 2.5 mg tablet TAKE 1 TABLET BY MOUTH EVERY DAY 90 tablet 1    metFORMIN (GLUCOPHAGE) 850 mg tablet Take 1 tablet (850 mg total) by mouth 2 (two) times a day with meals 270 tablet 1    pravastatin (PRAVACHOL) 20 mg tablet TAKE 1 TABLET BY MOUTH EVERY DAY 90 tablet 1    tadalafil (CIALIS) 5 MG tablet Take 1 tablet (5 mg total) by mouth daily as needed for erectile dysfunction 10 tablet 3     No current facility-administered medications for this  visit.     Current Outpatient Medications on File Prior to Visit   Medication Sig    ergocalciferol (VITAMIN D2) 50,000 units Take 1 capsule (50,000 Units total) by mouth every 14 (fourteen) days    glimepiride (AMARYL) 4 mg tablet Take 1 tablet (4 mg total) by mouth 2 (two) times a day    Insulin Glargine Solostar (Basaglar KwikPen) 100 UNIT/ML SOPN Inject 0.1 mL (10 Units total) under the skin in the morning    Insulin Pen Needle (BD Pen Needle Tran 2nd Gen) 32G X 4 MM MISC Inject as directed 3 (three) times a day    Insulin Pen Needle 32G X 4 MM MISC Use 3 (three) times a day    lisinopril (ZESTRIL) 2.5 mg tablet TAKE 1 TABLET BY MOUTH EVERY DAY    metFORMIN (GLUCOPHAGE) 850 mg tablet Take 1 tablet (850 mg total) by mouth 2 (two) times a day with meals    pravastatin (PRAVACHOL) 20 mg tablet TAKE 1 TABLET BY MOUTH EVERY DAY    tadalafil (CIALIS) 5 MG tablet Take 1 tablet (5 mg total) by mouth daily as needed for erectile dysfunction     No current facility-administered medications on file prior to visit.     He is allergic to penicillins..    Review of Systems   Constitutional:  Negative for activity change, appetite change, chills and fever.   HENT:  Negative for hearing loss.    Eyes:  Negative for pain and visual disturbance.   Respiratory:  Negative for chest tightness and shortness of breath.    Cardiovascular:  Negative for chest pain and palpitations.   Gastrointestinal:  Negative for abdominal pain, blood in stool, diarrhea, nausea and vomiting.   Endocrine: Negative for polydipsia, polyphagia and polyuria.   Genitourinary:  Negative for dysuria.   Musculoskeletal:  Negative for arthralgias and gait problem.   Skin:  Negative for color change.   Neurological:  Negative for dizziness and headaches.   Hematological:  Does not bruise/bleed easily.   Psychiatric/Behavioral:  Negative for behavioral problems. The patient is not nervous/anxious.          Objective:      /72 (BP Location: Left arm, Patient  "Position: Sitting, Cuff Size: Standard)   Pulse (!) 110   Temp 98 °F (36.7 °C)   Ht 5' 8\" (1.727 m)   Wt 75.8 kg (167 lb)   SpO2 98%   BMI 25.39 kg/m²          Physical Exam  Vitals and nursing note reviewed.   Constitutional:       General: He is not in acute distress.  HENT:      Head: Normocephalic and atraumatic.      Nose: Nose normal.   Eyes:      Conjunctiva/sclera: Conjunctivae normal.      Pupils: Pupils are equal, round, and reactive to light.   Cardiovascular:      Rate and Rhythm: Normal rate and regular rhythm.      Heart sounds: Normal heart sounds. No murmur heard.     No friction rub. No gallop.   Pulmonary:      Breath sounds: No wheezing or rales.   Chest:      Chest wall: No tenderness.   Abdominal:      General: There is no distension.      Tenderness: There is no abdominal tenderness. There is no guarding or rebound.   Lymphadenopathy:      Cervical: No cervical adenopathy.   Skin:     General: Skin is warm and dry.   Neurological:      Mental Status: He is alert and oriented to person, place, and time.   Psychiatric:         Mood and Affect: Mood and affect normal.         Speech: Speech normal.         Behavior: Behavior normal.         Cognition and Memory: Cognition and memory normal.       Below is the patient's most recent value for Albumin, ALT, AST, BUN, Calcium, Chloride, Cholesterol, CO2, Creatinine, GFR, Glucose, HDL, Hematocrit, Hemoglobin, Hemoglobin A1C, LDL, Magnesium, Phosphorus, Platelets, Potassium, PSA, Sodium, Triglycerides, and WBC.   Lab Results   Component Value Date    ALT 12 04/18/2024    AST 15 04/18/2024    BUN 11 04/18/2024    CALCIUM 8.8 04/18/2024    CL 97 04/18/2024    CHOL 138 03/11/2015    CO2 28 04/18/2024    CREATININE 0.73 04/18/2024    HDL 54 03/07/2024    HCT 48.1 04/18/2024    HGB 16.9 04/18/2024    HGBA1C 9.2 (A) 04/23/2024    MG 1.8 (L) 01/02/2024    PHOS 2.7 06/16/2016     04/18/2024    K 5.1 04/18/2024    PSA 0.84 03/07/2024     " 08/23/2015    TRIG 157 (H) 03/07/2024    WBC 9.69 04/18/2024     Note: for a comprehensive list of the patient's lab results, access the Results Review activity.

## 2024-05-16 ENCOUNTER — VBI (OUTPATIENT)
Dept: ADMINISTRATIVE | Facility: OTHER | Age: 69
End: 2024-05-16

## 2024-05-23 DIAGNOSIS — E11.65 TYPE 2 DIABETES MELLITUS WITH HYPERGLYCEMIA, UNSPECIFIED WHETHER LONG TERM INSULIN USE (HCC): ICD-10-CM

## 2024-05-23 RX ORDER — LISINOPRIL 2.5 MG/1
TABLET ORAL
Qty: 90 TABLET | Refills: 1 | Status: SHIPPED | OUTPATIENT
Start: 2024-05-23

## 2024-06-17 DIAGNOSIS — E11.65 TYPE 2 DIABETES MELLITUS WITH HYPERGLYCEMIA, WITH LONG-TERM CURRENT USE OF INSULIN (HCC): ICD-10-CM

## 2024-06-17 DIAGNOSIS — Z79.4 TYPE 2 DIABETES MELLITUS WITH HYPERGLYCEMIA, WITH LONG-TERM CURRENT USE OF INSULIN (HCC): ICD-10-CM

## 2024-06-17 RX ORDER — INSULIN GLARGINE 100 [IU]/ML
20 INJECTION, SOLUTION SUBCUTANEOUS DAILY
Qty: 15 ML | Refills: 1 | Status: SHIPPED | OUTPATIENT
Start: 2024-06-17

## 2024-06-17 NOTE — TELEPHONE ENCOUNTER
Patients spouse stated patient should be getting 20 unit in the morning.    Reason for call:   [x] Refill   [] Prior Auth  [] Other:     Office:   [x] PCP/Provider - Alissa Chowdhury MD  [] Specialty/Provider -     Medication: Insulin Glargine Solostar (Basaglar KwikPen) 100 UNIT/ML SOPN     Dose/Frequency: 20 Units, Subcutaneous, Daily     Quantity: 15 ml    Pharmacy: [facility]/pharmacy #[id number] - [place] - [phone]     Does the patient have enough for 3 days?   [x] Yes   [] No - Send as HP to POD

## 2024-07-22 ENCOUNTER — TELEPHONE (OUTPATIENT)
Dept: ADMINISTRATIVE | Facility: OTHER | Age: 69
End: 2024-07-22

## 2024-07-22 NOTE — TELEPHONE ENCOUNTER
07/22/24 10:39 AM    Patient contacted to bring Advance Directive, POLST, or Living Will document to next scheduled pcp visit.[facility] Department left message.    Thank you.  Yamile Xiong  [facility]

## 2024-07-23 ENCOUNTER — OFFICE VISIT (OUTPATIENT)
Dept: INTERNAL MEDICINE CLINIC | Facility: CLINIC | Age: 69
End: 2024-07-23
Payer: COMMERCIAL

## 2024-07-23 VITALS
OXYGEN SATURATION: 98 % | HEART RATE: 102 BPM | DIASTOLIC BLOOD PRESSURE: 76 MMHG | HEIGHT: 68 IN | WEIGHT: 167.9 LBS | BODY MASS INDEX: 25.45 KG/M2 | TEMPERATURE: 98 F | SYSTOLIC BLOOD PRESSURE: 132 MMHG

## 2024-07-23 DIAGNOSIS — E11.42 DIABETIC POLYNEUROPATHY ASSOCIATED WITH TYPE 2 DIABETES MELLITUS (HCC): ICD-10-CM

## 2024-07-23 DIAGNOSIS — E78.2 MIXED HYPERLIPIDEMIA: ICD-10-CM

## 2024-07-23 DIAGNOSIS — E55.9 VITAMIN D DEFICIENCY: ICD-10-CM

## 2024-07-23 DIAGNOSIS — E11.65 TYPE 2 DIABETES MELLITUS WITH HYPERGLYCEMIA, WITH LONG-TERM CURRENT USE OF INSULIN (HCC): Primary | ICD-10-CM

## 2024-07-23 DIAGNOSIS — Z79.4 TYPE 2 DIABETES MELLITUS WITH HYPERGLYCEMIA, WITH LONG-TERM CURRENT USE OF INSULIN (HCC): Primary | ICD-10-CM

## 2024-07-23 PROCEDURE — 99214 OFFICE O/P EST MOD 30 MIN: CPT | Performed by: FAMILY MEDICINE

## 2024-07-23 PROCEDURE — G2211 COMPLEX E/M VISIT ADD ON: HCPCS | Performed by: FAMILY MEDICINE

## 2024-07-23 RX ORDER — INSULIN GLARGINE 100 [IU]/ML
30 INJECTION, SOLUTION SUBCUTANEOUS DAILY
Qty: 15 ML | Refills: 1 | Status: SHIPPED | OUTPATIENT
Start: 2024-07-23

## 2024-07-23 NOTE — PROGRESS NOTES
Ambulatory Visit  Name: Toro Adames Jr.      : 1955      MRN: [id number]  Encounter Provider: Alissa Chowdhury MD  Encounter Date: 2024   Encounter department: [facility]    Assessment & Plan   1. Type 2 diabetes mellitus with hyperglycemia, with long-term current use of insulin (HCC)  -     Comprehensive metabolic panel; Future; Expected date: 2024  -     Hemoglobin A1C; Future; Expected date: 2024  -     CBC and differential; Future; Expected date: 2024  -     Insulin Glargine Solostar (Basaglar KwikPen) 100 UNIT/ML SOPN; Inject 0.3 mL (30 Units total) under the skin in the morning  2. Diabetic polyneuropathy associated with type 2 diabetes mellitus (HCC)  -     Comprehensive metabolic panel; Future; Expected date: 2024  -     Hemoglobin A1C; Future; Expected date: 2024  -     CBC and differential; Future; Expected date: 2024  3. Mixed hyperlipidemia  -     Comprehensive metabolic panel; Future; Expected date: 2024  -     Lipid Panel with Direct LDL reflex; Future; Expected date: 2024  -     TSH, 3rd generation with Free T4 reflex; Future; Expected date: 2024  -     CBC and differential; Future; Expected date: 2024  4. Vitamin D deficiency  -     Comprehensive metabolic panel; Future; Expected date: 2024  -     CBC and differential; Future; Expected date: 2024  -     Vitamin D 25 hydroxy; Future    Orders and recommendations as noted above.  Discussed his elevated blood sugars with him.  Discussed checking his blood sugars on a daily basis.  Discussed goal of hemoglobin A1c initially less than 8 but true goal less than 7.  Will gradually increase the dose of his insulin.  Follow-up with ophthalmology regularly.  Vision changes discussed with him.  Check feet daily.  Remain as active as possible.  Watch for any hypoglycemic episodes.  Continue with the lisinopril.  Continue with the pravastatin.   Will have him follow-up in about 3 to 4 months with laboratory testing prior to that visit.  Follow-up sooner if needed.    Depression Screening and Follow-up Plan: Patient was screened for depression during today's encounter. They screened negative with a PHQ-2 score of 0.      History of Present Illness     He presents for routine follow-up.  Has generally been doing relatively well.  Blood sugars remain significantly elevated usually well over 200.  States that he usually feels fine with this.  Does have some vision changes but feels this is related to his cataracts.  Will be following up with ophthalmology in the near future.  Takes his metformin and his Amaryl on a daily basis.  Continues to take his insulin.  Denies any hypoglycemic episodes.  Tolerating his pravastatin without difficulty.  Denies any significant muscle aches or weakness.  Tolerating his lisinopril well.  Denies any headaches or localized weakness.  Appetite remains good.  Continues to work full-time.        Review of Systems   Constitutional:  Negative for activity change, appetite change, chills and fever.   HENT:  Negative for hearing loss.    Eyes:  Positive for visual disturbance. Negative for pain.   Respiratory:  Negative for chest tightness and shortness of breath.    Cardiovascular:  Negative for chest pain and palpitations.   Gastrointestinal:  Negative for abdominal distention, abdominal pain, blood in stool, diarrhea, nausea and vomiting.   Endocrine: Negative for polydipsia, polyphagia and polyuria.   Genitourinary:  Negative for dysuria.   Musculoskeletal:  Positive for arthralgias. Negative for gait problem.   Skin:  Negative for color change.   Neurological:  Negative for dizziness and headaches.   Hematological:  Does not bruise/bleed easily.   Psychiatric/Behavioral:  Negative for behavioral problems. The patient is not nervous/anxious.      Medical History Reviewed by provider this encounter:  Tobacco  Allergies  Meds   Problems  Med Hx  Surg Hx  Fam Hx       Past Medical History   Past Medical History:   Diagnosis Date    Acute pancreatitis 01/01/2024    Arthritis     Diabetes mellitus (HCC)     Hearing loss     Hyperlipidemia     Pancreatitis     Tobacco abuse 06/14/2016    Wears partial dentures     full upper, lower partial     Past Surgical History:   Procedure Laterality Date    ANKLE SURGERY Left     with hardware    COLONOSCOPY      INCISION AND DRAINAGE OF WOUND Right 06/15/2016    Procedure: INCISION AND DRAINAGE (I&D) EXTREMITY;  Surgeon: Andrew Israel MD;  Location: [facility];  Service:      Family History   Problem Relation Age of Onset    Diabetes Mother     Dementia Father      Current Outpatient Medications on File Prior to Visit   Medication Sig Dispense Refill    ergocalciferol (VITAMIN D2) 50,000 units Take 1 capsule (50,000 Units total) by mouth every 14 (fourteen) days 6 capsule 3    glimepiride (AMARYL) 4 mg tablet Take 1 tablet (4 mg total) by mouth 2 (two) times a day 180 tablet 1    Insulin Pen Needle (BD Pen Needle Tran 2nd Gen) 32G X 4 MM MISC Inject as directed 3 (three) times a day 100 each 3    Insulin Pen Needle 32G X 4 MM MISC Use 3 (three) times a day 100 each 3    lisinopril (ZESTRIL) 2.5 mg tablet TAKE 1 TABLET BY MOUTH EVERY DAY 90 tablet 1    metFORMIN (GLUCOPHAGE) 850 mg tablet Take 1 tablet (850 mg total) by mouth 2 (two) times a day with meals 270 tablet 1    pravastatin (PRAVACHOL) 20 mg tablet TAKE 1 TABLET BY MOUTH EVERY DAY 90 tablet 1    tadalafil (CIALIS) 5 MG tablet Take 1 tablet (5 mg total) by mouth daily as needed for erectile dysfunction 10 tablet 3    [DISCONTINUED] Insulin Glargine Solostar (Basaglar KwikPen) 100 UNIT/ML SOPN Inject 0.2 mL (20 Units total) under the skin in the morning 15 mL 1     No current facility-administered medications on file prior to visit.     Allergies   Allergen Reactions    Penicillins Other (See Comments)     Unsure of reaction      Current  "Outpatient Medications on File Prior to Visit   Medication Sig Dispense Refill    ergocalciferol (VITAMIN D2) 50,000 units Take 1 capsule (50,000 Units total) by mouth every 14 (fourteen) days 6 capsule 3    glimepiride (AMARYL) 4 mg tablet Take 1 tablet (4 mg total) by mouth 2 (two) times a day 180 tablet 1    Insulin Pen Needle (BD Pen Needle Tran 2nd Gen) 32G X 4 MM MISC Inject as directed 3 (three) times a day 100 each 3    Insulin Pen Needle 32G X 4 MM MISC Use 3 (three) times a day 100 each 3    lisinopril (ZESTRIL) 2.5 mg tablet TAKE 1 TABLET BY MOUTH EVERY DAY 90 tablet 1    metFORMIN (GLUCOPHAGE) 850 mg tablet Take 1 tablet (850 mg total) by mouth 2 (two) times a day with meals 270 tablet 1    pravastatin (PRAVACHOL) 20 mg tablet TAKE 1 TABLET BY MOUTH EVERY DAY 90 tablet 1    tadalafil (CIALIS) 5 MG tablet Take 1 tablet (5 mg total) by mouth daily as needed for erectile dysfunction 10 tablet 3    [DISCONTINUED] Insulin Glargine Solostar (Basaglar KwikPen) 100 UNIT/ML SOPN Inject 0.2 mL (20 Units total) under the skin in the morning 15 mL 1     No current facility-administered medications on file prior to visit.      Social History     Tobacco Use    Smoking status: Former     Current packs/day: 0.00     Average packs/day: 2.0 packs/day for 20.0 years (40.0 ttl pk-yrs)     Types: Cigarettes     Start date:      Quit date: 2020     Years since quittin.5    Smokeless tobacco: Never   Vaping Use    Vaping status: Never Used   Substance and Sexual Activity    Alcohol use: No    Drug use: No    Sexual activity: Yes     Partners: Female     Objective     /76 (BP Location: Left arm, Patient Position: Sitting, Cuff Size: Large)   Pulse 102   Temp 98 °F (36.7 °C)   Ht 5' 8\" (1.727 m)   Wt 76.2 kg (167 lb 14.4 oz)   SpO2 98%   BMI 25.53 kg/m²     Physical Exam  Vitals and nursing note reviewed.   Constitutional:       General: He is not in acute distress.     Appearance: He is well-developed, " well-groomed and normal weight.   HENT:      Head: Normocephalic and atraumatic.      Nose: Nose normal.   Eyes:      Conjunctiva/sclera: Conjunctivae normal.      Pupils: Pupils are equal, round, and reactive to light.      Comments: Cataracts bilaterally   Cardiovascular:      Rate and Rhythm: Normal rate and regular rhythm.      Heart sounds: Normal heart sounds. No murmur heard.     No friction rub. No gallop.   Pulmonary:      Breath sounds: No wheezing or rales.   Chest:      Chest wall: No tenderness.   Abdominal:      General: There is no distension.      Tenderness: There is no abdominal tenderness. There is no guarding or rebound.   Lymphadenopathy:      Cervical: No cervical adenopathy.   Skin:     General: Skin is warm and dry.   Neurological:      Mental Status: He is alert and oriented to person, place, and time.   Psychiatric:         Mood and Affect: Mood and affect normal.         Speech: Speech normal.         Behavior: Behavior normal. Behavior is cooperative.         Cognition and Memory: Cognition and memory normal.       Administrative Statements

## 2024-08-04 DIAGNOSIS — E78.2 MIXED HYPERLIPIDEMIA: ICD-10-CM

## 2024-08-04 RX ORDER — PRAVASTATIN SODIUM 20 MG
TABLET ORAL
Qty: 100 TABLET | Refills: 1 | Status: SHIPPED | OUTPATIENT
Start: 2024-08-04

## 2024-08-16 LAB
LEFT EYE DIABETIC RETINOPATHY: POSITIVE
RIGHT EYE DIABETIC RETINOPATHY: POSITIVE

## 2024-08-30 LAB
LEFT EYE DIABETIC RETINOPATHY: POSITIVE
RIGHT EYE DIABETIC RETINOPATHY: POSITIVE

## 2024-08-30 PROCEDURE — 2022F DILAT RTA XM EVC RTNOPTHY: CPT | Performed by: FAMILY MEDICINE

## 2024-09-10 DIAGNOSIS — Z79.4 TYPE 2 DIABETES MELLITUS WITH HYPERGLYCEMIA, WITH LONG-TERM CURRENT USE OF INSULIN (HCC): ICD-10-CM

## 2024-09-10 DIAGNOSIS — E11.65 TYPE 2 DIABETES MELLITUS WITH HYPERGLYCEMIA, WITH LONG-TERM CURRENT USE OF INSULIN (HCC): ICD-10-CM

## 2024-09-10 RX ORDER — GLIMEPIRIDE 4 MG/1
4 TABLET ORAL 2 TIMES DAILY
Qty: 180 TABLET | Refills: 1 | Status: SHIPPED | OUTPATIENT
Start: 2024-09-10

## 2024-10-17 ENCOUNTER — VBI (OUTPATIENT)
Dept: ADMINISTRATIVE | Facility: OTHER | Age: 69
End: 2024-10-17

## 2024-10-17 NOTE — TELEPHONE ENCOUNTER
10/17/24 11:26 AM     Chart reviewed for Diabetic Eye Exam ; nothing is submitted to the patient's insurance at this time.     Mil Salgado MA   [facility]

## 2024-11-10 ENCOUNTER — VBI (OUTPATIENT)
Dept: ADMINISTRATIVE | Facility: OTHER | Age: 69
End: 2024-11-10

## 2024-11-10 NOTE — TELEPHONE ENCOUNTER
11/10/24 11:15 AM     Chart reviewed for Hemoglobin A1c ; nothing is submitted to the patient's insurance at this time.     Mil Salgado MA   [facility]

## 2024-11-19 DIAGNOSIS — E11.65 TYPE 2 DIABETES MELLITUS WITH HYPERGLYCEMIA, UNSPECIFIED WHETHER LONG TERM INSULIN USE (HCC): ICD-10-CM

## 2024-11-20 RX ORDER — LISINOPRIL 2.5 MG/1
2.5 TABLET ORAL DAILY
Qty: 90 TABLET | Refills: 1 | Status: SHIPPED | OUTPATIENT
Start: 2024-11-20

## 2024-12-09 DIAGNOSIS — E11.65 TYPE 2 DIABETES MELLITUS WITH HYPERGLYCEMIA, WITH LONG-TERM CURRENT USE OF INSULIN (HCC): ICD-10-CM

## 2024-12-09 DIAGNOSIS — Z79.4 TYPE 2 DIABETES MELLITUS WITH HYPERGLYCEMIA, WITH LONG-TERM CURRENT USE OF INSULIN (HCC): ICD-10-CM

## 2024-12-09 RX ORDER — INSULIN GLARGINE 100 [IU]/ML
30 INJECTION, SOLUTION SUBCUTANEOUS DAILY
Qty: 10 ML | Refills: 0 | Status: SHIPPED | OUTPATIENT
Start: 2024-12-09

## 2024-12-09 RX ORDER — PEN NEEDLE, DIABETIC 32GX 5/32"
NEEDLE, DISPOSABLE MISCELLANEOUS 3 TIMES DAILY
Qty: 100 EACH | Refills: 0 | Status: SHIPPED | OUTPATIENT
Start: 2024-12-09

## 2024-12-09 NOTE — TELEPHONE ENCOUNTER
Reason for call:   [x] Refill   [] Prior Auth  [] Other:     Office:   [x] PCP/Provider -   [] Specialty/Provider -     Medication:     Insulin Glargine Solostar (Basaglar KwikPen) 100 UNIT/ML SOPN 30 Units, Subcutaneous, Daily             Summary: Inject 0.3 mL (30 Units total) under the skin in the morning,            Insulin Pen Needle (BD Pen Needle Tran 2nd Gen) 32G X 4 MM MISC Injection, 3 times daily             Summary: Inject as directed 3 (three) times a day,            Pharmacy: [facility] #[id number] - [place] - [id number]  P: [phone]    Does the patient have enough for 3 days?   [] Yes   [x] No - Send as HP to POD

## 2025-01-13 DIAGNOSIS — E11.65 TYPE 2 DIABETES MELLITUS WITH HYPERGLYCEMIA, WITH LONG-TERM CURRENT USE OF INSULIN (HCC): ICD-10-CM

## 2025-01-13 DIAGNOSIS — Z79.4 TYPE 2 DIABETES MELLITUS WITH HYPERGLYCEMIA, WITH LONG-TERM CURRENT USE OF INSULIN (HCC): ICD-10-CM

## 2025-01-13 NOTE — TELEPHONE ENCOUNTER
Medication: Basaglar KwikPen 100 unit/ml SOPN    Dose/Frequency: 0.3ml     Quantity: 10 ml    Pharmacy: [facility] #[id number]    Office:   [x] PCP/Provider -  Alissa Chowdhury MD   [] Speciality/Provider -     Does the patient have enough for 3 days?   [x] Yes   [] No - Send as HP to POD

## 2025-01-14 DIAGNOSIS — Z79.4 TYPE 2 DIABETES MELLITUS WITH HYPERGLYCEMIA, WITH LONG-TERM CURRENT USE OF INSULIN (HCC): ICD-10-CM

## 2025-01-14 DIAGNOSIS — E11.65 TYPE 2 DIABETES MELLITUS WITH HYPERGLYCEMIA, WITH LONG-TERM CURRENT USE OF INSULIN (HCC): ICD-10-CM

## 2025-01-15 RX ORDER — INSULIN GLARGINE 100 [IU]/ML
INJECTION, SOLUTION SUBCUTANEOUS
OUTPATIENT
Start: 2025-01-15

## 2025-01-15 RX ORDER — INSULIN GLARGINE 100 [IU]/ML
30 INJECTION, SOLUTION SUBCUTANEOUS DAILY
Qty: 10 ML | Refills: 0 | Status: SHIPPED | OUTPATIENT
Start: 2025-01-15 | End: 2025-01-20 | Stop reason: SDUPTHER

## 2025-01-20 DIAGNOSIS — E11.65 TYPE 2 DIABETES MELLITUS WITH HYPERGLYCEMIA, WITH LONG-TERM CURRENT USE OF INSULIN (HCC): ICD-10-CM

## 2025-01-20 DIAGNOSIS — Z79.4 TYPE 2 DIABETES MELLITUS WITH HYPERGLYCEMIA, WITH LONG-TERM CURRENT USE OF INSULIN (HCC): ICD-10-CM

## 2025-01-20 RX ORDER — INSULIN GLARGINE 100 [IU]/ML
30 INJECTION, SOLUTION SUBCUTANEOUS DAILY
Qty: 10 ML | Refills: 5 | Status: SHIPPED | OUTPATIENT
Start: 2025-01-20

## 2025-01-29 ENCOUNTER — VBI (OUTPATIENT)
Dept: ADMINISTRATIVE | Facility: OTHER | Age: 70
End: 2025-01-29

## 2025-01-29 NOTE — TELEPHONE ENCOUNTER
01/29/25 2:53 PM     Chart reviewed for Hemoglobin A1c ; nothing is submitted to the patient's insurance at this time.     Mil Salgado MA   [facility]

## 2025-02-11 ENCOUNTER — RA CDI HCC (OUTPATIENT)
Dept: OTHER | Facility: HOSPITAL | Age: 70
End: 2025-02-11

## 2025-02-11 NOTE — PROGRESS NOTES
HCC coding opportunities          Chart Reviewed number of suggestions sent to Provider: 1   E11.65    Patients Insurance     Medicare Insurance: [employer]

## 2025-02-14 ENCOUNTER — TELEPHONE (OUTPATIENT)
Dept: ADMINISTRATIVE | Facility: OTHER | Age: 70
End: 2025-02-14

## 2025-02-14 NOTE — TELEPHONE ENCOUNTER
02/14/25 3:48 PM    Patient contacted to bring Advance Directive, POLST, or Living Will document to next scheduled pcp visit.[facility] Department spoke with patient/ caregiver. Suggested asking for the 5 wishes since no copy of Living Will to bring in    Thank you.  Sumi Mcgregor MA  [facility]

## 2025-02-16 DIAGNOSIS — E78.2 MIXED HYPERLIPIDEMIA: ICD-10-CM

## 2025-02-17 ENCOUNTER — OFFICE VISIT (OUTPATIENT)
Dept: INTERNAL MEDICINE CLINIC | Facility: CLINIC | Age: 70
End: 2025-02-17
Payer: COMMERCIAL

## 2025-02-17 VITALS
HEIGHT: 68 IN | WEIGHT: 177.1 LBS | DIASTOLIC BLOOD PRESSURE: 86 MMHG | HEART RATE: 108 BPM | OXYGEN SATURATION: 98 % | BODY MASS INDEX: 26.84 KG/M2 | SYSTOLIC BLOOD PRESSURE: 146 MMHG

## 2025-02-17 DIAGNOSIS — K86.81 EXOCRINE PANCREATIC INSUFFICIENCY: ICD-10-CM

## 2025-02-17 DIAGNOSIS — K82.8 GALLBLADDER SLUDGE: ICD-10-CM

## 2025-02-17 DIAGNOSIS — E11.65 TYPE 2 DIABETES MELLITUS WITH HYPERGLYCEMIA, WITH LONG-TERM CURRENT USE OF INSULIN (HCC): Primary | ICD-10-CM

## 2025-02-17 DIAGNOSIS — R19.7 DIARRHEA, UNSPECIFIED TYPE: ICD-10-CM

## 2025-02-17 DIAGNOSIS — N52.9 ERECTILE DYSFUNCTION, UNSPECIFIED ERECTILE DYSFUNCTION TYPE: ICD-10-CM

## 2025-02-17 DIAGNOSIS — E11.42 DIABETIC POLYNEUROPATHY ASSOCIATED WITH TYPE 2 DIABETES MELLITUS (HCC): ICD-10-CM

## 2025-02-17 DIAGNOSIS — E55.9 VITAMIN D DEFICIENCY: ICD-10-CM

## 2025-02-17 DIAGNOSIS — Z79.4 TYPE 2 DIABETES MELLITUS WITH HYPERGLYCEMIA, WITH LONG-TERM CURRENT USE OF INSULIN (HCC): Primary | ICD-10-CM

## 2025-02-17 DIAGNOSIS — E78.2 MIXED HYPERLIPIDEMIA: ICD-10-CM

## 2025-02-17 DIAGNOSIS — E13.69 OTHER SPECIFIED DIABETES MELLITUS WITH OTHER SPECIFIED COMPLICATION, UNSPECIFIED WHETHER LONG TERM INSULIN USE (HCC): ICD-10-CM

## 2025-02-17 DIAGNOSIS — E11.65 TYPE 2 DIABETES MELLITUS WITH HYPERGLYCEMIA, UNSPECIFIED WHETHER LONG TERM INSULIN USE (HCC): ICD-10-CM

## 2025-02-17 DIAGNOSIS — R49.0 CHRONIC HOARSENESS: ICD-10-CM

## 2025-02-17 PROCEDURE — G2211 COMPLEX E/M VISIT ADD ON: HCPCS | Performed by: FAMILY MEDICINE

## 2025-02-17 PROCEDURE — 99214 OFFICE O/P EST MOD 30 MIN: CPT | Performed by: FAMILY MEDICINE

## 2025-02-17 RX ORDER — PRAVASTATIN SODIUM 20 MG
20 TABLET ORAL DAILY
Qty: 100 TABLET | Refills: 1 | Status: SHIPPED | OUTPATIENT
Start: 2025-02-17 | End: 2025-02-18 | Stop reason: SDUPTHER

## 2025-02-17 NOTE — PROGRESS NOTES
Name: Toro Adames Jr.      : 1955      MRN: [id number]  Encounter Provider: Alissa Chowdhury MD  Encounter Date: 2025   Encounter department: [facility]  :  Assessment & Plan  Type 2 diabetes mellitus with hyperglycemia, with long-term current use of insulin (HCC)  He is overdue for his laboratory testing.  He will have this done in the near future.  Blood sugars have improved at home but still above goal.  Currently on the Basaglar 40 units daily along with the Amaryl and the metformin.  Continue with the lower dose of the metformin since the diarrhea has lessened.  Need to be careful with other medications since he does have a history of significant pancreatitis.  Watch diet more closely.  Watch sweets.  Recent issues with his eyes discussed.  Continue to follow closely with ophthalmology.  Check feet daily.  Lab Results   Component Value Date    HGBA1C 9.2 (A) 2024     Orders:  •  CBC and differential; Future  •  Comprehensive metabolic panel; Future  •  Hemoglobin A1C; Future  •  Albumin / creatinine urine ratio  •  glimepiride (AMARYL) 4 mg tablet; Take 1 tablet (4 mg total) by mouth 2 (two) times a day  •  Insulin Glargine Solostar (Basaglar KwikPen) 100 UNIT/ML SOPN; Inject 0.4 mL (40 Units total) under the skin in the morning    Mixed hyperlipidemia  Discussed with him getting laboratory testing.  Slip given again for this.  Continue with the pravastatin.  Watch diet.  Increase fiber in diet.  Orders:  •  CBC and differential; Future  •  Comprehensive metabolic panel; Future  •  Lipid panel; Future  •  TSH, 3rd generation; Future  •  pravastatin (PRAVACHOL) 20 mg tablet; Take 1 tablet (20 mg total) by mouth daily    Diabetic polyneuropathy associated with type 2 diabetes mellitus (HCC)  Neuropathy symptoms discussed.  Has a somewhat in his hands but also in his feet.  Better blood sugar control stressed.  Check feet daily.  Lab Results   Component Value  Date    HGBA1C 9.2 (A) 04/23/2024     Orders:  •  CBC and differential; Future    Vitamin D deficiency  Continue with vitamin D supplementation.  Refill given on all of his medications.  Orders:  •  CBC and differential; Future  •  Vitamin D 25 hydroxy; Future  •  ergocalciferol (VITAMIN D2) 50,000 units; Take 1 capsule (50,000 Units total) by mouth every 14 (fourteen) days    Diarrhea, unspecified type  Plan the symptoms have lessened.  Likely improved because of lower dosing of the metformin.  Watch for any worsening especially with his known history of the pancreatic insufficiency as well as the gallbladder issues.  Orders:  •  CBC and differential; Future    Gallbladder sludge  His symptoms have improved and he is basically asymptomatic at this point.  He wishes to hold off on any surgical intervention.  Advised him to watch for any worsening so this can be taking care of electively rather than emergently.  Orders:  •  CBC and differential; Future    Exocrine pancreatic insufficiency  Watch diet.  Watch fatty foods.  If diarrhea would worsen, would consider the addition of something like Creon or referral back to GI.  Orders:  •  CBC and differential; Future    Chronic hoarseness  Hoarseness discussed.  This is concerning especially with his history of smoking.  Will refer him to ENT for further investigation.  He declines lung screening program.  May consider in the future.  Orders:  •  CBC and differential; Future  •  Ambulatory Referral to Otolaryngology; Future    Other specified diabetes mellitus with other specified complication, unspecified whether long term insulin use (HCC)    Lab Results   Component Value Date    HGBA1C 9.2 (A) 04/23/2024     Orders:  •  Insulin Pen Needle 32G X 4 MM MISC; Use 3 (three) times a day    Type 2 diabetes mellitus with hyperglycemia, unspecified whether long term insulin use (HCC)    Lab Results   Component Value Date    HGBA1C 9.2 (A) 04/23/2024     Orders:  •  lisinopril  (ZESTRIL) 2.5 mg tablet; Take 1 tablet (2.5 mg total) by mouth daily  •  metFORMIN (GLUCOPHAGE) 850 mg tablet; Take 1 tablet (850 mg total) by mouth 2 (two) times a day with meals    Erectile dysfunction, unspecified erectile dysfunction type                History of Present Illness   He presents for routine follow-up.  Has generally been doing relatively well.  He has noticed some persistent hoarseness.  May have had a slight illness a few months back but nothing significant.  Has had continued hoarseness and denies any current respiratory symptoms.  He is concerned about this.  Denies any significant cough or shortness of breath.  Declines lung screening program at present but may consider in the future.  Blood sugars have been ranging in the 150s to 170s.  He has increased the Basaglar as previously discussed to 40 units daily.  He does occasionally get blood sugars in the morning that are around 100.  He was concerned about going higher on the Basaglar because of concerns about low blood sugars.  Tolerating low-dose the metformin better.  Less diarrhea.  He admits that he has been eating more sweets including candy especially around the holidays.  Did not have his laboratory testing completed prior to the visit.  He did follow-up with ophthalmology and was referred to [facility] eye for further investigation.  Was found to have some retinopathy but this had improved at later visits and did not require injections.  Continues with the lisinopril without difficulty.  Denies any headaches or localized weakness.  Tolerating the pravastatin well.  Denies any significant muscle aches or weakness.      Review of Systems   Constitutional:  Negative for activity change, appetite change, chills and fever.   HENT:  Positive for voice change. Negative for congestion and hearing loss.    Eyes:  Positive for visual disturbance. Negative for pain.   Respiratory:  Negative for cough, chest tightness and shortness of breath.   "  Cardiovascular:  Negative for chest pain and palpitations.   Gastrointestinal:  Positive for diarrhea. Negative for abdominal pain, blood in stool, nausea and vomiting.   Endocrine: Negative for polydipsia, polyphagia and polyuria.        As per HPI   Genitourinary:  Negative for dysuria.   Musculoskeletal:  Negative for arthralgias and gait problem.   Skin:  Negative for color change.   Neurological:  Negative for dizziness and headaches.   Hematological:  Does not bruise/bleed easily.   Psychiatric/Behavioral:  Negative for behavioral problems. The patient is not nervous/anxious.        Objective   /86 (BP Location: Left arm, Patient Position: Sitting, Cuff Size: Standard)   Pulse (!) 108   Ht 5' 8\" (1.727 m)   Wt 80.3 kg (177 lb 1.6 oz)   SpO2 98%   BMI 26.93 kg/m²      Physical Exam  Vitals and nursing note reviewed.   Constitutional:       General: He is not in acute distress.     Appearance: He is well-developed and well-groomed.   HENT:      Head: Normocephalic and atraumatic.      Comments: Somewhat hoarse voice; some posterior pharyngeal erythema noted     Nose: Nose normal.   Eyes:      Conjunctiva/sclera: Conjunctivae normal.      Pupils: Pupils are equal, round, and reactive to light.   Cardiovascular:      Rate and Rhythm: Normal rate and regular rhythm.      Heart sounds: Normal heart sounds. No murmur heard.     No friction rub. No gallop.   Pulmonary:      Breath sounds: Decreased breath sounds present. No wheezing or rales.   Chest:      Chest wall: No tenderness.   Abdominal:      General: There is no distension.      Tenderness: There is no abdominal tenderness. There is no guarding or rebound.   Lymphadenopathy:      Cervical: No cervical adenopathy.   Skin:     General: Skin is warm and dry.   Neurological:      Mental Status: He is alert and oriented to person, place, and time.   Psychiatric:         Mood and Affect: Mood and affect normal.         Speech: Speech normal.         " Behavior: Behavior normal. Behavior is cooperative.         Cognition and Memory: Cognition and memory normal.

## 2025-02-17 NOTE — ASSESSMENT & PLAN NOTE
He is overdue for his laboratory testing.  He will have this done in the near future.  Blood sugars have improved at home but still above goal.  Currently on the Basaglar 40 units daily along with the Amaryl and the metformin.  Continue with the lower dose of the metformin since the diarrhea has lessened.  Need to be careful with other medications since he does have a history of significant pancreatitis.  Watch diet more closely.  Watch sweets.  Recent issues with his eyes discussed.  Continue to follow closely with ophthalmology.  Check feet daily.  Lab Results   Component Value Date    HGBA1C 9.2 (A) 04/23/2024     Orders:  •  CBC and differential; Future  •  Comprehensive metabolic panel; Future  •  Hemoglobin A1C; Future  •  Albumin / creatinine urine ratio  •  glimepiride (AMARYL) 4 mg tablet; Take 1 tablet (4 mg total) by mouth 2 (two) times a day  •  Insulin Glargine Solostar (Basaglar KwikPen) 100 UNIT/ML SOPN; Inject 0.4 mL (40 Units total) under the skin in the morning

## 2025-02-17 NOTE — ASSESSMENT & PLAN NOTE
His symptoms have improved and he is basically asymptomatic at this point.  He wishes to hold off on any surgical intervention.  Advised him to watch for any worsening so this can be taking care of electively rather than emergently.  Orders:  •  CBC and differential; Future

## 2025-02-17 NOTE — ASSESSMENT & PLAN NOTE
Neuropathy symptoms discussed.  Has a somewhat in his hands but also in his feet.  Better blood sugar control stressed.  Check feet daily.  Lab Results   Component Value Date    HGBA1C 9.2 (A) 04/23/2024     Orders:  •  CBC and differential; Future

## 2025-02-17 NOTE — ASSESSMENT & PLAN NOTE
Continue with vitamin D supplementation.  Refill given on all of his medications.  Orders:  •  CBC and differential; Future  •  Vitamin D 25 hydroxy; Future  •  ergocalciferol (VITAMIN D2) 50,000 units; Take 1 capsule (50,000 Units total) by mouth every 14 (fourteen) days

## 2025-02-17 NOTE — ASSESSMENT & PLAN NOTE
Plan the symptoms have lessened.  Likely improved because of lower dosing of the metformin.  Watch for any worsening especially with his known history of the pancreatic insufficiency as well as the gallbladder issues.  Orders:  •  CBC and differential; Future

## 2025-02-17 NOTE — ASSESSMENT & PLAN NOTE
Watch diet.  Watch fatty foods.  If diarrhea would worsen, would consider the addition of something like Creon or referral back to GI.  Orders:  •  CBC and differential; Future

## 2025-02-17 NOTE — ASSESSMENT & PLAN NOTE
Hoarseness discussed.  This is concerning especially with his history of smoking.  Will refer him to ENT for further investigation.  He declines lung screening program.  May consider in the future.  Orders:  •  CBC and differential; Future  •  Ambulatory Referral to Otolaryngology; Future

## 2025-02-17 NOTE — ASSESSMENT & PLAN NOTE
Discussed with him getting laboratory testing.  Slip given again for this.  Continue with the pravastatin.  Watch diet.  Increase fiber in diet.  Orders:  •  CBC and differential; Future  •  Comprehensive metabolic panel; Future  •  Lipid panel; Future  •  TSH, 3rd generation; Future  •  pravastatin (PRAVACHOL) 20 mg tablet; Take 1 tablet (20 mg total) by mouth daily

## 2025-02-18 RX ORDER — PRAVASTATIN SODIUM 20 MG
20 TABLET ORAL DAILY
Qty: 100 TABLET | Refills: 1 | Status: SHIPPED | OUTPATIENT
Start: 2025-02-18

## 2025-02-18 RX ORDER — GLIMEPIRIDE 4 MG/1
4 TABLET ORAL 2 TIMES DAILY
Qty: 200 TABLET | Refills: 1 | Status: SHIPPED | OUTPATIENT
Start: 2025-02-18

## 2025-02-18 RX ORDER — LISINOPRIL 2.5 MG/1
2.5 TABLET ORAL DAILY
Qty: 100 TABLET | Refills: 1 | Status: SHIPPED | OUTPATIENT
Start: 2025-02-18

## 2025-02-18 RX ORDER — ERGOCALCIFEROL 1.25 MG/1
50000 CAPSULE, LIQUID FILLED ORAL
Qty: 6 CAPSULE | Refills: 3 | Status: SHIPPED | OUTPATIENT
Start: 2025-02-18

## 2025-02-18 RX ORDER — INSULIN GLARGINE 100 [IU]/ML
40 INJECTION, SOLUTION SUBCUTANEOUS DAILY
Qty: 45 ML | Refills: 1 | Status: SHIPPED | OUTPATIENT
Start: 2025-02-18

## 2025-02-28 ENCOUNTER — VBI (OUTPATIENT)
Dept: ADMINISTRATIVE | Facility: OTHER | Age: 70
End: 2025-02-28

## 2025-02-28 NOTE — TELEPHONE ENCOUNTER
02/28/25 1:07 PM     Chart reviewed for Hemoglobin A1c was/were not submitted to the patient's insurance.     Yamile Xiong MA   [facility]

## 2025-03-19 ENCOUNTER — VBI (OUTPATIENT)
Dept: ADMINISTRATIVE | Facility: OTHER | Age: 70
End: 2025-03-19

## 2025-03-19 NOTE — TELEPHONE ENCOUNTER
03/19/25 11:08 AM     Chart reviewed for Hemoglobin A1c was/were not submitted to the patient's insurance.     Crystal Olivier MA   [facility]

## 2025-04-07 ENCOUNTER — VBI (OUTPATIENT)
Dept: ADMINISTRATIVE | Facility: OTHER | Age: 70
End: 2025-04-07

## 2025-04-07 NOTE — TELEPHONE ENCOUNTER
04/07/25 3:24 PM     Chart reviewed for Hemoglobin A1c was/were not submitted to the patient's insurance.     Yamile Xiong MA   [facility]

## 2025-05-28 ENCOUNTER — APPOINTMENT (OUTPATIENT)
Dept: LAB | Facility: CLINIC | Age: 70
End: 2025-05-28
Payer: COMMERCIAL

## 2025-05-28 DIAGNOSIS — K86.81 EXOCRINE PANCREATIC INSUFFICIENCY: ICD-10-CM

## 2025-05-28 DIAGNOSIS — E11.42 DIABETIC POLYNEUROPATHY ASSOCIATED WITH TYPE 2 DIABETES MELLITUS (HCC): ICD-10-CM

## 2025-05-28 DIAGNOSIS — R49.0 CHRONIC HOARSENESS: ICD-10-CM

## 2025-05-28 DIAGNOSIS — R19.7 DIARRHEA, UNSPECIFIED TYPE: ICD-10-CM

## 2025-05-28 DIAGNOSIS — Z79.4 TYPE 2 DIABETES MELLITUS WITH HYPERGLYCEMIA, WITH LONG-TERM CURRENT USE OF INSULIN (HCC): ICD-10-CM

## 2025-05-28 DIAGNOSIS — E55.9 VITAMIN D DEFICIENCY: ICD-10-CM

## 2025-05-28 DIAGNOSIS — E78.2 MIXED HYPERLIPIDEMIA: ICD-10-CM

## 2025-05-28 DIAGNOSIS — K82.8 GALLBLADDER SLUDGE: ICD-10-CM

## 2025-05-28 DIAGNOSIS — E11.65 TYPE 2 DIABETES MELLITUS WITH HYPERGLYCEMIA, WITH LONG-TERM CURRENT USE OF INSULIN (HCC): ICD-10-CM

## 2025-05-28 LAB
25(OH)D3 SERPL-MCNC: 32.1 NG/ML (ref 30–100)
ALBUMIN SERPL BCG-MCNC: 4.2 G/DL (ref 3.5–5)
ALP SERPL-CCNC: 61 U/L (ref 34–104)
ALT SERPL W P-5'-P-CCNC: 16 U/L (ref 7–52)
ANION GAP SERPL CALCULATED.3IONS-SCNC: 9 MMOL/L (ref 4–13)
AST SERPL W P-5'-P-CCNC: 18 U/L (ref 13–39)
BASOPHILS # BLD AUTO: 0.11 THOUSANDS/ÂΜL (ref 0–0.1)
BASOPHILS NFR BLD AUTO: 1 % (ref 0–1)
BILIRUB SERPL-MCNC: 0.89 MG/DL (ref 0.2–1)
BUN SERPL-MCNC: 14 MG/DL (ref 5–25)
CALCIUM SERPL-MCNC: 9.2 MG/DL (ref 8.4–10.2)
CHLORIDE SERPL-SCNC: 100 MMOL/L (ref 96–108)
CHOLEST SERPL-MCNC: 173 MG/DL (ref ?–200)
CO2 SERPL-SCNC: 28 MMOL/L (ref 21–32)
CREAT SERPL-MCNC: 0.8 MG/DL (ref 0.6–1.3)
CREAT UR-MCNC: 172.4 MG/DL
EOSINOPHIL # BLD AUTO: 0.58 THOUSAND/ÂΜL (ref 0–0.61)
EOSINOPHIL NFR BLD AUTO: 6 % (ref 0–6)
ERYTHROCYTE [DISTWIDTH] IN BLOOD BY AUTOMATED COUNT: 11.7 % (ref 11.6–15.1)
EST. AVERAGE GLUCOSE BLD GHB EST-MCNC: 217 MG/DL
GFR SERPL CREATININE-BSD FRML MDRD: 92 ML/MIN/1.73SQ M
GLUCOSE P FAST SERPL-MCNC: 226 MG/DL (ref 65–99)
HBA1C MFR BLD: 9.2 %
HCT VFR BLD AUTO: 48.8 % (ref 36.5–49.3)
HDLC SERPL-MCNC: 42 MG/DL
HGB BLD-MCNC: 16.8 G/DL (ref 12–17)
IMM GRANULOCYTES # BLD AUTO: 0.04 THOUSAND/UL (ref 0–0.2)
IMM GRANULOCYTES NFR BLD AUTO: 0 % (ref 0–2)
LDLC SERPL CALC-MCNC: 82 MG/DL (ref 0–100)
LYMPHOCYTES # BLD AUTO: 3.2 THOUSANDS/ÂΜL (ref 0.6–4.47)
LYMPHOCYTES NFR BLD AUTO: 31 % (ref 14–44)
MCH RBC QN AUTO: 29.8 PG (ref 26.8–34.3)
MCHC RBC AUTO-ENTMCNC: 34.4 G/DL (ref 31.4–37.4)
MCV RBC AUTO: 87 FL (ref 82–98)
MICROALBUMIN UR-MCNC: 25.1 MG/L
MICROALBUMIN/CREAT 24H UR: 15 MG/G CREATININE (ref 0–30)
MONOCYTES # BLD AUTO: 0.82 THOUSAND/ÂΜL (ref 0.17–1.22)
MONOCYTES NFR BLD AUTO: 8 % (ref 4–12)
NEUTROPHILS # BLD AUTO: 5.75 THOUSANDS/ÂΜL (ref 1.85–7.62)
NEUTS SEG NFR BLD AUTO: 54 % (ref 43–75)
NRBC BLD AUTO-RTO: 0 /100 WBCS
PLATELET # BLD AUTO: 322 THOUSANDS/UL (ref 149–390)
PMV BLD AUTO: 9.9 FL (ref 8.9–12.7)
POTASSIUM SERPL-SCNC: 4.5 MMOL/L (ref 3.5–5.3)
PROT SERPL-MCNC: 7.4 G/DL (ref 6.4–8.4)
RBC # BLD AUTO: 5.63 MILLION/UL (ref 3.88–5.62)
SODIUM SERPL-SCNC: 137 MMOL/L (ref 135–147)
TRIGL SERPL-MCNC: 243 MG/DL (ref ?–150)
TSH SERPL DL<=0.05 MIU/L-ACNC: 0.74 UIU/ML (ref 0.45–4.5)
WBC # BLD AUTO: 10.5 THOUSAND/UL (ref 4.31–10.16)

## 2025-05-28 PROCEDURE — 83036 HEMOGLOBIN GLYCOSYLATED A1C: CPT

## 2025-05-28 PROCEDURE — 36415 COLL VENOUS BLD VENIPUNCTURE: CPT

## 2025-05-28 PROCEDURE — 80061 LIPID PANEL: CPT

## 2025-05-28 PROCEDURE — 80053 COMPREHEN METABOLIC PANEL: CPT

## 2025-05-28 PROCEDURE — 85025 COMPLETE CBC W/AUTO DIFF WBC: CPT

## 2025-05-28 PROCEDURE — 82306 VITAMIN D 25 HYDROXY: CPT

## 2025-05-28 PROCEDURE — 84443 ASSAY THYROID STIM HORMONE: CPT

## 2025-05-30 ENCOUNTER — RESULTS FOLLOW-UP (OUTPATIENT)
Dept: INTERNAL MEDICINE CLINIC | Facility: CLINIC | Age: 70
End: 2025-05-30

## 2025-06-03 ENCOUNTER — OFFICE VISIT (OUTPATIENT)
Dept: INTERNAL MEDICINE CLINIC | Facility: CLINIC | Age: 70
End: 2025-06-03
Payer: COMMERCIAL

## 2025-06-03 VITALS
SYSTOLIC BLOOD PRESSURE: 122 MMHG | BODY MASS INDEX: 27.26 KG/M2 | OXYGEN SATURATION: 96 % | HEART RATE: 98 BPM | TEMPERATURE: 98.1 F | DIASTOLIC BLOOD PRESSURE: 78 MMHG | WEIGHT: 179.9 LBS | HEIGHT: 68 IN

## 2025-06-03 DIAGNOSIS — E55.9 VITAMIN D DEFICIENCY: ICD-10-CM

## 2025-06-03 DIAGNOSIS — E11.42 DIABETIC POLYNEUROPATHY ASSOCIATED WITH TYPE 2 DIABETES MELLITUS (HCC): ICD-10-CM

## 2025-06-03 DIAGNOSIS — Z79.4 TYPE 2 DIABETES MELLITUS WITH HYPERGLYCEMIA, WITH LONG-TERM CURRENT USE OF INSULIN (HCC): Primary | ICD-10-CM

## 2025-06-03 DIAGNOSIS — K86.81 EXOCRINE PANCREATIC INSUFFICIENCY: ICD-10-CM

## 2025-06-03 DIAGNOSIS — Z12.5 SCREENING FOR PROSTATE CANCER: ICD-10-CM

## 2025-06-03 DIAGNOSIS — F17.211 NICOTINE DEPENDENCE, CIGARETTES, IN REMISSION: ICD-10-CM

## 2025-06-03 DIAGNOSIS — E11.65 TYPE 2 DIABETES MELLITUS WITH HYPERGLYCEMIA, WITH LONG-TERM CURRENT USE OF INSULIN (HCC): Primary | ICD-10-CM

## 2025-06-03 DIAGNOSIS — E13.69 OTHER SPECIFIED DIABETES MELLITUS WITH OTHER SPECIFIED COMPLICATION, UNSPECIFIED WHETHER LONG TERM INSULIN USE (HCC): ICD-10-CM

## 2025-06-03 DIAGNOSIS — E11.65 TYPE 2 DIABETES MELLITUS WITH HYPERGLYCEMIA, UNSPECIFIED WHETHER LONG TERM INSULIN USE (HCC): ICD-10-CM

## 2025-06-03 DIAGNOSIS — E78.2 MIXED HYPERLIPIDEMIA: ICD-10-CM

## 2025-06-03 PROCEDURE — G2211 COMPLEX E/M VISIT ADD ON: HCPCS | Performed by: FAMILY MEDICINE

## 2025-06-03 PROCEDURE — G0439 PPPS, SUBSEQ VISIT: HCPCS | Performed by: FAMILY MEDICINE

## 2025-06-03 PROCEDURE — 99214 OFFICE O/P EST MOD 30 MIN: CPT | Performed by: FAMILY MEDICINE

## 2025-06-03 RX ORDER — ERGOCALCIFEROL 1.25 MG/1
50000 CAPSULE, LIQUID FILLED ORAL
Qty: 6 CAPSULE | Refills: 3 | Status: SHIPPED | OUTPATIENT
Start: 2025-06-03

## 2025-06-03 RX ORDER — INSULIN GLARGINE 100 [IU]/ML
50 INJECTION, SOLUTION SUBCUTANEOUS DAILY
Qty: 45 ML | Refills: 1 | Status: SHIPPED | OUTPATIENT
Start: 2025-06-03 | End: 2025-11-30

## 2025-06-03 RX ORDER — LISINOPRIL 2.5 MG/1
2.5 TABLET ORAL DAILY
Qty: 100 TABLET | Refills: 1 | Status: SHIPPED | OUTPATIENT
Start: 2025-06-03

## 2025-06-03 RX ORDER — PRAVASTATIN SODIUM 20 MG
20 TABLET ORAL DAILY
Qty: 100 TABLET | Refills: 1 | Status: SHIPPED | OUTPATIENT
Start: 2025-06-03

## 2025-06-03 RX ORDER — GLIMEPIRIDE 4 MG/1
4 TABLET ORAL 2 TIMES DAILY
Qty: 200 TABLET | Refills: 1 | Status: SHIPPED | OUTPATIENT
Start: 2025-06-03

## 2025-06-03 NOTE — PROGRESS NOTES
Name: Toro Adames Jr.      : 1955      MRN: [id number]  Encounter Provider: Alissa Chowdhury MD  Encounter Date: 6/3/2025   Encounter department: [facility]  :  Assessment & Plan  Nicotine dependence, cigarettes, in remission    Orders:  •  CT lung screening program; Future    Type 2 diabetes mellitus with hyperglycemia, with long-term current use of insulin (HCC)  Hemoglobin A1c remains significantly above goal.  He feels much of this is related to his diet.  Will titrate the dose of the Basaglar to 50 units daily.  Will likely need more than that.  Are limited with other medications because of cost as well as his history of the pancreatitis.  Continue with the metformin.  Continue with the Amaryl.  Watch carbohydrate intake more closely.  Continue to follow-up with ophthalmology regularly.  Check feet daily.  Foot exam completed today.  Lab Results   Component Value Date    HGBA1C 9.2 (H) 2025       Orders:  •  glimepiride (AMARYL) 4 mg tablet; Take 1 tablet (4 mg total) by mouth 2 (two) times a day  •  Insulin Glargine Solostar (Basaglar KwikPen) 100 UNIT/ML SOPN; Inject 0.5 mL (50 Units total) under the skin in the morning  •  CBC and differential; Future  •  Comprehensive metabolic panel; Future  •  Hemoglobin A1C; Future    Mixed hyperlipidemia  Continue with the pravastatin.  Cholesterol is improved somewhat.  Triglycerides remain elevated.  Watch diet more closely.  Increase fiber in diet.  Orders:  •  pravastatin (PRAVACHOL) 20 mg tablet; Take 1 tablet (20 mg total) by mouth daily  •  CBC and differential; Future  •  Comprehensive metabolic panel; Future  •  Lipid panel; Future  •  TSH, 3rd generation; Future    Exocrine pancreatic insufficiency  Loose bowel movements have improved.  Watch diet triggers.  Watch fatty food intake.  Orders:  •  CBC and differential; Future    Diabetic polyneuropathy associated with type 2 diabetes mellitus (HCC)  Check feet  daily.  Watch for any open areas especially with the significant neuropathy.  Consider following up with podiatry.  Lab Results   Component Value Date    HGBA1C 9.2 (H) 05/28/2025       Orders:  •  CBC and differential; Future  •  Hemoglobin A1C; Future    Vitamin D deficiency  Continue with vitamin D supplementation.  Will continue to follow vitamin D level with routine laboratory testing and adjust dosing if needed.  Orders:  •  ergocalciferol (VITAMIN D2) 50,000 units; Take 1 capsule (50,000 Units total) by mouth every 14 (fourteen) days  •  CBC and differential; Future  •  Vitamin D 25 hydroxy; Future    Other specified diabetes mellitus with other specified complication, unspecified whether long term insulin use (HCC)    Lab Results   Component Value Date    HGBA1C 9.2 (H) 05/28/2025       Orders:  •  Insulin Pen Needle 32G X 4 MM MISC; Use 3 (three) times a day  •  CBC and differential; Future    Type 2 diabetes mellitus with hyperglycemia, unspecified whether long term insulin use (HCC)  See above.  Lab Results   Component Value Date    HGBA1C 9.2 (H) 05/28/2025       Orders:  •  lisinopril (ZESTRIL) 2.5 mg tablet; Take 1 tablet (2.5 mg total) by mouth daily  •  metFORMIN (GLUCOPHAGE) 850 mg tablet; Take 1 tablet (850 mg total) by mouth 2 (two) times a day with meals  •  CBC and differential; Future  •  Comprehensive metabolic panel; Future  •  Hemoglobin A1C; Future    Screening for prostate cancer    Orders:  •  PSA, Total Screen; Future      Depression Screening and Follow-up Plan: Patient was screened for depression during today's encounter. They screened negative with a PHQ-2 score of 0.      Falls Plan of Care: balance, strength, and gait training instructions were provided.     Lung Cancer Screening Shared Decision Making: I discussed with him that he is a candidate for lung cancer CT screening.     The following Shared Decision-Making points were covered:  Benefits of screening were discussed,  including the rates of reduction in death from lung cancer and other causes.  Harms of screening were reviewed, including false positive tests, radiation exposure levels, risks of invasive procedures, risks of complications of screening, and risk of overdiagnosis.  I counseled on the importance of adherence to annual lung cancer LDCT screening, impact of co-morbidities, and ability or willingness to undergo diagnosis and treatment.  I counseled on the importance of maintaining abstinence as a former smoker or was counseled on the importance of smoking cessation if a current smoker    Review of Eligibility Criteria: He meets all of the criteria for Lung Cancer Screening.   - He is 69 y.o.   - He has 20 pack year tobacco history and is a current smoker or has quit within the past 15 years  - He presents no signs or symptoms of lung cancer    After discussion, the patient decided to elect lung cancer screening.      Preventive health issues were discussed with patient, and age appropriate screening tests were ordered as noted in patient's After Visit Summary. Personalized health advice and appropriate referrals for health education or preventive services given if needed, as noted in patient's After Visit Summary.    History of Present Illness     He presents for routine follow-up as well as Medicare wellness visit.  Has generally been feeling well.  He admits that he has been eating very poorly over the last few months.  Has been eating a lot of sweets.  He expected his hemoglobin A1c to be significantly elevated.  Denies any significant polyuria, polydipsia, or polyphasia.  Ophthalmology had been following closely for some changes behind the left eye but that has stabilized.  Has some chronic numbness into his hands and feet with some pain at times.  Denies any hypoglycemic episodes.  Continues with the glimepiride, metformin, and Basaglar.  Has been taking these regularly.  Tolerating the pravastatin well.  Denies  any significant muscle aches or weakness with this.  Appetite has been good.  Continues with vitamin D supplementation.  Tries to stay well-hydrated.       Patient Care Team:  Alissa Chowdhury MD as PCP - General (Family Medicine)  MD Ike Griffin MD (General Surgery)    Review of Systems   Constitutional:  Negative for activity change, appetite change, chills and fever.   HENT:  Negative for hearing loss.    Eyes:  Negative for pain and visual disturbance.   Respiratory:  Negative for chest tightness and shortness of breath.    Cardiovascular:  Negative for chest pain and palpitations.   Gastrointestinal:  Negative for abdominal pain, blood in stool, diarrhea, nausea and vomiting.   Endocrine: Negative for polydipsia, polyphagia and polyuria.   Genitourinary:  Positive for frequency. Negative for dysuria.   Musculoskeletal:  Positive for arthralgias. Negative for gait problem.   Skin:  Negative for color change.   Neurological:  Negative for dizziness and headaches.   Hematological:  Does not bruise/bleed easily.   Psychiatric/Behavioral:  Negative for behavioral problems. The patient is not nervous/anxious.      Medical History Reviewed by provider this encounter:       Annual Wellness Visit Questionnaire   Toro is here for his Subsequent Wellness visit. Last Medicare Wellness visit information reviewed, patient interviewed and updates made to the record today.      Health Risk Assessment:   Patient rates overall health as good. Patient feels that their physical health rating is same. Patient is satisfied with their life. Eyesight was rated as same. Hearing was rated as same. Patient feels that their emotional and mental health rating is same. Patients states they are sometimes angry. Patient states they are sometimes unusually tired/fatigued. Pain experienced in the last 7 days has been some. Patient's pain rating has been 3/10. Patient states that he has experienced no weight loss or  gain in last 6 months.     Depression Screening:   PHQ-2 Score: 0      Fall Risk Screening:   In the past year, patient has experienced: history of falling in past year    Number of falls: 2 or more  Injured during fall?: No    Feels unsteady when standing or walking?: No    Worried about falling?: No      Home Safety:  Patient does not have trouble with stairs inside or outside of their home. Patient has working smoke alarms and has working carbon monoxide detector. Home safety hazards include: none.     Nutrition:   Current diet is Regular.     Medications:   Patient is currently taking over-the-counter supplements. OTC medications include: see medication list. Patient is able to manage medications.     Activities of Daily Living (ADLs)/Instrumental Activities of Daily Living (IADLs):   Walk and transfer into and out of bed and chair?: Yes  Dress and groom yourself?: Yes    Bathe or shower yourself?: Yes    Feed yourself? Yes  Do your laundry/housekeeping?: Yes  Manage your money, pay your bills and track your expenses?: Yes  Make your own meals?: Yes    Do your own shopping?: Yes    Previous Hospitalizations:   Any hospitalizations or ED visits within the last 12 months?: No      Advance Care Planning:   Living will: Yes    Durable POA for healthcare: Yes    Advanced directive: Yes    Provider agrees with end of life decisions: Yes      Cognitive Screening:   Provider or family/friend/caregiver concerned regarding cognition?: No    Preventive Screenings      Cardiovascular Screening:    General: Screening Not Indicated and History Lipid Disorder      Diabetes Screening:     General: Screening Not Indicated and History Diabetes      Colorectal Cancer Screening:     General: Screening Current      Prostate Cancer Screening:    General: Risks and Benefits Discussed    Due for: PSA      Osteoporosis Screening:    General: Screening Not Indicated      Abdominal Aortic Aneurysm (AAA) Screening:    Risk factors include:  age between 65-74 yo and tobacco use        General: Patient Declines      Lung Cancer Screening:     General: Risks and Benefits Discussed    Due for: Low Dose CT (LDCT)      Hepatitis C Screening:    General: Screening Current    Immunizations:  - Immunizations due: Zoster (Shingrix)  - Risks/benefits immunizations discussed      Screening, Brief Intervention, and Referral to Treatment (SBIRT)     Screening  Typical number of drinks in a day: 0  Typical number of drinks in a week: 0  Interpretation: Low risk drinking behavior.    Single Item Drug Screening:  How often have you used an illegal drug (including marijuana) or a prescription medication for non-medical reasons in the past year? never    Single Item Drug Screen Score: 0  Interpretation: Negative screen for possible drug use disorder    Brief Intervention  Alcohol & drug use screenings were reviewed. No concerns regarding substance use disorder identified.     Social Drivers of Health     Financial Resource Strain: Low Risk  (1/5/2023)    Overall Financial Resource Strain (CARDIA)    • Difficulty of Paying Living Expenses: Not hard at all   Food Insecurity: No Food Insecurity (6/3/2025)    Nursing - Inadequate Food Risk Classification    • Worried About Running Out of Food in the Last Year: Never true    • Ran Out of Food in the Last Year: Never true   Transportation Needs: No Transportation Needs (6/3/2025)    PRAPARE - Transportation    • Lack of Transportation (Medical): No    • Lack of Transportation (Non-Medical): No   Housing Stability: Low Risk  (6/3/2025)    Housing Stability Vital Sign    • Unable to Pay for Housing in the Last Year: No    • Number of Times Moved in the Last Year: 1    • Homeless in the Last Year: No   Utilities: Not At Risk (6/3/2025)    [facility] Utilities    • Threatened with loss of utilities: No     No results found.    Objective   /78 (BP Location: Left arm, Patient Position: Sitting, Cuff Size: Standard)   Pulse 98    "Temp 98.1 °F (36.7 °C)   Ht 5' 8\" (1.727 m)   Wt 81.6 kg (179 lb 14.4 oz)   SpO2 96%   BMI 27.35 kg/m²     Physical Exam  Vitals and nursing note reviewed.   Constitutional:       General: He is not in acute distress.     Appearance: He is well-developed and well-groomed.   HENT:      Head: Normocephalic and atraumatic.      Nose: Nose normal.     Eyes:      Conjunctiva/sclera: Conjunctivae normal.      Pupils: Pupils are equal, round, and reactive to light.       Cardiovascular:      Rate and Rhythm: Normal rate and regular rhythm.      Pulses: Pulses are weak.           Dorsalis pedis pulses are 1+ on the right side and 1+ on the left side.        Posterior tibial pulses are 1+ on the right side and 1+ on the left side.      Heart sounds: Normal heart sounds. No murmur heard.     No friction rub. No gallop.   Pulmonary:      Breath sounds: Decreased breath sounds present. No wheezing or rales.   Chest:      Chest wall: No tenderness.   Abdominal:      General: There is no distension.      Tenderness: There is no abdominal tenderness. There is no guarding or rebound.   Feet:      Right foot:      Skin integrity: Dry skin present. No ulcer, skin breakdown, erythema, warmth or callus.      Left foot:      Skin integrity: Dry skin present. No ulcer, skin breakdown, erythema, warmth or callus.   Lymphadenopathy:      Cervical: No cervical adenopathy.     Skin:     General: Skin is warm and dry.     Neurological:      Mental Status: He is alert and oriented to person, place, and time.     Psychiatric:         Mood and Affect: Mood and affect normal.         Speech: Speech normal.         Behavior: Behavior normal. Behavior is cooperative.         Cognition and Memory: Cognition and memory normal.     Diabetic Foot Exam    Patient's shoes and socks removed.    Right Foot/Ankle   Right Foot Inspection  Skin Exam: skin normal, skin intact and dry skin. No warmth, no callus, no erythema, no maceration, no abnormal " color, no pre-ulcer, no ulcer and no callus.     Toe Exam: ROM and strength within normal limits.     Sensory   Monofilament testing: absent    Vascular  Capillary refills: < 3 seconds  The right DP pulse is 1+. The right PT pulse is 1+.     Left Foot/Ankle  Left Foot Inspection  Skin Exam: skin normal, skin intact and dry skin. No warmth, no erythema, no maceration, normal color, no pre-ulcer, no ulcer and no callus.     Toe Exam: ROM and strength within normal limits.     Sensory   Monofilament testing: absent    Vascular  Capillary refills: < 3 seconds  The left DP pulse is 1+. The left PT pulse is 1+.     Assign Risk Category  No deformity present  Loss of protective sensation  Weak pulses  Risk: 2

## 2025-06-04 NOTE — ASSESSMENT & PLAN NOTE
Check feet daily.  Watch for any open areas especially with the significant neuropathy.  Consider following up with podiatry.  Lab Results   Component Value Date    HGBA1C 9.2 (H) 05/28/2025       Orders:  •  CBC and differential; Future  •  Hemoglobin A1C; Future

## 2025-06-04 NOTE — ASSESSMENT & PLAN NOTE
Continue with vitamin D supplementation.  Will continue to follow vitamin D level with routine laboratory testing and adjust dosing if needed.  Orders:  •  ergocalciferol (VITAMIN D2) 50,000 units; Take 1 capsule (50,000 Units total) by mouth every 14 (fourteen) days  •  CBC and differential; Future  •  Vitamin D 25 hydroxy; Future

## 2025-06-04 NOTE — ASSESSMENT & PLAN NOTE
Continue with the pravastatin.  Cholesterol is improved somewhat.  Triglycerides remain elevated.  Watch diet more closely.  Increase fiber in diet.  Orders:  •  pravastatin (PRAVACHOL) 20 mg tablet; Take 1 tablet (20 mg total) by mouth daily  •  CBC and differential; Future  •  Comprehensive metabolic panel; Future  •  Lipid panel; Future  •  TSH, 3rd generation; Future

## 2025-06-04 NOTE — ASSESSMENT & PLAN NOTE
Loose bowel movements have improved.  Watch diet triggers.  Watch fatty food intake.  Orders:  •  CBC and differential; Future

## 2025-06-04 NOTE — ASSESSMENT & PLAN NOTE
Hemoglobin A1c remains significantly above goal.  He feels much of this is related to his diet.  Will titrate the dose of the Basaglar to 50 units daily.  Will likely need more than that.  Are limited with other medications because of cost as well as his history of the pancreatitis.  Continue with the metformin.  Continue with the Amaryl.  Watch carbohydrate intake more closely.  Continue to follow-up with ophthalmology regularly.  Check feet daily.  Foot exam completed today.  Lab Results   Component Value Date    HGBA1C 9.2 (H) 05/28/2025       Orders:  •  glimepiride (AMARYL) 4 mg tablet; Take 1 tablet (4 mg total) by mouth 2 (two) times a day  •  Insulin Glargine Solostar (Basaglar KwikPen) 100 UNIT/ML SOPN; Inject 0.5 mL (50 Units total) under the skin in the morning  •  CBC and differential; Future  •  Comprehensive metabolic panel; Future  •  Hemoglobin A1C; Future